# Patient Record
Sex: FEMALE | Race: BLACK OR AFRICAN AMERICAN | NOT HISPANIC OR LATINO | Employment: UNEMPLOYED | URBAN - METROPOLITAN AREA
[De-identification: names, ages, dates, MRNs, and addresses within clinical notes are randomized per-mention and may not be internally consistent; named-entity substitution may affect disease eponyms.]

---

## 2017-05-16 ENCOUNTER — HOSPITAL ENCOUNTER (EMERGENCY)
Facility: CLINIC | Age: 22
Discharge: HOME | End: 2017-05-16
Attending: EMERGENCY MEDICINE | Admitting: EMERGENCY MEDICINE
Payer: COMMERCIAL

## 2017-05-16 VITALS
HEART RATE: 86 BPM | OXYGEN SATURATION: 99 % | SYSTOLIC BLOOD PRESSURE: 145 MMHG | RESPIRATION RATE: 12 BRPM | DIASTOLIC BLOOD PRESSURE: 87 MMHG | TEMPERATURE: 99 F

## 2017-05-16 DIAGNOSIS — N92.6 MISSED PERIOD: ICD-10-CM

## 2017-05-16 LAB
ALBUMIN UR-MCNC: NEGATIVE MG/DL
APPEARANCE UR: ABNORMAL
BILIRUB UR QL STRIP: NEGATIVE
COLOR UR AUTO: YELLOW
GLUCOSE UR STRIP-MCNC: NEGATIVE MG/DL
HCG UR QL: NEGATIVE
HGB UR QL STRIP: NEGATIVE
HYALINE CASTS #/AREA URNS LPF: 1 /LPF (ref 0–2)
KETONES UR STRIP-MCNC: NEGATIVE MG/DL
LEUKOCYTE ESTERASE UR QL STRIP: ABNORMAL
MUCOUS THREADS #/AREA URNS LPF: PRESENT /LPF
NITRATE UR QL: NEGATIVE
PH UR STRIP: 6 PH (ref 5–7)
RBC #/AREA URNS AUTO: 2 /HPF (ref 0–2)
SP GR UR STRIP: 1.03 (ref 1–1.03)
SQUAMOUS #/AREA URNS AUTO: 20 /HPF (ref 0–1)
URN SPEC COLLECT METH UR: ABNORMAL
UROBILINOGEN UR STRIP-MCNC: 0 MG/DL (ref 0–2)
WBC #/AREA URNS AUTO: 8 /HPF (ref 0–2)

## 2017-05-16 PROCEDURE — 81001 URINALYSIS AUTO W/SCOPE: CPT | Performed by: EMERGENCY MEDICINE

## 2017-05-16 PROCEDURE — 99283 EMERGENCY DEPT VISIT LOW MDM: CPT

## 2017-05-16 PROCEDURE — 87086 URINE CULTURE/COLONY COUNT: CPT | Performed by: EMERGENCY MEDICINE

## 2017-05-16 PROCEDURE — 81025 URINE PREGNANCY TEST: CPT | Performed by: EMERGENCY MEDICINE

## 2017-05-16 ASSESSMENT — ENCOUNTER SYMPTOMS
FREQUENCY: 0
FEVER: 0
CHILLS: 0
DYSURIA: 0
ABDOMINAL PAIN: 0
FLANK PAIN: 0

## 2017-05-16 NOTE — DISCHARGE INSTRUCTIONS
Amenorrhea     Normally, the uterine lining builds up and is shed each month during a woman s period. With amenorrhea, this process does not happen.   Menstruation occurs when a woman sheds the lining of her uterus (womb). It is also called a  period.  For most women, this happens once a month. But some women may not get their periods. This is called amenorrhea.  Amenorrhea may be due to a number of causes. These include:    Pregnancy, breastfeeding, or menopause    Hormone problems    Emotional stress    Being at too low body weight    Exercising too much    Poor nutrition or eating disorders    Taking certain medicines    Having certain birth defects or genetic disorders  There are 2 types of amenorrhea:    Primary amenorrhea is when a girl has not had her first period by age 15.    Secondary amenorrhea is when:    A girl or woman who has been having normal periods stops getting them for 3 months in a row    A girl or woman who has been having irregular periods stops getting them for 6 months in a row  One or more tests can be done to find out why you re not having periods. These include blood tests and imaging tests. Once the cause is found, it can be treated. Treatments can range from lifestyle and diet changes to medicines, procedures, or surgery. Your healthcare provider will discuss options with you as needed.  Follow-up care  Follow up with your healthcare provider as advised. You'll be told the results of any tests as soon as they are ready.   Note: Know that you can still become pregnant even if you are not having regular periods. It is important to use some form of birth control if you are sexually active and do not wish to become pregnant.   When to seek medical advice  Call your healthcare provider right way if any of these occur:    Severe pain in the abdomen (belly)    Swelling of the belly    Sudden, severe vaginal bleeding    Feeling faint or passing out    0638-2830 [employer] [address]  [place] [address]. All rights reserved. This information is not intended as a substitute for professional medical care. Always follow your healthcare professional's instructions.

## 2017-05-16 NOTE — ED AVS SNAPSHOT
[facility] Emergency Department    [address]    [facility] [address]    Phone:  [phone]    Fax:  [phone]                                       Anna Qiu   MRN: [id number]    Department:  [facility] Emergency Department   Date of Visit:  5/16/2017           After Visit Summary Signature Page     I have received my discharge instructions, and my questions have been answered. I have discussed any challenges I see with this plan with the nurse or doctor.    ..........................................................................................................................................  Patient/Patient Representative Signature      ..........................................................................................................................................  Patient Representative Print Name and Relationship to Patient    ..................................................               ................................................  Date                                            Time    ..........................................................................................................................................  Reviewed by Signature/Title    ...................................................              ..............................................  Date                                                            Time

## 2017-05-16 NOTE — ED PROVIDER NOTES
History     Chief Complaint:  Pregnancy Test      The history is provided by the patient.      Anna Qiu is a 21 year old female who presents with concerns for pregnancy. Her last known period was 4/10 and she estimates she is five days late. She is sexually active and not taking birth control. She denies any issues with fever or urinary symptoms.    Allergies:  No Known Drug Allergies      Medications:    The patient is not currently taking any prescribed medications.     Past Medical History:    The patient denies any relevant past medical history.     Past Surgical History:    History reviewed. No pertinent past surgical history.     Family History:    The patient denies any relevant family medical history.     Social History:  The patient presented to the ED alone.   Smoking Status: Never smoker  Smokeless Tobacco: No  Alcohol Use: No   Marital Status:  Single [1]     Review of Systems   Constitutional: Negative for chills and fever.   Gastrointestinal: Negative for abdominal pain.   Genitourinary: Negative for dysuria, flank pain, frequency, vaginal bleeding and vaginal discharge.   All other systems reviewed and are negative.    Physical Exam   Vitals:  Patient Vitals for the past 24 hrs:   BP Temp Pulse Heart Rate Resp SpO2   05/16/17 1241 145/87 99  F (37.2  C) 86 86 12 99 %     Physical Exam  Constitutional: Patient appears well-developed and well-nourished. Patient is in no acute distress  HENT:    Head: No external signs of trauma noted.   Eyes: Conjunctivae are normal. Pupils are equal, round, and reactive to light.   Cardiovascular:    Normal rate, regular rhythm and normal heart sounds.     Exam reveals no friction rub.     No murmur heard.  Pulmonary/Chest:    Effort normal and breath sounds normal.    No respiratory distress.    There are no wheezes.    There are no rales.   Abdominal:    Soft.    Bowel sounds normal.    There is no distension.    There is no tenderness.    There is no  rebound or guarding.   Musculoskeletal:    Normal range of motion.    Normal Tone  Neurological: Patient is alert and oriented to person, place, and time.   Skin: Skin is warm and dry. Patient is not diaphoretic.    Emergency Department Course     Laboratory:  Laboratory findings were communicated with the patient who voiced understanding of the findings.  UA: Leukocyte Esterase: Trace (A), WBC/HPF: 8 (H), Squamous Epithelial: 20 (H), Mucous: Present (A) o/w WNL  HCG Qualitative: Negative    Emergency Department Course:  Nursing notes and vitals reviewed.  I performed an exam of the patient as documented above.   The patient provided a urine sample here in the emergency department. This was sent for laboratory testing, findings above.   The patient was updated on the results of their lab tests.      I discussed the treatment plan with the patient. They expressed understanding of this plan and consented to discharge. They will be discharged home with instructions for care and follow up. In addition, the patient will return to the emergency department if their symptoms persist, worsen, if new symptoms arise or if there is any concern.  All questions were answered.    I personally reviewed the laboratory results with the Patient and answered all related questions prior to discharge.    Impression & Plan      Medical Decision Making:  Anna Qiu is a 21 year old female who presents to the emergency department today for evaluation of pregnancy status. Patient states she has been sexually active and is about five days late in her period. The patient's UA is unremarkable and she is not pregnant. She has no other symptoms. At this time we will discharge her home and follow up with PCP. Anticipatory guidance given prior to discharge.     Diagnosis:    ICD-10-CM    1. Missed period N92.6       Disposition:   Discharge    Scribe Disclosure:  Luigi VAZQUEZ, am serving as a scribe at 1:20 PM on 5/16/2017 to document  services personally performed by Abdifatah Sawyer DO, based on my observations and the provider's statements to me.   5/16/2017   [facility] EMERGENCY DEPARTMENT       Abdifatah Sawyer DO  05/16/17 4969

## 2017-05-16 NOTE — ED AVS SNAPSHOT
[facility] Emergency Department    [address]    [facility] [address]    Phone:  [phone]    Fax:  [phone]                                       Anna Qiu   MRN: [id number]    Department:  [facility] Emergency Department   Date of Visit:  5/16/2017           Patient Information     Date Of Birth          1995        Your diagnoses for this visit were:     Missed period        You were seen by Abdifatah Sawyer DO.      Follow-up Information     Follow up with [facility]. Call in 2 days.    Why:  As needed    Contact information:    [address] [facility] [address]  [phone]          Follow up with [facility] Emergency Department.    Specialty:  EMERGENCY MEDICINE    Why:  If symptoms worsen    Contact information:    [address]  [facility] [address]  [phone]        Discharge Instructions         Amenorrhea     Normally, the uterine lining builds up and is shed each month during a woman s period. With amenorrhea, this process does not happen.   Menstruation occurs when a woman sheds the lining of her uterus (womb). It is also called a  period.  For most women, this happens once a month. But some women may not get their periods. This is called amenorrhea.  Amenorrhea may be due to a number of causes. These include:    Pregnancy, breastfeeding, or menopause    Hormone problems    Emotional stress    Being at too low body weight    Exercising too much    Poor nutrition or eating disorders    Taking certain medicines    Having certain birth defects or genetic disorders  There are 2 types of amenorrhea:    Primary amenorrhea is when a girl has not had her first period by age 15.    Secondary amenorrhea is when:    A girl or woman who has been having normal periods stops getting them for 3 months in a row    A girl or woman who has been having irregular periods stops getting them  for 6 months in a row  One or more tests can be done to find out why you re not having periods. These include blood tests and imaging tests. Once the cause is found, it can be treated. Treatments can range from lifestyle and diet changes to medicines, procedures, or surgery. Your healthcare provider will discuss options with you as needed.  Follow-up care  Follow up with your healthcare provider as advised. You'll be told the results of any tests as soon as they are ready.   Note: Know that you can still become pregnant even if you are not having regular periods. It is important to use some form of birth control if you are sexually active and do not wish to become pregnant.   When to seek medical advice  Call your healthcare provider right way if any of these occur:    Severe pain in the abdomen (belly)    Swelling of the belly    Sudden, severe vaginal bleeding    Feeling faint or passing out    7691-5666 [employer]. [address] [place] [address]. All rights reserved. This information is not intended as a substitute for professional medical care. Always follow your healthcare professional's instructions.          24 Hour Appointment Hotline       To make an appointment at any [facility], call [phone] ([phone]). If you don't have a family doctor or clinic, we will help you find one. [facility] are conveniently located to serve the needs of you and your family.             Review of your medicines      Our records show that you are taking the medicines listed below. If these are incorrect, please call your family doctor or clinic.        Dose / Directions Last dose taken    acetaminophen-codeine 300-30 MG per tablet   Commonly known as:  TYLENOL/codeine #3   Dose:  1-2 tablet   Quantity:  20 tablet        Take 1-2 tablets by mouth every 6 hours as needed for pain.   Refills:  0        ibuprofen 200 MG tablet   Commonly known as:  ADVIL/MOTRIN   Dose:  600 mg    Quantity:  1 tablet        Take 3 tablets by mouth every 8 hours as needed for pain.   Refills:  0                Procedures and tests performed during your visit     HCG qualitative urine    UA with Microscopic    Urine Culture      Orders Needing Specimen Collection     None      Pending Results     Date and Time Order Name Status Description    5/16/2017 1239 Urine Culture In process             Pending Culture Results     Date and Time Order Name Status Description    5/16/2017 1239 Urine Culture In process             Pending Results Instructions     If you had any lab results that were not finalized at the time of your Discharge, you can call the ED Lab Result RN at [phone]. You will be contacted by this team for any positive Lab results or changes in treatment. The nurses are available 7 days a week from 10A to 6:30P.  You can leave a message 24 hours per day and they will return your call.        Test Results From Your Hospital Stay        5/16/2017  1:25 PM      Component Results     Component Value Ref Range & Units Status    Color Urine Yellow  Final    Appearance Urine Slightly Cloudy  Final    Glucose Urine Negative NEG mg/dL Final    Bilirubin Urine Negative NEG Final    Ketones Urine Negative NEG mg/dL Final    Specific Gravity Urine 1.029 1.003 - 1.035 Final    Blood Urine Negative NEG Final    pH Urine 6.0 5.0 - 7.0 pH Final    Protein Albumin Urine Negative NEG mg/dL Final    Urobilinogen mg/dL 0.0 0.0 - 2.0 mg/dL Final    Nitrite Urine Negative NEG Final    Leukocyte Esterase Urine Trace (A) NEG Final    Source Midstream Urine  Final    WBC Urine 8 (H) 0 - 2 /HPF Final    RBC Urine 2 0 - 2 /HPF Final    Squamous Epithelial /HPF Urine 20 (H) 0 - 1 /HPF Final    Mucous Urine Present (A) NEG /LPF Final    Hyaline Casts 1 0 - 2 /LPF Final         5/16/2017  1:12 PM         5/16/2017  1:24 PM      Component Results     Component Value Ref Range & Units Status    HCG Qual Urine Negative NEG Final                 Clinical Quality Measure: Blood Pressure Screening     Your blood pressure was checked while you were in the emergency department today. The last reading we obtained was  BP: 145/87 . Please read the guidelines below about what these numbers mean and what you should do about them.  If your systolic blood pressure (the top number) is less than 120 and your diastolic blood pressure (the bottom number) is less than 80, then your blood pressure is normal. There is nothing more that you need to do about it.  If your systolic blood pressure (the top number) is 120-139 or your diastolic blood pressure (the bottom number) is 80-89, your blood pressure may be higher than it should be. You should have your blood pressure rechecked within a year by a primary care provider.  If your systolic blood pressure (the top number) is 140 or greater or your diastolic blood pressure (the bottom number) is 90 or greater, you may have high blood pressure. High blood pressure is treatable, but if left untreated over time it can put you at risk for heart attack, stroke, or kidney failure. You should have your blood pressure rechecked by a primary care provider within the next 4 weeks.  If your provider in the emergency department today gave you specific instructions to follow-up with your doctor or provider even sooner than that, you should follow that instruction and not wait for up to 4 weeks for your follow-up visit.        Thank you for choosing [facility]       Thank you for choosing [facility] for your care. Our goal is always to provide you with excellent care. Hearing back from our patients is one way we can continue to improve our services. Please take a few minutes to complete the written survey that you may receive in the mail after you visit with us. Thank you!        [facility] Information     [facility] lets you send messages to your doctor, view your test results, renew your prescriptions, schedule appointments and more.  "To sign up, go to [email or web address] . Click on \"Log in\" on the left side of the screen, which will take you to the Welcome page. Then click on \"Sign up Now\" on the right side of the page.     You will be asked to enter the access code listed below, as well as some personal information. Please follow the directions to create your username and password.     Your access code is: [email or web address]  Expires: 2017  1:44 PM     Your access code will  in 90 days. If you need help or a new code, please call your [facility] clinic or [phone].        Care EveryWhere ID     This is your Care EveryWhere ID. This could be used by other organizations to access your [facility] medical records  [id number]        After Visit Summary       This is your record. Keep this with you and show to your community pharmacist(s) and doctor(s) at your next visit.                  "

## 2017-05-17 LAB
BACTERIA SPEC CULT: NORMAL
Lab: NORMAL
MICRO REPORT STATUS: NORMAL
SPECIMEN SOURCE: NORMAL

## 2017-10-19 ENCOUNTER — HOSPITAL ENCOUNTER (EMERGENCY)
Facility: CLINIC | Age: 22
Discharge: HOME | End: 2017-10-19
Attending: PHYSICIAN ASSISTANT | Admitting: PHYSICIAN ASSISTANT
Payer: COMMERCIAL

## 2017-10-19 VITALS
OXYGEN SATURATION: 100 % | DIASTOLIC BLOOD PRESSURE: 75 MMHG | HEART RATE: 82 BPM | RESPIRATION RATE: 16 BRPM | SYSTOLIC BLOOD PRESSURE: 132 MMHG | TEMPERATURE: 98.8 F

## 2017-10-19 DIAGNOSIS — R07.89 ATYPICAL CHEST PAIN: ICD-10-CM

## 2017-10-19 DIAGNOSIS — R07.0 THROAT PAIN: ICD-10-CM

## 2017-10-19 LAB
DEPRECATED S PYO AG THROAT QL EIA: NORMAL
SPECIMEN SOURCE: NORMAL

## 2017-10-19 PROCEDURE — 93005 ELECTROCARDIOGRAM TRACING: CPT

## 2017-10-19 PROCEDURE — 99284 EMERGENCY DEPT VISIT MOD MDM: CPT

## 2017-10-19 PROCEDURE — 87880 STREP A ASSAY W/OPTIC: CPT | Performed by: PHYSICIAN ASSISTANT

## 2017-10-19 PROCEDURE — 87081 CULTURE SCREEN ONLY: CPT | Performed by: PHYSICIAN ASSISTANT

## 2017-10-19 PROCEDURE — 25000132 ZZH RX MED GY IP 250 OP 250 PS 637: Performed by: PHYSICIAN ASSISTANT

## 2017-10-19 RX ORDER — IBUPROFEN 600 MG/1
600 TABLET, FILM COATED ORAL ONCE
Status: COMPLETED | OUTPATIENT
Start: 2017-10-19 | End: 2017-10-19

## 2017-10-19 RX ADMIN — IBUPROFEN 600 MG: 600 TABLET ORAL at 11:08

## 2017-10-19 RX ADMIN — Medication 10 MG: at 11:08

## 2017-10-19 ASSESSMENT — ENCOUNTER SYMPTOMS
SORE THROAT: 1
HEADACHES: 1
VOMITING: 0
APPETITE CHANGE: 1
NAUSEA: 1
FEVER: 0

## 2017-10-19 NOTE — ED NOTES
Swabbing patients throat when she began to complain of chest heaviness.  Patients states she has had heart problems 6-7 months ago but did not follow up. Respiratory rate is 16, effort non-labored. Informed PA of patient complaints.

## 2017-10-19 NOTE — ED AVS SNAPSHOT
[facility] Emergency Department    [address]    [facility] [address]    Phone:  [phone]    Fax:  [phone]                                       Anna Qiu   MRN: [id number]    Department:  [facility] Emergency Department   Date of Visit:  10/19/2017           Patient Information     Date Of Birth          1995        Your diagnoses for this visit were:     Throat pain     Atypical chest pain        You were seen by Trish Pillai PA-C.      Follow-up Information     Follow up with Jerrica Craig NP In 3 days.    Specialty:  Nurse Practitioner    Why:  As needed    Contact information:    [facility]  [address] [facility] [address] [phone]          Follow up with [facility] Emergency Department.    Specialty:  EMERGENCY MEDICINE    Why:  If symptoms worsen    Contact information:    [address]  [facility] [id number] [phone]        Discharge Instructions         Chest Wall Pain: Costochondritis    The chest pain that you have had today is caused by costochondritis. This condition is caused by an inflammation of the cartilage joining your ribs to your breastbone. It is not caused by heart or lung problems. Your healthcare team has made sure that the chest pain you feel is not from a life threatening cause of chest pain such as heart attack, collapsed lung, blood clot in the lung, tear in the aorta, or esophageal rupture. The inflammation may have been brought on by a blow to the chest, lifting heavy objects, intense exercise, or an illness that made you cough and sneeze a lot. It often occurs during times of emotional stress. It can be painful, but it is not dangerous. It usually goes away in 1 to 2 weeks. But it may happen again. Rarely, a more serious condition may cause symptoms similar to costochondritis. That s why it s important to watch for the warning  signs listed below.  Home care  Follow these guidelines when caring for yourself at home:    If you feel that emotional stress is a cause of your condition, try to figure out the sources of that stress. It may not be obvious. Learn ways to deal with the stress in your life. This can include regular exercise, muscle relaxation, meditation, or simply taking time out for yourself.    You may use acetaminophen, ibuprofen, or naproxen to control pain, unless another pain medicine was prescribed. If you have liver or kidney disease or ever had a stomach ulcer, talk with your healthcare provider before using these medicines.    You can also help ease pain by using a hot, wet compress or heating pad. Use this with or without a medicated skin cream that helps relieves pain.    Do stretching exercise as advised by your provider.    Take any prescribed medicines as directed.  Follow-up care  Follow up with your healthcare provider, or as advised, if you do not start to get better in the next 2 days.  When to seek medical advice  Call your healthcare provider right away if any of these occur:    A change in the type of pain. Call if it feels different, becomes more serious, lasts longer, or spreads into your shoulder, arm, neck, jaw, or back.    Shortness of breath or pain gets worse when you breathe    Weakness, dizziness, or fainting    Cough with dark-colored sputum (phlegm) or blood    Abdominal pain    Dark red or black stools    Fever of 100.4 F (38 C) or higher, or as directed by your healthcare provider  Date Last Reviewed: 12/1/2016 2000-2017 [employer]. [address] [place] [address], [place] [address]. All rights reserved. This information is not intended as a substitute for professional medical care. Always follow your healthcare professional's instructions.          Discharge References/Attachments     SORE THROAT, WHEN YOU HAVE A (ENGLISH)      24 Hour Appointment Hotline       To make an appointment at  any [facility] clinic, call [phone] ([phone]). If you don't have a family doctor or clinic, we will help you find one. [facility] are conveniently located to serve the needs of you and your family.             Review of your medicines      Our records show that you are taking the medicines listed below. If these are incorrect, please call your family doctor or clinic.        Dose / Directions Last dose taken    acetaminophen-codeine 300-30 MG per tablet   Commonly known as:  TYLENOL WITH CODEINE #3   Dose:  1-2 tablet   Quantity:  20 tablet        Take 1-2 tablets by mouth every 6 hours as needed for pain.   Refills:  0        ibuprofen 200 MG tablet   Commonly known as:  ADVIL/MOTRIN   Dose:  600 mg   Quantity:  1 tablet        Take 3 tablets by mouth every 8 hours as needed for pain.   Refills:  0                Procedures and tests performed during your visit     Beta strep group A culture    EKG 12 lead    Rapid strep screen      Orders Needing Specimen Collection     None      Pending Results     Date and Time Order Name Status Description    10/19/2017 1118 Beta strep group A culture In process             Pending Culture Results     Date and Time Order Name Status Description    10/19/2017 1118 Beta strep group A culture In process             Pending Results Instructions     If you had any lab results that were not finalized at the time of your Discharge, you can call the ED Lab Result RN at [phone]. You will be contacted by this team for any positive Lab results or changes in treatment. The nurses are available 7 days a week from 10A to 6:30P.  You can leave a message 24 hours per day and they will return your call.        Test Results From Your Hospital Stay        10/19/2017 11:48 AM      Component Results     Component    Specimen Description    Throat    Rapid Strep A Screen    NEGATIVE: No Group A streptococcal antigen detected by immunoassay, await culture report.          10/19/2017 11:48 AM                Clinical Quality Measure: Blood Pressure Screening     Your blood pressure was checked while you were in the emergency department today. The last reading we obtained was  BP: 132/75 . Please read the guidelines below about what these numbers mean and what you should do about them.  If your systolic blood pressure (the top number) is less than 120 and your diastolic blood pressure (the bottom number) is less than 80, then your blood pressure is normal. There is nothing more that you need to do about it.  If your systolic blood pressure (the top number) is 120-139 or your diastolic blood pressure (the bottom number) is 80-89, your blood pressure may be higher than it should be. You should have your blood pressure rechecked within a year by a primary care provider.  If your systolic blood pressure (the top number) is 140 or greater or your diastolic blood pressure (the bottom number) is 90 or greater, you may have high blood pressure. High blood pressure is treatable, but if left untreated over time it can put you at risk for heart attack, stroke, or kidney failure. You should have your blood pressure rechecked by a primary care provider within the next 4 weeks.  If your provider in the emergency department today gave you specific instructions to follow-up with your doctor or provider even sooner than that, you should follow that instruction and not wait for up to 4 weeks for your follow-up visit.        Thank you for choosing [facility]       Thank you for choosing [facility] for your care. Our goal is always to provide you with excellent care. Hearing back from our patients is one way we can continue to improve our services. Please take a few minutes to complete the written survey that you may receive in the mail after you visit with us. Thank you!        [employer] [employer] Information     [employer] lets you send messages to your doctor, view your test results, renew your prescriptions, schedule  "appointments and more. To sign up, go to [email or web address] . Click on \"Log in\" on the left side of the screen, which will take you to the Welcome page. Then click on \"Sign up Now\" on the right side of the page.     You will be asked to enter the access code listed below, as well as some personal information. Please follow the directions to create your username and password.     Your access code is: [email or web address]  Expires: 2018 12:24 PM     Your access code will  in 90 days. If you need help or a new code, please call your [facility] clinic or [phone].        Care EveryWhere ID     This is your Care EveryWhere ID. This could be used by other organizations to access your [facility] medical records  [id number]        Equal Access to Services     PARKER ROCHA : Hadsuhas Morse, benji lopez, jaclyn garcia, rodolfo browne . So [facility] [phone].    ATENCIÓN: Si habla español, tiene a perez disposición servicios gratuitos de asistencia lingüística. Llame al [phone].    We comply with applicable federal civil rights laws and [place] laws. We do not discriminate on the basis of race, color, national origin, age, disability, sex, sexual orientation, or gender identity.            After Visit Summary       This is your record. Keep this with you and show to your community pharmacist(s) and doctor(s) at your next visit.                  "

## 2017-10-19 NOTE — ED NOTES
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 Patient states that she has had a sore throat with a headache for 2 days. Denies shortness of breath.

## 2017-10-19 NOTE — LETTER
To Whom it may concern:      Anna Qiu was seen in our Emergency Department today, 10/19/17. Please excuse her from work today.     Sincerely,          Brook Pillai PA-C

## 2017-10-19 NOTE — DISCHARGE INSTRUCTIONS
Chest Wall Pain: Costochondritis    The chest pain that you have had today is caused by costochondritis. This condition is caused by an inflammation of the cartilage joining your ribs to your breastbone. It is not caused by heart or lung problems. Your healthcare team has made sure that the chest pain you feel is not from a life threatening cause of chest pain such as heart attack, collapsed lung, blood clot in the lung, tear in the aorta, or esophageal rupture. The inflammation may have been brought on by a blow to the chest, lifting heavy objects, intense exercise, or an illness that made you cough and sneeze a lot. It often occurs during times of emotional stress. It can be painful, but it is not dangerous. It usually goes away in 1 to 2 weeks. But it may happen again. Rarely, a more serious condition may cause symptoms similar to costochondritis. That s why it s important to watch for the warning signs listed below.  Home care  Follow these guidelines when caring for yourself at home:    If you feel that emotional stress is a cause of your condition, try to figure out the sources of that stress. It may not be obvious. Learn ways to deal with the stress in your life. This can include regular exercise, muscle relaxation, meditation, or simply taking time out for yourself.    You may use acetaminophen, ibuprofen, or naproxen to control pain, unless another pain medicine was prescribed. If you have liver or kidney disease or ever had a stomach ulcer, talk with your healthcare provider before using these medicines.    You can also help ease pain by using a hot, wet compress or heating pad. Use this with or without a medicated skin cream that helps relieves pain.    Do stretching exercise as advised by your provider.    Take any prescribed medicines as directed.  Follow-up care  Follow up with your healthcare provider, or as advised, if you do not start to get better in the next 2 days.  When to seek medical  advice  Call your healthcare provider right away if any of these occur:    A change in the type of pain. Call if it feels different, becomes more serious, lasts longer, or spreads into your shoulder, arm, neck, jaw, or back.    Shortness of breath or pain gets worse when you breathe    Weakness, dizziness, or fainting    Cough with dark-colored sputum (phlegm) or blood    Abdominal pain    Dark red or black stools    Fever of 100.4 F (38 C) or higher, or as directed by your healthcare provider  Date Last Reviewed: 12/1/2016 2000-2017 [employer]. [address] [place] [address]. All rights reserved. This information is not intended as a substitute for professional medical care. Always follow your healthcare professional's instructions.

## 2017-10-19 NOTE — ED AVS SNAPSHOT
[facility] Emergency Department    [address]    [facility] [address]    Phone:  [phone]    Fax:  [phone]                                       Anna Qiu   MRN: [id number]    Department:  [facility] Emergency Department   Date of Visit:  10/19/2017           After Visit Summary Signature Page     I have received my discharge instructions, and my questions have been answered. I have discussed any challenges I see with this plan with the nurse or doctor.    ..........................................................................................................................................  Patient/Patient Representative Signature      ..........................................................................................................................................  Patient Representative Print Name and Relationship to Patient    ..................................................               ................................................  Date                                            Time    ..........................................................................................................................................  Reviewed by Signature/Title    ...................................................              ..............................................  Date                                                            Time

## 2017-10-19 NOTE — ED PROVIDER NOTES
"  History     Chief Complaint:  Throat Pain    HPI   Anna Qiu is a 21 year old female who presents to the emergency department today for evaluation of throat pain. The patient reports two days ago she developed a sore throat which she describes as dry and swollen. She also notes associated mild cough and migraine since the onset as well as a decrease in appetite but trying to drink adequate amounts of water. She went to work this morning with persistent symptoms and felt nauseous. 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 She denies fevers, ear pain, and vomiting.     Additionally, the patient repots some chest heaviness. Per care everywhere, the patient was seen and evaluated at an  Urgent Care in [place] 10 days ago for evaluation of chest pain that has been ongoing for six months. At this visit she had a normal chest x-ray and ultimately discharged to home.  She did not follow up since this visit. However, the patient was evaluated by cardiology and had a holter monitor and an echocardiogram in March of this year which was essentially normal except for some trace valvular regurgitation of the mitral and pulmonic and tricuspid valves. Ejection fraction 65 percent. Normal chamber sizes. Here the patient states she has chest heaviness daily for the past several years for 15 minutes that \"paralyzes\" her and goes away on its own.     Allergies:  No Known Drug Allergies     Medications:    The patient is currently on no regular medications.     Past Medical History:    History reviewed. No pertinent past medical history.    Past Surgical History:    History reviewed. No pertinent past surgical history.    Family History:    History reviewed. No pertinent family history.     Social History:  The patient was alone.  Smoking Status: Never  Alcohol Use: No  Marital Status:  Single      Review of Systems   Constitutional: Positive for appetite change. Negative " for fever.   HENT: Positive for sore throat. Negative for ear pain.    Cardiovascular: Positive for chest pain.   Gastrointestinal: Positive for nausea. Negative for vomiting.   Neurological: Positive for headaches.   All other systems reviewed and are negative.    Physical Exam   First Vitals:  BP: (!) 156/91  Pulse: 92  Temp: 98.8  F (37.1  C)  Resp: 18  SpO2: 100 %    Physical Exam  Constitutional: Alert, attentive  HENT:    Nose: Nose normal.    Mouth/Throat: Oropharynx is clear, mucous membranes are moist. Posterior oropharynx is erythematous  Eyes: EOM are normal. Pupils are equal, round, and reactive to light.   CV: regular rate and rhythm  Chest: Effort normal and breath sounds normal.   GI:  There is no tenderness. No distension. Normal bowel sounds  MSK: Normal range of motion.   Neurological: Alert, attentive  Skin: Skin is warm and dry.     Emergency Department Course     ECG:  Indication: Chest heaviness/pain  Completed at 1132.  Read at 1143.   Normal sinus rhythm  Normal ECG   Rate 74 bpm. MT interval 172. QRS duration 86. QT/QTc 382/424. P-R-T axes 62 64 62.    Laboratory:  Laboratory findings were communicated with the patient who voiced understanding of the findings.  Rapid strep screen: Negative     Beta Strep Group A Culture: Pending     Interventions:  1108 Ibuprofen 600mg PO  1108 Decadron 10mg PO     Emergency Department Course:  Nursing notes and vitals reviewed.  The patient's throat was swabbed and this sample was sent for rapid strep screen, findings above.   1052: I performed an exam of the patient as documented above.   1120: Patient complaining of chest heaviness. EKG ordered.   1210: Patient rechecked and updated.   Findings and plan explained to the Patient. Patient discharged home with instructions regarding supportive care, medications, and reasons to return. The importance of close follow-up was reviewed.   I personally reviewed the laboratory results with the Patient and answered  all related questions prior to discharge.    Impression & Plan      Medical Decision Making:  Anna Qiu is a 21 year old female who presents for evaluation of a sore throat and clinical evidence of pharyngitis.  The rapid strep test is negative, and formal culture has been set up in the lab. There is no clinical evidence of peritonsillar abscess, retropharyngeal abscess, Lemierre's Syndrome, epiglottis, or Jaquan's angina. The etiology is most likely viral.   I have recommended treatment with analgesics, and we will await formal culture results.  If the culture is positive, an ED physician will call the patient to initiate anti-microbial therapy. Return if increasing pain, change in voice, neck pain, vomiting, fever, or shortness of breath. Follow-up with primary physician if not improving in 3-5 days. Given well appearance, I would not test further for other etiologies of serious bacterial infections.     The patient also reports having chest pain.  The EKG is negative.  The differential diagnosis of chest pain is broad and includes life threatening etiologies such as Acute coronary syndrome, Myocardial infarction, Pulmonary Embolism, and Acute Aortic Dissection.  Other causes may include pneumonia, pneumothorax, pericarditis, pleurisy, and esophageal spasm.  No serious etiology for the chest pain was detected today during this visit.  The patient had a normal holter monitor and echo seven months ago and I believe no further work up is indicated at this time. She is young and healthy without parents or siblings having history of cardiac etiology and no personal cardiac history. I feel normal EKG is sufficient to rule out ACS in this patient. She is on estrogen birth control but denies shortness of breath and I have very low suspicion for PE given symptoms daily for several years. Close follow up with primary care is indicated should the pain continue, as further work up may be performed; this was made clear  to Anna, who understands.    Diagnosis:    ICD-10-CM    1. Throat pain R07.0    2. Atypical chest pain R07.89      Disposition:  Discharged to home    Scribe Disclosure:  I, Barbara Cosby, am serving as a scribe at 10:49 AM on 10/19/2017 to document services personally performed by Trish Pillai PA-C based on my observations and the provider's statements to me.     10/19/2017   [facility] EMERGENCY DEPARTMENT       Trish Pillai PA-C  10/19/17 1254

## 2017-10-20 LAB — INTERPRETATION ECG - MUSE: NORMAL

## 2017-10-21 ENCOUNTER — HEALTH MAINTENANCE LETTER (OUTPATIENT)
Age: 22
End: 2017-10-21

## 2017-10-21 LAB
BACTERIA SPEC CULT: NORMAL
SPECIMEN SOURCE: NORMAL

## 2018-01-07 ENCOUNTER — HOSPITAL ENCOUNTER (EMERGENCY)
Facility: CLINIC | Age: 23
Discharge: HOME | End: 2018-01-07
Attending: EMERGENCY MEDICINE | Admitting: EMERGENCY MEDICINE

## 2018-01-07 VITALS
SYSTOLIC BLOOD PRESSURE: 137 MMHG | HEART RATE: 84 BPM | WEIGHT: 220 LBS | DIASTOLIC BLOOD PRESSURE: 69 MMHG | RESPIRATION RATE: 16 BRPM | BODY MASS INDEX: 33.34 KG/M2 | OXYGEN SATURATION: 99 % | TEMPERATURE: 98.8 F | HEIGHT: 68 IN

## 2018-01-07 DIAGNOSIS — J11.1 INFLUENZA-LIKE ILLNESS: ICD-10-CM

## 2018-01-07 PROCEDURE — 99282 EMERGENCY DEPT VISIT SF MDM: CPT

## 2018-01-07 ASSESSMENT — ENCOUNTER SYMPTOMS
HEADACHES: 1
SHORTNESS OF BREATH: 1
VOICE CHANGE: 1
SORE THROAT: 1
COUGH: 1

## 2018-01-07 NOTE — ED AVS SNAPSHOT
[facility] Emergency Department    [address]    [facility] [address]    Phone:  [phone]    Fax:  [phone]                                       Anna Qiu   MRN: [id number]    Department:  [facility] Emergency Department   Date of Visit:  1/7/2018           Patient Information     Date Of Birth          1995        Your diagnoses for this visit were:     Influenza-like illness        You were seen by Julio Torre MD.      Follow-up Information     Follow up with Jerrica Craig NP.    Specialty:  Nurse Practitioner    Why:  As needed, for re-evaluation of your symptoms if not resolving by next week    Contact information:    [facility]  [address] [address] [address]  [phone]          Discharge Instructions       Discharge Instructions  Influenza    You were diagnosed today with influenza or influenza like illness.  Influenza is a respiratory (breathing) illness caused by influenza A or B viruses.  Influenza causes five primary symptoms: fever, headache, muscle aches/fatigue/malaise, sore throat and cough.  These symptoms start one to four days after you have been around a person with this illness. Influenza is spread through sneezing and coughing and can live on surfaces for several days.  It is usually contagious for 5 days but in some cases up to 10 days and often affects several family members. If you have a family member who is less than 2 years old, older than 65 years old, pregnant or has a serious medical condition, they should be seen right away by a provider to decide if they should take preventative medications. Although influenza will make you feel very ill, most patients don t require any specific treatment. An antiviral medication might be prescribed for certain groups of patients (older patients, younger patients, and those with certain chronic medical problems).    Generally, every  Emergency Department visit should have a follow-up clinic visit with either a primary or a specialty clinic/provider. Please follow-up as instructed by your emergency provider today.    Return to the Emergency Department if:    You have trouble breathing.    You develop pain in your chest.    You have signs of being dehydrated, such as dizziness or unable to urinate (pee) at least three times daily.    You are confused or severely weak.    You cannot stop vomiting (throwing up) or you cannot drink enough fluids.    In children, you should seek help if the child has any of the above or if child:    Has blue or purplish skin color.    Is so irritable that he or she does not want to be held.    Does not have tears when crying (in infants) or does not urinate at least three times daily.    Does not wake up easily.    What can I do to help myself?    Rest.    Fluids -- Drink hydrating solutions such as Gatorade  or Pedialyte  as often as you can. If you are drinking enough, you should pass urine at least every eight hours.    Tylenol  (acetaminophen) and Advil  (ibuprofen) can relieve fever, headache, and muscle aches. Do not give aspirin to children under 18 years old.     Antiviral treatment -- Antiviral medicines do not make the flu symptoms go away immediately.  They have only been shown to make the symptoms go away 12 to 24 hours sooner than they would without treatment.       Antibiotics -- Antibiotics are NOT useful for treating viral illnesses such as influenza. Antibiotics should only be used if there is a bacterial complication of the flu such as bacterial pneumonia, ear infection, or sinusitis.    Because you were diagnosed with a flu like illness you are very contagious.  This means you cannot work, attend school or  for at least 24 hours or until you no longer have a fever.  If you were given a prescription for medicine here today, be sure to read all of the information (including the package insert)  that comes with your prescription.  This will include important information about the medicine, its side effects, and any warnings that you need to know about.  The pharmacist who fills the prescription can provide more information and answer questions you may have about the medicine.  If you have questions or concerns that the pharmacist cannot address, please call or return to the Emergency Department.   Remember that you can always come back to the Emergency Department if you are not able to see your regular provider in the amount of time listed above, if you get any new symptoms, or if there is anything that worries you.      24 Hour Appointment Hotline       To make an appointment at any [facility] (formerly [facility]), call [phone] ([phone]). If you don't have a family doctor or clinic, we will help you find one. [facility] clinics are conveniently located to serve the needs of you and your family.             Review of your medicines      Our records show that you are taking the medicines listed below. If these are incorrect, please call your family doctor or clinic.        Dose / Directions Last dose taken    acetaminophen-codeine 300-30 MG per tablet   Commonly known as:  TYLENOL WITH CODEINE #3   Dose:  1-2 tablet   Quantity:  20 tablet        Take 1-2 tablets by mouth every 6 hours as needed for pain.   Refills:  0        ibuprofen 200 MG tablet   Commonly known as:  ADVIL/MOTRIN   Dose:  600 mg   Quantity:  1 tablet        Take 3 tablets by mouth every 8 hours as needed for pain.   Refills:  0                Orders Needing Specimen Collection     None      Pending Results     No orders found from 1/5/2018 to 1/8/2018.            Pending Culture Results     No orders found from 1/5/2018 to 1/8/2018.            Pending Results Instructions     If you had any lab results that were not finalized at the time of your Discharge, you can call the ED Lab Result RN at [phone]. You will be contacted by this team  for any positive Lab results or changes in treatment. The nurses are available 7 days a week from 10A to 6:30P.  You can leave a message 24 hours per day and they will return your call.        Test Results From Your Hospital Stay               Clinical Quality Measure: Blood Pressure Screening     Your blood pressure was checked while you were in the emergency department today. The last reading we obtained was  BP: 137/69 . Please read the guidelines below about what these numbers mean and what you should do about them.  If your systolic blood pressure (the top number) is less than 120 and your diastolic blood pressure (the bottom number) is less than 80, then your blood pressure is normal. There is nothing more that you need to do about it.  If your systolic blood pressure (the top number) is 120-139 or your diastolic blood pressure (the bottom number) is 80-89, your blood pressure may be higher than it should be. You should have your blood pressure rechecked within a year by a primary care provider.  If your systolic blood pressure (the top number) is 140 or greater or your diastolic blood pressure (the bottom number) is 90 or greater, you may have high blood pressure. High blood pressure is treatable, but if left untreated over time it can put you at risk for heart attack, stroke, or kidney failure. You should have your blood pressure rechecked by a primary care provider within the next 4 weeks.  If your provider in the emergency department today gave you specific instructions to follow-up with your doctor or provider even sooner than that, you should follow that instruction and not wait for up to 4 weeks for your follow-up visit.        Thank you for choosing [facility]       Thank you for choosing [facility] for your care. Our goal is always to provide you with excellent care. Hearing back from our patients is one way we can continue to improve our services. Please take a few minutes to complete the written survey  "that you may receive in the mail after you visit with us. Thank you!        [employer] Information     cookdinner lets you send messages to your doctor, view your test results, renew your prescriptions, schedule appointments and more. To sign up, go to [email or web address] . Click on \"Log in\" on the left side of the screen, which will take you to the Welcome page. Then click on \"Sign up Now\" on the right side of the page.     You will be asked to enter the access code listed below, as well as some personal information. Please follow the directions to create your username and password.     Your access code is: [email or web address]  Expires: 2018 11:24 AM     Your access code will  in 90 days. If you need help or a new code, please call your [place] clinic or [phone].        Care EveryWhere ID     This is your Care EveryWhere ID. This could be used by other organizations to access your [place] medical records  [id number]        Equal Access to Services     PARKER ROCHA : Hadsuhas galvino Soneymar, waaxda luqadaha, qaybta kaalmada jose, rodolfo allen. So [facility] [phone].    ATENCIÓN: Si habla español, tiene a perez disposición servicios gratuitos de asistencia lingüística. Llame al [phone].    We comply with applicable federal civil rights laws and [place] laws. We do not discriminate on the basis of race, color, national origin, age, disability, sex, sexual orientation, or gender identity.            After Visit Summary       This is your record. Keep this with you and show to your community pharmacist(s) and doctor(s) at your next visit.                  "

## 2018-01-07 NOTE — ED NOTES
Headache, cough and congestion for the past 3 days.  Patient alert and oriented x3.  Airway, breathing and circulation intact.

## 2018-01-07 NOTE — ED PROVIDER NOTES
"  History     Chief Complaint:  Cough    HPI   Anna Qiu is a 22 year old female who presents to the ED for evaluation of cough. The patient reports her symptoms began 3 days ago; she developed headache, nasal congestion, sore throat, and cough. The patient notes her symptoms progressively worsened including raspy voice, mild shortness of breath, and chest pressure. The patient denies any other symptoms.       Allergies:  No known drug allergies    Medications:    The patient is not currently taking any prescribed medications.    Past Medical History:    The patient does not have any past pertinent medical history.    Past Surgical History:    History reviewed. No pertinent surgical history.    Family History:    History reviewed. No pertinent family history.     Social History:  Smoking status: Never smoker  Alcohol use: No  Presents to ED alone   Marital Status:  Single [1]     Review of Systems   HENT: Positive for congestion, sore throat and voice change.    Respiratory: Positive for cough and shortness of breath.    Neurological: Positive for headaches.   All other systems reviewed and are negative.    Physical Exam     Patient Vitals for the past 24 hrs:   BP Temp Temp src Pulse Resp SpO2 Height Weight   01/07/18 1243 137/69 98.8  F (37.1  C) Oral 84 16 99 % 1.727 m (5' 8\") 99.8 kg (220 lb)     Physical Exam  General: Patient is alert and cooperative.  HENT:  Normal nose, oropharynx. Moist oral mucosa.  Eyes: EOMI. Normal conjunctiva.  Neck:  Normal range of motion and appearance.   Cardiovascular:  Normal rate, regular rhythm and normal heart sounds.   Pulmonary/Chest:  Effort normal. No wheezing or crackles.  Abdominal: Soft. No distension or tenderness.     Musculoskeletal: Normal range of motion. No edema or tenderness.   Neurological: oriented, normal strength, sensation, and coordination.   Skin: Warm and dry. No rash or bruising.   Psychiatric: Normal mood and affect. Normal behavior and " judgement.      Emergency Department Course     Emergency Department Course:  Past medical records, nursing notes, and vitals reviewed.  1300: I performed an exam of the patient and obtained history, as documented above.    Findings and plan explained to the Patient. Patient discharged home with instructions regarding supportive care, medications, and reasons to return. The importance of close follow-up was reviewed.     Impression & Plan      Medical Decision Making:  Anna Qiu is a 22 year old female who presents for evaluation of cough, fever and myalgias.  This is consistent with an influenza like illness.  The patient is out of treatment window for influenza.  Close followup of primary care physician is indicated and return to the ED for high fevers > 103 for more than 48 hours more, increasing productive cough, shortness of breath, or confusion.  There is no signs of serious bacterial infection such as bacteremia, meningitis, UTI/pyelonephritis, strep pharyngitis, etc.      Diagnosis:    ICD-10-CM   1. Influenza-like illness R69     Disposition: Patient discharged to home     Romelia Mejia  1/7/2018   [facility] EMERGENCY DEPARTMENT    I, Romelia Mejia, am serving as a scribe at 1:00 PM on 1/7/2018 to document services personally performed by Julio Torre MD based on my observations and the provider's statements to me.        Julio Torre MD  01/07/18 4331

## 2018-01-07 NOTE — ED AVS SNAPSHOT
[facility] Emergency Department    [address]    [facility] [address]    Phone:  [phone]    Fax:  [phone]                                       Anna Qiu   MRN: [id number]    Department:  [facility] Emergency Department   Date of Visit:  1/7/2018           After Visit Summary Signature Page     I have received my discharge instructions, and my questions have been answered. I have discussed any challenges I see with this plan with the nurse or doctor.    ..........................................................................................................................................  Patient/Patient Representative Signature      ..........................................................................................................................................  Patient Representative Print Name and Relationship to Patient    ..................................................               ................................................  Date                                            Time    ..........................................................................................................................................  Reviewed by Signature/Title    ...................................................              ..............................................  Date                                                            Time

## 2018-01-07 NOTE — LETTER
January 7, 2018      To Whom It May Concern:      Anna Qiu was seen in our Emergency Department today, 01/07/18.  I expect her condition to improve over the next several days.  She may return to work/school when improved.    Sincerely,        Julio Torre MD

## 2018-10-03 ENCOUNTER — NURSE TRIAGE (OUTPATIENT)
Dept: NURSING | Facility: CLINIC | Age: 23
End: 2018-10-03

## 2018-10-04 NOTE — TELEPHONE ENCOUNTER
"Patient is calling reporting that she has been feeling chest tightening that is painful for 20 minutes. She states that she has history of heart valve problems. She states she is sweaty and feels hot and states, \" its hard to talk sometimes.\"  She states that her mom is on her way.     Disposition: Call EMS. Caller verbalized understanding and has no other questions.     Reason for Disposition    [1] Chest pain lasts > 5 minutes AND [2] history of heart disease  (i.e., heart attack, bypass surgery, angina, angioplasty, CHF; not just a heart murmur)    Additional Information    Negative: Severe difficulty breathing (e.g., struggling for each breath, speaks in single words)    Negative: Difficult to awaken or acting confused (e.g., disoriented, slurred speech)    Negative: Shock suspected (e.g., cold/pale/clammy skin, too weak to stand, low BP, rapid pulse)    Protocols used: CHEST PAIN-ADULT-AH      "

## 2019-01-30 ENCOUNTER — HOSPITAL ENCOUNTER (EMERGENCY)
Facility: CLINIC | Age: 24
Discharge: HOME | End: 2019-01-30
Attending: EMERGENCY MEDICINE | Admitting: EMERGENCY MEDICINE
Payer: MEDICAID

## 2019-01-30 ENCOUNTER — APPOINTMENT (OUTPATIENT)
Dept: ULTRASOUND IMAGING | Facility: CLINIC | Age: 24
End: 2019-01-30
Payer: MEDICAID

## 2019-01-30 VITALS
HEART RATE: 75 BPM | TEMPERATURE: 97.5 F | OXYGEN SATURATION: 99 % | SYSTOLIC BLOOD PRESSURE: 127 MMHG | RESPIRATION RATE: 18 BRPM | DIASTOLIC BLOOD PRESSURE: 80 MMHG

## 2019-01-30 DIAGNOSIS — N72 CERVICITIS: ICD-10-CM

## 2019-01-30 LAB
ALBUMIN UR-MCNC: NEGATIVE MG/DL
ANION GAP SERPL CALCULATED.3IONS-SCNC: 8 MMOL/L (ref 3–14)
APPEARANCE UR: ABNORMAL
B-HCG FREE SERPL-ACNC: <5 IU/L
BACTERIA #/AREA URNS HPF: ABNORMAL /HPF
BILIRUB UR QL STRIP: NEGATIVE
BUN SERPL-MCNC: 7 MG/DL (ref 7–30)
CALCIUM SERPL-MCNC: 8.7 MG/DL (ref 8.5–10.1)
CHLORIDE SERPL-SCNC: 106 MMOL/L (ref 94–109)
CO2 SERPL-SCNC: 25 MMOL/L (ref 20–32)
COLOR UR AUTO: YELLOW
CREAT SERPL-MCNC: 0.85 MG/DL (ref 0.52–1.04)
ERYTHROCYTE [DISTWIDTH] IN BLOOD BY AUTOMATED COUNT: 13.1 % (ref 10–15)
GFR SERPL CREATININE-BSD FRML MDRD: >90 ML/MIN/{1.73_M2}
GLUCOSE SERPL-MCNC: 102 MG/DL (ref 70–99)
GLUCOSE UR STRIP-MCNC: NEGATIVE MG/DL
HCT VFR BLD AUTO: 38.2 % (ref 35–47)
HGB BLD-MCNC: 12.2 G/DL (ref 11.7–15.7)
HGB UR QL STRIP: NEGATIVE
KETONES UR STRIP-MCNC: NEGATIVE MG/DL
LEUKOCYTE ESTERASE UR QL STRIP: NEGATIVE
MCH RBC QN AUTO: 26.8 PG (ref 26.5–33)
MCHC RBC AUTO-ENTMCNC: 31.9 G/DL (ref 31.5–36.5)
MCV RBC AUTO: 84 FL (ref 78–100)
MUCOUS THREADS #/AREA URNS LPF: PRESENT /LPF
NITRATE UR QL: NEGATIVE
PH UR STRIP: 5 PH (ref 5–7)
PLATELET # BLD AUTO: 324 10E9/L (ref 150–450)
POTASSIUM SERPL-SCNC: 3.4 MMOL/L (ref 3.4–5.3)
RBC # BLD AUTO: 4.56 10E12/L (ref 3.8–5.2)
RBC #/AREA URNS AUTO: 1 /HPF (ref 0–2)
SODIUM SERPL-SCNC: 139 MMOL/L (ref 133–144)
SOURCE: ABNORMAL
SP GR UR STRIP: 1.03 (ref 1–1.03)
SPECIMEN SOURCE: NORMAL
SQUAMOUS #/AREA URNS AUTO: 6 /HPF (ref 0–1)
UROBILINOGEN UR STRIP-MCNC: 0 MG/DL (ref 0–2)
WBC # BLD AUTO: 6.2 10E9/L (ref 4–11)
WBC #/AREA URNS AUTO: 4 /HPF (ref 0–5)
WET PREP SPEC: NORMAL

## 2019-01-30 PROCEDURE — 93976 VASCULAR STUDY: CPT

## 2019-01-30 PROCEDURE — 99284 EMERGENCY DEPT VISIT MOD MDM: CPT | Mod: 25

## 2019-01-30 PROCEDURE — 87210 SMEAR WET MOUNT SALINE/INK: CPT | Performed by: EMERGENCY MEDICINE

## 2019-01-30 PROCEDURE — 86780 TREPONEMA PALLIDUM: CPT | Performed by: EMERGENCY MEDICINE

## 2019-01-30 PROCEDURE — 85027 COMPLETE CBC AUTOMATED: CPT | Performed by: EMERGENCY MEDICINE

## 2019-01-30 PROCEDURE — 25000125 ZZHC RX 250: Performed by: EMERGENCY MEDICINE

## 2019-01-30 PROCEDURE — 96374 THER/PROPH/DIAG INJ IV PUSH: CPT

## 2019-01-30 PROCEDURE — 96375 TX/PRO/DX INJ NEW DRUG ADDON: CPT

## 2019-01-30 PROCEDURE — 81001 URINALYSIS AUTO W/SCOPE: CPT | Performed by: EMERGENCY MEDICINE

## 2019-01-30 PROCEDURE — 25000128 H RX IP 250 OP 636: Performed by: EMERGENCY MEDICINE

## 2019-01-30 PROCEDURE — 87591 N.GONORRHOEAE DNA AMP PROB: CPT | Performed by: EMERGENCY MEDICINE

## 2019-01-30 PROCEDURE — 80048 BASIC METABOLIC PNL TOTAL CA: CPT | Performed by: EMERGENCY MEDICINE

## 2019-01-30 PROCEDURE — 87491 CHLMYD TRACH DNA AMP PROBE: CPT | Performed by: EMERGENCY MEDICINE

## 2019-01-30 PROCEDURE — 84702 CHORIONIC GONADOTROPIN TEST: CPT

## 2019-01-30 RX ORDER — HYDROCODONE BITARTRATE AND ACETAMINOPHEN 5; 325 MG/1; MG/1
1 TABLET ORAL EVERY 6 HOURS PRN
Qty: 8 TABLET | Refills: 0 | Status: SHIPPED | OUTPATIENT
Start: 2019-01-30 | End: 2019-03-06

## 2019-01-30 RX ORDER — DOXYCYCLINE 100 MG/1
100 CAPSULE ORAL 2 TIMES DAILY
Qty: 28 CAPSULE | Refills: 0 | Status: SHIPPED | OUTPATIENT
Start: 2019-01-30 | End: 2019-03-06

## 2019-01-30 RX ORDER — ONDANSETRON 2 MG/ML
4 INJECTION INTRAMUSCULAR; INTRAVENOUS ONCE
Status: COMPLETED | OUTPATIENT
Start: 2019-01-30 | End: 2019-01-30

## 2019-01-30 RX ORDER — KETOROLAC TROMETHAMINE 15 MG/ML
15 INJECTION, SOLUTION INTRAMUSCULAR; INTRAVENOUS ONCE
Status: COMPLETED | OUTPATIENT
Start: 2019-01-30 | End: 2019-01-30

## 2019-01-30 RX ADMIN — KETOROLAC TROMETHAMINE 15 MG: 15 INJECTION, SOLUTION INTRAMUSCULAR; INTRAVENOUS at 17:57

## 2019-01-30 RX ADMIN — ONDANSETRON 4 MG: 2 INJECTION INTRAMUSCULAR; INTRAVENOUS at 17:56

## 2019-01-30 RX ADMIN — LIDOCAINE HYDROCHLORIDE 250 MG: 10 INJECTION, SOLUTION EPIDURAL; INFILTRATION; INTRACAUDAL; PERINEURAL at 20:10

## 2019-01-30 ASSESSMENT — ENCOUNTER SYMPTOMS
VOMITING: 1
DYSURIA: 0
DIARRHEA: 1
CONSTIPATION: 1
NAUSEA: 1
ABDOMINAL PAIN: 1
FREQUENCY: 0
BACK PAIN: 1

## 2019-01-30 NOTE — ED TRIAGE NOTES
Pt presents with 2 days of abd pain. Low generalized abd pain, +N/V. Unsure if pregnant, last menstrual cycle 1/12. ABCs intact.

## 2019-01-30 NOTE — ED PROVIDER NOTES
History     Chief Complaint:  Abdominal Pain      HPI   Anna Qiu is a 23 year old female who presents for evaluation of lower abdominal pain for the past 2 days. The patient reports that her abdominal pain feels like intermittent cramping and pressure that lasts for over 30 minutes. The abdominal pain is more painful when it comes back and occurs approximately every 30 minutes. Her abdominal pain also shoots to her back. The pain increases with movement and she experiences some relief if she curls up and does not move. She mentions that she has been unable to eat or drink due to nausea. Patient states that she threw up once today. The patient also reports intermittent constipation and diarrhea which is not normal for her. No vaginal bleeding, vaginal discharge, dysuria, frequency, urgency. Her last period as January 12.     The patient also mentions that her partner has been experiencing abdominal pain for the past 3 weeks and might have contracted syphilis. She would like to be tested for syphilis today because she has had unprotected intercourse with him. No other sexual partners.      Allergies:  No known drug allergies    Medications:    acetaminophen-codeine (TYLENOL/CODEINE #3) 300-30 MG per tablet  ibuprofen (ADVIL,MOTRIN) 200 MG tablet    Past Medical History:    The patient does not have any past pertinent medical history.    Past Surgical History:    History reviewed. No pertinent surgical history.    Family History:    Hypertension   Diabetes    Social History:  Smoking status: Never smoker  Alcohol use: No  Marital Status:  Single [1]       Review of Systems   Gastrointestinal: Positive for abdominal pain, constipation, diarrhea, nausea and vomiting.   Genitourinary: Negative for dysuria, frequency, urgency, vaginal bleeding and vaginal discharge.   Musculoskeletal: Positive for back pain.   All other systems reviewed and are negative.      Physical Exam     Patient Vitals for the past 24  hrs:   BP Temp Temp src Pulse Heart Rate Resp SpO2   01/30/19 2016 127/80 -- -- 75 -- 18 99 %   01/30/19 2000 127/80 -- -- -- -- -- --   01/30/19 1945 -- -- -- -- -- -- 99 %   01/30/19 1915 119/74 -- -- -- -- -- 99 %   01/30/19 1709 151/88 97.5  F (36.4  C) Oral -- 88 18 100 %         Physical Exam    Constitutional:  Pleasant, age appropriate.       Resting comfortably in the bed.  HEENT:    Oropharynx is moist  Eyes:    Conjunctiva normal  Neck:    Supple, no meningismus.     CV:     Regular rate and rhythm.      No murmurs, rubs or gallops.     No lower extremity edema.  PULM:    Clear to auscultation bilateral.       No respiratory distress.      Good air exchange.  ABD:    Soft, non-distended.       Mild tenderness in the midline low abdomen.     Bowel sounds normal.     No rebound, guarding or rigidity.     No CVA tenderness.   :    Normal external genitalia.     No perineal lesions.     No blood in the in the vaginal vault.     Moderate amount of white-yellow vaginal discharge.     + cervical motion tenderness.     No adnexal tenderness.     No adnexal mass.  MSK:     No gross deformity to all four extremities.   LYMPH:   No cervical lymphadenopathy.  NEURO:   Alert.  Good muscular tone, no atrophy.   Skin:    Warm, dry and intact.    Psych:    Mood is good and affect is appropriate.        Emergency Department Course     Imaging:  Radiographic findings were communicated with the patient who voiced understanding of the findings.    US pelvic complete w transvaginal and abdomen/pelvis duplex limited  IMPRESSION:   1. Trace free fluid in the pelvis is nonspecific, and may be  physiologic.  2. Otherwise unremarkable pelvic ultrasound. No convincing sonographic  evidence for ovarian torsion.  As read by radiology     Laboratory:  Neisseria Gonorrheae PCR: In process  Chlamydia trachomatis PCR: In process  Treponema Abs w Reflex to RPR and Titer: In process  Wet prep: Negative     UA: Bacteria few, Squamous  Epithelial 6, mucous present, o/w negative     ISTAT HCG quantitative pregnancy POCT: <5.0  BMP: Glucose 102, WNL (Creatinine 0.85)  CBC: WNL (WBC 6.2, HGB 12.2, )    Interventions:  2010: Rocephin 250 g IV   1757: Toradol 15 mg IV  1757: Zofran 4 mg IV    Emergency Department Course:  Past medical records, nursing notes, and vitals reviewed.  1715: I performed an exam of the patient and obtained history, as documented above.     IV inserted and blood drawn.    The patient was sent for a US pelvic complete w transvaginal and abdomen/pelvis duplex limited while in the emergency department, findings above.    2019: I rechecked the patient. Findings and plan explained to the Patient. Patient discharged home with instructions regarding supportive care, medications, and reasons to return. The importance of close follow-up was reviewed.       Impression & Plan      Medical Decision Makin-year-old female presented to the ED with midline low abdominal pain for the last 2 days.  Urinalysis is unremarkable.  Patient is not pregnant.  Pelvic ultrasound is without signs of acute disease.  Pelvic examination does reveal signs concerning for cervicitis.  She has no adnexal tenderness but does have abdominal tenderness.  It is uncertain whether this represents purely cervicitis or early developing PID.  No complicating factors such as salpingitis or tubo-ovarian abscess.  Patient was given IM Rocephin and will be discharged home on doxycycline.  There was a report of syphilis exposure.  She does not have overt signs of syphilis but given the exposure, syphilis testing undertaken.  I will have the patient closely follow-up with her primary care physician for results of her syphilis test and treatment if necessary.  She was made aware that if gonorrhea, chlamydia or syphilis tests are positive, I would recommend HIV testing with primary care physician.    Diagnosis:    ICD-10-CM    1. Cervicitis N72         Disposition:  discharged to home    Discharge Medications:     Medication List      Started    doxycycline hyclate 100 MG capsule  Commonly known as:  VIBRAMYCIN  100 mg, Oral, 2 TIMES DAILY     HYDROcodone-acetaminophen 5-325 MG tablet  Commonly known as:  NORCO  1 tablet, Oral, EVERY 6 HOURS PRN              Luigi Birmingham  1/30/2019   [facility] EMERGENCY DEPARTMENT    Scribe Disclosure:  I, Luigi Birmingham, am serving as a scribe at 5:15 PM on 1/30/2019 to document services personally performed by Parvez Farmer MD based on my observations and the provider's statements to me.          Parvez Farmer MD  01/30/19 4009

## 2019-01-30 NOTE — ED AVS SNAPSHOT
[facility] Emergency Department  [address]  [facility] [address]  Phone:  [phone]  Fax:  [phone]                                    Anna Qiu   MRN: [id number]    Department:  [facility] Emergency Department   Date of Visit:  1/30/2019           After Visit Summary Signature Page    I have received my discharge instructions, and my questions have been answered. I have discussed any challenges I see with this plan with the nurse or doctor.    ..........................................................................................................................................  Patient/Patient Representative Signature      ..........................................................................................................................................  Patient Representative Print Name and Relationship to Patient    ..................................................               ................................................  Date                                   Time    ..........................................................................................................................................  Reviewed by Signature/Title    ...................................................              ..............................................  Date                                               Time          22EPIC Rev 08/18

## 2019-01-31 LAB
C TRACH DNA SPEC QL NAA+PROBE: NEGATIVE
N GONORRHOEA DNA SPEC QL NAA+PROBE: NEGATIVE
SPECIMEN SOURCE: NORMAL
SPECIMEN SOURCE: NORMAL
T PALLIDUM AB SER QL: NONREACTIVE

## 2019-01-31 NOTE — RESULT ENCOUNTER NOTE
Final result for both N. Gonorrhoeae PCR and Chlamydia Trachomatis PCR are NEGATIVE.  No treatment or change in treatment per [place] ED Lab Result protocol.

## 2019-01-31 NOTE — ED NOTES
Pt complains of intermittent cramping low abdominal pain. Pain has subsided at present. No nausea or diarrhea.

## 2019-01-31 NOTE — DISCHARGE INSTRUCTIONS
Please follow-up with your primary care doctor in the next 3-5 days for the syphilis test results.  If your tests return positive for gonorrhea, chlamydia or syphilis, I do recommend baseline HIV testing through your primary care physician.

## 2019-01-31 NOTE — RESULT ENCOUNTER NOTE
Final result testing for Syphilis (Treponema pallidum antibody, IgG Serum or Anti-Treponema) is NEGATIVE.    No treatment or change in treatment per [place] ED Lab Result protocol.

## 2019-03-06 ENCOUNTER — HOSPITAL ENCOUNTER (EMERGENCY)
Facility: CLINIC | Age: 24
Discharge: HOME | End: 2019-03-06
Attending: EMERGENCY MEDICINE | Admitting: EMERGENCY MEDICINE
Payer: MEDICAID

## 2019-03-06 ENCOUNTER — APPOINTMENT (OUTPATIENT)
Dept: CT IMAGING | Facility: CLINIC | Age: 24
End: 2019-03-06
Attending: EMERGENCY MEDICINE
Payer: MEDICAID

## 2019-03-06 ENCOUNTER — APPOINTMENT (OUTPATIENT)
Dept: ULTRASOUND IMAGING | Facility: CLINIC | Age: 24
End: 2019-03-06
Attending: EMERGENCY MEDICINE
Payer: MEDICAID

## 2019-03-06 VITALS
TEMPERATURE: 98.9 F | OXYGEN SATURATION: 100 % | DIASTOLIC BLOOD PRESSURE: 79 MMHG | RESPIRATION RATE: 20 BRPM | HEART RATE: 72 BPM | SYSTOLIC BLOOD PRESSURE: 133 MMHG

## 2019-03-06 DIAGNOSIS — R10.84 ABDOMINAL PAIN, GENERALIZED: ICD-10-CM

## 2019-03-06 LAB
ALBUMIN SERPL-MCNC: 3.5 G/DL (ref 3.4–5)
ALBUMIN UR-MCNC: NEGATIVE MG/DL
ALP SERPL-CCNC: 87 U/L (ref 40–150)
ALT SERPL W P-5'-P-CCNC: 20 U/L (ref 0–50)
ANION GAP SERPL CALCULATED.3IONS-SCNC: 6 MMOL/L (ref 3–14)
APPEARANCE UR: CLEAR
AST SERPL W P-5'-P-CCNC: 11 U/L (ref 0–45)
B-HCG FREE SERPL-ACNC: <5 IU/L
BASOPHILS # BLD AUTO: 0 10E9/L (ref 0–0.2)
BASOPHILS NFR BLD AUTO: 0.4 %
BILIRUB SERPL-MCNC: 0.3 MG/DL (ref 0.2–1.3)
BILIRUB UR QL STRIP: NEGATIVE
BUN SERPL-MCNC: 9 MG/DL (ref 7–30)
CALCIUM SERPL-MCNC: 8.6 MG/DL (ref 8.5–10.1)
CHLORIDE SERPL-SCNC: 109 MMOL/L (ref 94–109)
CO2 SERPL-SCNC: 25 MMOL/L (ref 20–32)
COLOR UR AUTO: YELLOW
CREAT SERPL-MCNC: 0.83 MG/DL (ref 0.52–1.04)
DIFFERENTIAL METHOD BLD: ABNORMAL
EOSINOPHIL # BLD AUTO: 0.1 10E9/L (ref 0–0.7)
EOSINOPHIL NFR BLD AUTO: 1.8 %
ERYTHROCYTE [DISTWIDTH] IN BLOOD BY AUTOMATED COUNT: 13.2 % (ref 10–15)
GFR SERPL CREATININE-BSD FRML MDRD: >90 ML/MIN/{1.73_M2}
GLUCOSE SERPL-MCNC: 118 MG/DL (ref 70–99)
GLUCOSE UR STRIP-MCNC: NEGATIVE MG/DL
HCT VFR BLD AUTO: 38.8 % (ref 35–47)
HGB BLD-MCNC: 12.2 G/DL (ref 11.7–15.7)
HGB UR QL STRIP: ABNORMAL
IMM GRANULOCYTES # BLD: 0 10E9/L (ref 0–0.4)
IMM GRANULOCYTES NFR BLD: 0.2 %
KETONES UR STRIP-MCNC: NEGATIVE MG/DL
LEUKOCYTE ESTERASE UR QL STRIP: NEGATIVE
LIPASE SERPL-CCNC: 74 U/L (ref 73–393)
LYMPHOCYTES # BLD AUTO: 1.7 10E9/L (ref 0.8–5.3)
LYMPHOCYTES NFR BLD AUTO: 30.6 %
MCH RBC QN AUTO: 26.4 PG (ref 26.5–33)
MCHC RBC AUTO-ENTMCNC: 31.4 G/DL (ref 31.5–36.5)
MCV RBC AUTO: 84 FL (ref 78–100)
MONOCYTES # BLD AUTO: 0.5 10E9/L (ref 0–1.3)
MONOCYTES NFR BLD AUTO: 9.4 %
MUCOUS THREADS #/AREA URNS LPF: PRESENT /LPF
NEUTROPHILS # BLD AUTO: 3.1 10E9/L (ref 1.6–8.3)
NEUTROPHILS NFR BLD AUTO: 57.6 %
NITRATE UR QL: NEGATIVE
NRBC # BLD AUTO: 0 10*3/UL
NRBC BLD AUTO-RTO: 0 /100
PH UR STRIP: 7 PH (ref 5–7)
PLATELET # BLD AUTO: 300 10E9/L (ref 150–450)
POTASSIUM SERPL-SCNC: 3.5 MMOL/L (ref 3.4–5.3)
PROT SERPL-MCNC: 8 G/DL (ref 6.8–8.8)
RBC # BLD AUTO: 4.62 10E12/L (ref 3.8–5.2)
RBC #/AREA URNS AUTO: 2 /HPF (ref 0–2)
SODIUM SERPL-SCNC: 140 MMOL/L (ref 133–144)
SOURCE: ABNORMAL
SP GR UR STRIP: 1 (ref 1–1.03)
SQUAMOUS #/AREA URNS AUTO: <1 /HPF (ref 0–1)
UROBILINOGEN UR STRIP-MCNC: NEGATIVE MG/DL (ref 0–2)
WBC # BLD AUTO: 5.5 10E9/L (ref 4–11)
WBC #/AREA URNS AUTO: 2 /HPF (ref 0–5)

## 2019-03-06 PROCEDURE — 99285 EMERGENCY DEPT VISIT HI MDM: CPT | Mod: 25

## 2019-03-06 PROCEDURE — 93976 VASCULAR STUDY: CPT | Mod: XS

## 2019-03-06 PROCEDURE — 84702 CHORIONIC GONADOTROPIN TEST: CPT

## 2019-03-06 PROCEDURE — 74177 CT ABD & PELVIS W/CONTRAST: CPT

## 2019-03-06 PROCEDURE — 96375 TX/PRO/DX INJ NEW DRUG ADDON: CPT

## 2019-03-06 PROCEDURE — 25000128 H RX IP 250 OP 636: Performed by: EMERGENCY MEDICINE

## 2019-03-06 PROCEDURE — 83690 ASSAY OF LIPASE: CPT | Performed by: EMERGENCY MEDICINE

## 2019-03-06 PROCEDURE — 96361 HYDRATE IV INFUSION ADD-ON: CPT

## 2019-03-06 PROCEDURE — 96374 THER/PROPH/DIAG INJ IV PUSH: CPT | Mod: 59

## 2019-03-06 PROCEDURE — 81001 URINALYSIS AUTO W/SCOPE: CPT | Performed by: EMERGENCY MEDICINE

## 2019-03-06 PROCEDURE — 80053 COMPREHEN METABOLIC PANEL: CPT | Performed by: EMERGENCY MEDICINE

## 2019-03-06 PROCEDURE — 85025 COMPLETE CBC W/AUTO DIFF WBC: CPT | Performed by: EMERGENCY MEDICINE

## 2019-03-06 RX ORDER — METOCLOPRAMIDE HYDROCHLORIDE 5 MG/ML
10 INJECTION INTRAMUSCULAR; INTRAVENOUS ONCE
Status: COMPLETED | OUTPATIENT
Start: 2019-03-06 | End: 2019-03-06

## 2019-03-06 RX ORDER — ONDANSETRON 4 MG/1
4 TABLET, ORALLY DISINTEGRATING ORAL EVERY 6 HOURS PRN
Qty: 10 TABLET | Refills: 0 | Status: SHIPPED | OUTPATIENT
Start: 2019-03-06 | End: 2019-03-09

## 2019-03-06 RX ORDER — HYDROCODONE BITARTRATE AND ACETAMINOPHEN 5; 325 MG/1; MG/1
1 TABLET ORAL EVERY 6 HOURS PRN
Qty: 18 TABLET | Refills: 0 | Status: SHIPPED | OUTPATIENT
Start: 2019-03-06 | End: 2019-03-09

## 2019-03-06 RX ORDER — IOPAMIDOL 755 MG/ML
500 INJECTION, SOLUTION INTRAVASCULAR ONCE
Status: COMPLETED | OUTPATIENT
Start: 2019-03-06 | End: 2019-03-06

## 2019-03-06 RX ORDER — SODIUM CHLORIDE 9 MG/ML
1000 INJECTION, SOLUTION INTRAVENOUS CONTINUOUS
Status: DISCONTINUED | OUTPATIENT
Start: 2019-03-06 | End: 2019-03-07 | Stop reason: HOSPADM

## 2019-03-06 RX ORDER — HYDROMORPHONE HYDROCHLORIDE 1 MG/ML
0.5 INJECTION, SOLUTION INTRAMUSCULAR; INTRAVENOUS; SUBCUTANEOUS
Status: DISCONTINUED | OUTPATIENT
Start: 2019-03-06 | End: 2019-03-07 | Stop reason: HOSPADM

## 2019-03-06 RX ORDER — DIPHENHYDRAMINE HYDROCHLORIDE 50 MG/ML
25 INJECTION INTRAMUSCULAR; INTRAVENOUS ONCE
Status: COMPLETED | OUTPATIENT
Start: 2019-03-06 | End: 2019-03-06

## 2019-03-06 RX ADMIN — SODIUM CHLORIDE 65 ML: 9 INJECTION, SOLUTION INTRAVENOUS at 20:11

## 2019-03-06 RX ADMIN — DIPHENHYDRAMINE HYDROCHLORIDE 25 MG: 50 INJECTION, SOLUTION INTRAMUSCULAR; INTRAVENOUS at 19:31

## 2019-03-06 RX ADMIN — SODIUM CHLORIDE 1000 ML: 9 INJECTION, SOLUTION INTRAVENOUS at 19:33

## 2019-03-06 RX ADMIN — Medication 0.5 MG: at 19:33

## 2019-03-06 RX ADMIN — IOPAMIDOL 100 ML: 755 INJECTION, SOLUTION INTRAVENOUS at 20:11

## 2019-03-06 RX ADMIN — METOCLOPRAMIDE 10 MG: 5 INJECTION, SOLUTION INTRAMUSCULAR; INTRAVENOUS at 19:33

## 2019-03-06 ASSESSMENT — ENCOUNTER SYMPTOMS
NAUSEA: 1
ABDOMINAL PAIN: 1
VOMITING: 1

## 2019-03-06 NOTE — ED AVS SNAPSHOT
[facility] Emergency Department  [address]  [facility] [address]  Phone:  [phone]  Fax:  [phone]                                    Anna Qiu   MRN: [id number]    Department:  [facility] Emergency Department   Date of Visit:  3/6/2019           After Visit Summary Signature Page    I have received my discharge instructions, and my questions have been answered. I have discussed any challenges I see with this plan with the nurse or doctor.    ..........................................................................................................................................  Patient/Patient Representative Signature      ..........................................................................................................................................  Patient Representative Print Name and Relationship to Patient    ..................................................               ................................................  Date                                   Time    ..........................................................................................................................................  Reviewed by Signature/Title    ...................................................              ..............................................  Date                                               Time          22EPIC Rev 08/18

## 2019-03-07 NOTE — ED TRIAGE NOTES
Patient presents via EMS, was seen here last week for abdominal pain and discharged home, pain had subsided and then came back about 3 days ago, to include nausea and vomiting, EMS gave 4mg zofran and 15mg Toradol via 18g IV in left AC with little to no relief of pain and nausea, VSS, A&OX4

## 2019-03-07 NOTE — ED PROVIDER NOTES
History     Chief Complaint:  Abdominal Pain    HPI   Anna Qiu is a 23 year old female who presents to the emergency department today with abdominal pain. The patient reports nausea and abdominal pain in her lower abdomen for the last 4.5 weeks that has been getting worse.  Today the pain became much worse at 1400 (5.5 hours ago). The pain comes in waves, at worst 10/10, and at it's best a 5/10. This does not seem to get better or worse with movement or position. She reports some yellow vaginal discharge. She was seen 4 weeks ago in the ED after possible exposure to syphilis and was treated with antibiotics for possible fallopian tube infection. She believes she took all 2 weeks of antibiotics. Patient also states her period started today. Patient took Ibuprofen at 1600 and this did not help.     Allergies:  No Known Drug Allergies     Medications:    Tylenol  Advil    Past Medical History:    Atypical chest pain  Atopic dermatitis     Past Surgical History:    History reviewed. No pertinent past surgical history.     Family History:    Hypertension  Diabetes  Heart attack    Social History:  The patient was alone.  Smoking Status: Never  Smokeless Tobacco: Never  Alcohol Use: No    Marital Status:  Single     Review of Systems   Gastrointestinal: Positive for abdominal pain, nausea and vomiting.   Genitourinary: Positive for vaginal discharge.   All other systems reviewed and are negative.      Physical Exam     Patient Vitals for the past 24 hrs:   BP Temp Temp src Pulse Heart Rate Resp SpO2   03/06/19 2232 -- -- -- -- -- -- 100 %   03/06/19 2230 135/82 -- -- 75 -- -- 100 %   03/06/19 2217 -- -- -- -- -- -- 100 %   03/06/19 2100 122/70 -- -- 66 -- -- 100 %   03/06/19 2045 121/78 -- -- 69 -- -- 100 %   03/06/19 2030 111/56 -- -- 65 -- -- 99 %   03/06/19 2025 120/62 -- -- -- -- -- --   03/06/19 2005 -- -- -- -- -- -- 100 %   03/06/19 1855 -- -- -- -- -- -- 97 %   03/06/19 1845 (!) 142/92 -- -- 83 -- -- 100  %   03/06/19 1838 147/71 -- -- -- -- -- --   03/06/19 1830 147/71 -- -- 70 -- -- 98 %   03/06/19 1829 -- 98.9  F (37.2  C) Oral -- 88 20 98 %      Physical Exam  Constitutional:  Appears well-developed and well-nourished. Alert.  Tearful and crying due to pain.  Having a hard time sitting still in her bed because her pelvis and right abdomen/right flank are very uncomfortable.   HENT:   Head: Atraumatic.   Nose: Nose normal.  Mouth/Throat: Oral mucosa is clear and moist. no trismus. Pharynx normal. Tonsils symmetric. No tonsillar enlargement, erythema, or exudate.  Eyes: Conjunctivae normal. EOM normal. Pupils equal, round, and reactive to light. No scleral icterus.   Neck: Normal range of motion. Neck supple. No tracheal deviation present.   Cardiovascular: Normal rate, regular rhythm. No gallop. No friction rub. No murmur heard. Symmetric radial artery pulses   Pulmonary/Chest: Effort normal. No stridor. No respiratory distress. No wheezes. No rales. No rhonchi . No tenderness.   Abdominal: Soft. Bowel sounds normal. No distension. No mass.  Bilateral lower quadrant tenderness-right> left.  Also mild right upper quadrant and right CVA tenderness. No rebound. No guarding.   Musculoskeletal:   RUE: Normal range of motion. No tenderness. No deformity  LUE: Normal range of motion. No tenderness. No deformity  RLE: Normal range of motion. No edema. No tenderness. No deformity  LLE: Normal range of motion. No edema. No tenderness. No deformity  Lymph: No cervical adenopathy.   Neurological: Alert and oriented to person, place, and time. Normal strength. CN II-VII intact. No sensory deficit. GCS eye subscore is 4. GCS verbal subscore is 5. GCS motor subscore is 6. Normal coordination   Skin: Skin is warm and dry. No rash noted. No pallor. Normal capillary refill.  Psychiatric:  Normal mood. Normal affect.     Emergency Department Course   Imaging:  Radiology findings were communicated with the patient who voiced  understanding of the findings.  US Pelvis Cmplt w Transvag & Doppler LmtPel Duplex Limited   Preliminary Result   IMPRESSION: No torsion demonstrated.      CT Abdomen Pelvis w Contrast   Preliminary Result   IMPRESSION: No acute process demonstrated within the abdomen and   pelvis.          Report per radiology      Laboratory:  Laboratory findings were communicated with the patient who voiced understanding of the findings.  HCG: <5.0   Lipase: 74   CBC: AWNL (WBC 5.5, HGB 12.2, )   CMP: 118 Glucose o/w WNL (Creatinine 0.83)     Interventions:  1931: Benadryl 25mg IV   1933: Reglan 10mg IV   1933: Dilaudid 0.5mg IV    1933: 0.9% NaCl 1L IV Bolus   2011: 0.9% NaCl 65mL IV Bolus     Emergency Department Course:  Nursing notes and vitals reviewed.  1920: I performed an exam of the patient as documented above.   IV was inserted and blood was drawn for laboratory testing, results above.   The patient was sent for a CT Abdomen Pelvis, US Pelvis while in the emergency department, results above.   2248: Patient rechecked and updated.    2246: Findings and plan explained to the Patient. Patient discharged home with instructions regarding supportive care, medications, and reasons to return. The importance of close follow-up was reviewed. The patient was prescribed Norco and Zofran.    I personally reviewed the laboratory and imaging results with the Patient and answered all related questions prior to discharge.      Impression & Plan    Medical Decision Making:  Anna Qiu is a 23 year old female presented to the Emergency Department with abdominal pain involving both sides of her pelvis, but predominantly the right lower quadrant and right side of her abdomen.  The clinical exam today is non-specific.  The laboratory testing does not reveal a cause for the patient's pain.  CT scan of her abdomen pelvis and pelvic ultrasound with Doppler are normal.  The exact etiology of the abdominal pain is not clear at this  time.  Patient has similar episode of pain a month ago and had previous evaluation here in the ER that was also negative.  At that time she had a negative pelvic exam.  She has no concern for STD and declined repeat pelvic exam here today.  The differential diagnosis of abdominal pain includes: Ovarian cyst, torsion, adnexal mass, PID, appendicitis, Bowel Obstruction, Ulcer, Ischemia, Cholecystitis, Diverticulitis, Pancreatitis, UTI, kidney stone, Enteritis/Colitis, amongst many other etiologies.  No life threatening cause or need for emergent surgery or hospital admission is detected today.  The patient was advised that if symptoms do not completely resolve within another 12-24 hours re-evaluation with primary care or return to the ED is indicated. The pateient also understands that if they worsen, they should return to the ER right away. I discussed the uncertainty about the diagnosis and answered the patient's questions.      Diagnosis:    ICD-10-CM    1. Abdominal pain, generalized R10.84        Disposition:  discharged to home    Discharge Medications:     Medication List      Started    HYDROcodone-acetaminophen 5-325 MG tablet  Commonly known as:  NORCO  1 tablet, Oral, EVERY 6 HOURS PRN     ondansetron 4 MG ODT tab  Commonly known as:  ZOFRAN ODT  4 mg, Oral, EVERY 6 HOURS PRN            Scribe Disclosure:  I, Salena Fan, am serving as a scribe at 7:06 PM on 3/6/2019 to document services personally performed by Chadd Gordon, based on my observations and the provider's statements to me.    3/6/2019   [facility] EMERGENCY DEPARTMENT       Chadd Gordon MD  03/09/19 9458

## 2019-03-09 ENCOUNTER — HEALTH MAINTENANCE LETTER (OUTPATIENT)
Age: 24
End: 2019-03-09

## 2019-05-21 ENCOUNTER — APPOINTMENT (OUTPATIENT)
Dept: GENERAL RADIOLOGY | Facility: CLINIC | Age: 24
End: 2019-05-21
Attending: PHYSICIAN ASSISTANT
Payer: COMMERCIAL

## 2019-05-21 ENCOUNTER — HOSPITAL ENCOUNTER (EMERGENCY)
Facility: CLINIC | Age: 24
Discharge: HOME | End: 2019-05-21
Attending: PHYSICIAN ASSISTANT | Admitting: PHYSICIAN ASSISTANT
Payer: COMMERCIAL

## 2019-05-21 VITALS
OXYGEN SATURATION: 100 % | HEIGHT: 68 IN | SYSTOLIC BLOOD PRESSURE: 133 MMHG | RESPIRATION RATE: 20 BRPM | TEMPERATURE: 98 F | DIASTOLIC BLOOD PRESSURE: 92 MMHG | BODY MASS INDEX: 33.31 KG/M2 | WEIGHT: 219.8 LBS

## 2019-05-21 DIAGNOSIS — M25.562 CHRONIC PAIN OF LEFT KNEE: ICD-10-CM

## 2019-05-21 DIAGNOSIS — G89.29 CHRONIC PAIN OF LEFT KNEE: ICD-10-CM

## 2019-05-21 PROCEDURE — 99283 EMERGENCY DEPT VISIT LOW MDM: CPT

## 2019-05-21 PROCEDURE — 73562 X-RAY EXAM OF KNEE 3: CPT | Mod: LT

## 2019-05-21 PROCEDURE — 25000132 ZZH RX MED GY IP 250 OP 250 PS 637: Performed by: PHYSICIAN ASSISTANT

## 2019-05-21 RX ORDER — IBUPROFEN 600 MG/1
600 TABLET, FILM COATED ORAL ONCE
Status: COMPLETED | OUTPATIENT
Start: 2019-05-21 | End: 2019-05-21

## 2019-05-21 RX ORDER — ACETAMINOPHEN 500 MG
1000 TABLET ORAL ONCE
Status: COMPLETED | OUTPATIENT
Start: 2019-05-21 | End: 2019-05-21

## 2019-05-21 RX ADMIN — IBUPROFEN 600 MG: 600 TABLET ORAL at 11:02

## 2019-05-21 RX ADMIN — ACETAMINOPHEN 1000 MG: 500 TABLET, FILM COATED ORAL at 11:01

## 2019-05-21 ASSESSMENT — ENCOUNTER SYMPTOMS
COLOR CHANGE: 0
NUMBNESS: 0
WEAKNESS: 0
NAUSEA: 0
JOINT SWELLING: 0
MYALGIAS: 1
BACK PAIN: 0
VOMITING: 0
FEVER: 0

## 2019-05-21 ASSESSMENT — MIFFLIN-ST. JEOR: SCORE: 1810.5

## 2019-05-21 NOTE — ED PROVIDER NOTES
History     Chief Complaint:  Knee pain    HPI   Anna Qiu is a 23 year old female who presents to the ED for evaluation of knee pain. The patient reports having left knee pain over the past two years but over the last couple of weeks, the pain has gotten progressively worse. Last night, she was unable to move the leg due to how severe the pain was. Patient works as a , which is a very physically demanding job, buty denies spending a prolonged period of time on her knees. She did go to work this morning but was unable to work due to the pain. Here, patient is unable to bend the knee without significant pain. The pain worsens when using the stairs, bending the knee, and when lifting the left leg up. She also notes that when she isn't wearing a shoe on the left foot, her pain worsens. Patient reports the pain is along the anterior knee and shoots down the leg. She denies fever. There is no redness around the knee, streaking down or up the leg, or swelling of the knee. Patient denies any nausea or vomiting. She does note that her knee will give out on her sometimes. There is no family history of RA or knee arthritis that she is aware of. She denesi additional arthralgias. Tylenol does aid in some relief and she typically takes 2 tablets at night before bed; she has not taken any Tylenol today. Her grandma gave her a knee band that wraps just below the patella which she states has aided in some relief. She denies any recent trauma or fall. When this pain initially started, she thought it was due to her weight as she was obese. However, she has since lost weight but is still experiencing pain. Patient is not sexually active.     Allergies:  No known drug allergies     Medications:    The patient is not currently taking any prescribed medications.    Past Medical History:    The patient does not have any past pertinent medical history.    Past Surgical History:    History reviewed. No pertinent  "surgical history.    Family History:    History reviewed. No pertinent family history.     Social History:  Smoking status: Never  Alcohol use: Yes  Marital Status: Single      Review of Systems   Constitutional: Negative for fever.   Gastrointestinal: Negative for nausea and vomiting.   Musculoskeletal: Positive for gait problem and myalgias (left knee). Negative for back pain and joint swelling.   Skin: Negative for color change.   Neurological: Negative for weakness and numbness.   All other systems reviewed and are negative.    Physical Exam     Patient Vitals for the past 24 hrs:   BP Temp Temp src Heart Rate Resp SpO2 Height Weight   05/21/19 1032 (!) 133/92 98  F (36.7  C) Temporal 77 20 100 % 1.727 m (5' 8\") 99.7 kg (219 lb 12.8 oz)     Physical Exam  General:          Resting comfortably.  Alert and oriented.   Head:              The scalp, face, and head appear normal   Eyes:               Conjunctivae and sclerae are normal               ENT:                The oropharynx is normal                          Uvula is in the midline                           Moist mucous membranes       CV:                  Regular rate and rhythm                           Normal S1/S2                          DP and PT pulses intact to bilateral feet.                           Capillary refill is brisk in all digits of the left foot.   Resp:              Lungs are clear to auscultation                          Non-labored                          No rales or wheezing   MS:                  Tenderness to the left anterior knee, just inferior to the patella. Mild tenderness with manipulation of the patella. No medial/lateral joint line tenderness. No popliteal tenderness. Ligaments are clinically stable. Patient can fully extend the knee. Extensor mechanism is intact. Patient can flexion the knee, but this is painful. Passive flexion and extension is less painful.   Skin:               No rash or acute skin lesions noted. No " erythema/signs of cellulitis. No lacerations or abrasions, No swelling.   Neuro: Sensation intact to left lower extremity.     Emergency Department Course     Imaging:  Radiographic findings were communicated with the patient who voiced understanding of the findings.    X-ray Left Knee, 3 views:  IMPRESSION: Negative.  Result per radiology.     Interventions:  1101: Tylenol 1,000 mg tablet PO  1102: Ibuprofen 600mg tablet PO    Emergency Department Course:  Past medical records, nursing notes, and vitals reviewed.  1042: I performed an exam of the patient and obtained history, as documented above. GCS 15.    The patient was sent for a xray while in the emergency department, findings above.    1146: I rechecked the patient. Findings and plan explained to the Patient. Patient discharged home with instructions regarding supportive care, medications, and reasons to return. The importance of close follow-up was reviewed.      Impression & Plan      Medical Decision Making:  Anna Qiu is a 23 year old female who presents to the ED for evaluation of knee pain.  Patient states that she has had left knee pain for several years, but this is worsened over the last 2 weeks, and especially today, prompting her evaluation.  She denies ever being evaluated for this in the past that she thought would go away.  She denies any trauma when this pain started or recent trauma.  She denies being sexually active.  On exam, patient can fully extend and flex the knee, although this is painful especially with flexion.  There is no swelling, erythema, or signs of septic joint/cellulitis.  Patient is well-appearing and nonseptic.  X-ray negative for fracture, or any other acute abnormalities.  She has no other complaints.  She is not sexually active, I do not believe this represents gonococcal/chlamydial arthralgias.  Furthermore, there is no asymmetry or signs of DVT, therefore I do not believe ultrasound needs to be obtained at this  time. I think this is likely patellar tendonitis/subclinical bursitis. I believe she safe to discharge home with orthopedic follow-up and 3 to 5 days.  She will take Tylenol/ibuprofen and ice the area.  ACe wrap was placed. She will return immediately for any fevers, redness, increased pain, or any other concerns.  All questions were answered prior to discharge.  The patient understands and agrees to this plan.    Diagnosis:    ICD-10-CM   1. Chronic pain of left knee M25.562    G89.29       Disposition:  discharged to home      Sybil Bourgeois  5/21/2019   [facility] EMERGENCY DEPARTMENT  I, Sybil Bourgeois, am serving as a scribe at 10:42 AM on 5/21/2019 to document services personally performed by Carrie Brantley PA based on my observations and the provider's statements to me.        Carrie Brantley PA-C  05/21/19 1202

## 2019-05-21 NOTE — ED TRIAGE NOTES
Pt has left knee pain for several years.  Pain got worse last night while working as .  No known injury.

## 2019-05-21 NOTE — ED AVS SNAPSHOT
[facility] Emergency Department  [address]  [facility] [address]  Phone:  [phone]  Fax:  [phone]                                    Anna Qiu   MRN: [id number]    Department:  [facility] Emergency Department   Date of Visit:  5/21/2019           After Visit Summary Signature Page    I have received my discharge instructions, and my questions have been answered. I have discussed any challenges I see with this plan with the nurse or doctor.    ..........................................................................................................................................  Patient/Patient Representative Signature      ..........................................................................................................................................  Patient Representative Print Name and Relationship to Patient    ..................................................               ................................................  Date                                   Time    ..........................................................................................................................................  Reviewed by Signature/Title    ...................................................              ..............................................  Date                                               Time          22EPIC Rev 08/18

## 2019-05-21 NOTE — LETTER
May 21, 2019      To Whom It May Concern:      Anna Qiu was seen in our Emergency Department today, 05/21/19.  I expect her condition to improve over the next 1-2 days.  She may return to work/school when improved.    Sincerely,      Adán Chou RN

## 2019-05-21 NOTE — DISCHARGE INSTRUCTIONS
Follow-up with [facility] orthopedics in 1 week for recheck,  for evaluation.  Continue using ibuprofen/Tylenol.  Also consider ice/heat packs to the area.  Return immediately for fevers, redness, or any other concerns.

## 2019-07-24 ENCOUNTER — HOSPITAL ENCOUNTER (EMERGENCY)
Facility: CLINIC | Age: 24
Discharge: HOME | End: 2019-07-24
Attending: EMERGENCY MEDICINE | Admitting: EMERGENCY MEDICINE
Payer: COMMERCIAL

## 2019-07-24 VITALS
OXYGEN SATURATION: 99 % | SYSTOLIC BLOOD PRESSURE: 138 MMHG | RESPIRATION RATE: 18 BRPM | TEMPERATURE: 97 F | WEIGHT: 213 LBS | DIASTOLIC BLOOD PRESSURE: 96 MMHG | BODY MASS INDEX: 32.39 KG/M2

## 2019-07-24 DIAGNOSIS — R10.2 VAGINAL PAIN: ICD-10-CM

## 2019-07-24 LAB
ALBUMIN UR-MCNC: 20 MG/DL
APPEARANCE UR: CLEAR
BACTERIA #/AREA URNS HPF: ABNORMAL /HPF
BILIRUB UR QL STRIP: NEGATIVE
COLOR UR AUTO: YELLOW
GLUCOSE UR STRIP-MCNC: NEGATIVE MG/DL
HCG UR QL: NEGATIVE
HGB UR QL STRIP: NEGATIVE
KETONES UR STRIP-MCNC: ABNORMAL MG/DL
LEUKOCYTE ESTERASE UR QL STRIP: NEGATIVE
MUCOUS THREADS #/AREA URNS LPF: PRESENT /LPF
NITRATE UR QL: NEGATIVE
PH UR STRIP: 6 PH (ref 5–7)
RBC #/AREA URNS AUTO: 3 /HPF (ref 0–2)
SOURCE: ABNORMAL
SP GR UR STRIP: 1.03 (ref 1–1.03)
SPECIMEN SOURCE: NORMAL
SQUAMOUS #/AREA URNS AUTO: 3 /HPF (ref 0–1)
UROBILINOGEN UR STRIP-MCNC: 2 MG/DL (ref 0–2)
WBC #/AREA URNS AUTO: 7 /HPF (ref 0–5)
WET PREP SPEC: NORMAL

## 2019-07-24 PROCEDURE — 81025 URINE PREGNANCY TEST: CPT | Performed by: EMERGENCY MEDICINE

## 2019-07-24 PROCEDURE — 99284 EMERGENCY DEPT VISIT MOD MDM: CPT

## 2019-07-24 PROCEDURE — 87591 N.GONORRHOEAE DNA AMP PROB: CPT | Performed by: EMERGENCY MEDICINE

## 2019-07-24 PROCEDURE — 87210 SMEAR WET MOUNT SALINE/INK: CPT | Performed by: EMERGENCY MEDICINE

## 2019-07-24 PROCEDURE — 81001 URINALYSIS AUTO W/SCOPE: CPT | Performed by: EMERGENCY MEDICINE

## 2019-07-24 PROCEDURE — 87491 CHLMYD TRACH DNA AMP PROBE: CPT | Performed by: EMERGENCY MEDICINE

## 2019-07-24 RX ORDER — LIDOCAINE HYDROCHLORIDE AND EPINEPHRINE 10; 10 MG/ML; UG/ML
INJECTION, SOLUTION INFILTRATION; PERINEURAL
Status: DISCONTINUED
Start: 2019-07-24 | End: 2019-07-24 | Stop reason: HOSPADM

## 2019-07-24 ASSESSMENT — ENCOUNTER SYMPTOMS: ABDOMINAL PAIN: 0

## 2019-07-24 NOTE — ED TRIAGE NOTES
Lower \abd pain which woke her from sleep tonight, also states has some swelling on labia since yesterday no nausea

## 2019-07-24 NOTE — ED AVS SNAPSHOT
[facility] Emergency Department  [address]  [facility] [address]  Phone:  [phone]  Fax:  [phone]                                    Anna Qiu   MRN: [id number]    Department:  [facility] Emergency Department   Date of Visit:  7/24/2019           After Visit Summary Signature Page    I have received my discharge instructions, and my questions have been answered. I have discussed any challenges I see with this plan with the nurse or doctor.    ..........................................................................................................................................  Patient/Patient Representative Signature      ..........................................................................................................................................  Patient Representative Print Name and Relationship to Patient    ..................................................               ................................................  Date                                   Time    ..........................................................................................................................................  Reviewed by Signature/Title    ...................................................              ..............................................  Date                                               Time          22EPIC Rev 08/18

## 2019-07-24 NOTE — ED PROVIDER NOTES
"  History     Chief Complaint:  Pelvic Pain    HPI   Anna Qiu is a 23 year old female who presents with pelvic pain. The patient says that she noticed some swelling in her vaginal area yesterday, she also notes some \"minimal\" vaginal discharge yesterday. She says that the pain did not start until today and that it is a \"burning dry sensation\". She endorses a possible change of pregnancy. She denies any abdominal pain, new sexual partners, or any history of STD's.     Allergies:  No Known Drug Allergies      Medications:    Medications reviewed. No pertinent medications.     Past Medical History:    Past medical history reviewed. No pertinent medical history.     Past Surgical History:    Surgical history reviewed. No pertinent surgical history.    Family History:    Hypertension  Diabetes     Social History:  Smoking Status: Current Smoker  Smokeless Tobacco: Never Used  Alcohol Use: Negative  Drug Use: Negative  [facility]   Marital Status:  Single     Review of Systems   Gastrointestinal: Negative for abdominal pain.   Genitourinary: Positive for pelvic pain and vaginal discharge.        Vaginal swelling   All other systems reviewed and are negative.      Physical Exam     Patient Vitals for the past 24 hrs:   BP Temp Temp src Heart Rate Resp SpO2 Weight   07/24/19 0430 (!) 138/96 97  F (36.1  C) Temporal 79 18 99 % 96.6 kg (213 lb)        Physical Exam  Nursing note and vitals reviewed.  Constitutional: Cooperative.   HENT:   Mouth/Throat: Mucous membranes are normal.    Cardiovascular: Normal rate, regular rhythm and normal heart sounds.  No murmur.  Pulmonary/Chest: Effort normal and breath sounds normal. No respiratory distress. No wheezes. No rales.   Abdominal: Soft. Normal appearance and bowel sounds are normal. No distension. There is no tenderness. There is no rigidity and no guarding.   Pelvic: normal labia and external anatomy, no abscess or bartholin gland cyst. " Limited by presence of mono-stat cream in the vagina. No other concerning findings   Neurological: Alert. Oriented x4  Skin: Skin is warm and dry.   Psychiatric: Normal mood and affect.     Emergency Department Course     Laboratory:  Laboratory findings were communicated with the patient who voiced understanding of the findings.    Wet prep: WNL    Chlamydia trachomatis: in process  Neisseria gonorrhea: in process    UA: ketone trace (A), protein albumin 20 (A), WBC 7 (H), RBC 3 (H), bacteria few (A), squamous epithelial 3 (H), mucous present (A) o/w WNL  HCG qual: negative    Emergency Department Course:    0501 Nursing notes and vitals reviewed.    0506 I performed an exam of the patient as documented above.     0516 The patient provided a urine sample here in the emergency department. This was sent for laboratory testing, findings above.     0542 A vaginal sample was obtained for laboratory testing as documented above.     0642 The patient is discharged to home.     Impression & Plan      Medical Decision Making:  Anna Qiu is a 23 year old female who presents to the emergency department today for evaluation of pain in vaginal region. She has no abnormal finding on exam. Is concerned for bartholin gland cyst or abscess but there is no asymmetry or swelling of note. Exam is overall limited with presence of mono-stat cream in the vaginal vault. That said she did not have any vaginal discharge or malodor. Bacterial vaginosis is unlikely. She does not have any risk factors for pelvic inflammatory disease. No evidence of cellulitis, herpes genitalia, or other inflammatory cutaneous condition. At this time I have recommend close follow up with a regular physician and watchful waiting. I have clinic suspicion for ovarian torsion, cyst or other pelvic or abdominal pathologies is low.      Diagnosis:    ICD-10-CM    1. Vaginal pain R10.2      Disposition:   Findings and plan explained to the Patient. Patient  discharged home with instructions regarding supportive care, medications, and reasons to return. The importance of close follow-up was reviewed.     Discharge Medications:  No discharge medications.      Scribe Disclosure:  I, Paul Ortega, am serving as a scribe at 5:07 AM on 7/24/2019 to document services personally performed by Celestino Strickland MD based on my observations and the provider's statements to me.      [facility] EMERGENCY DEPARTMENT       Celestino Strickland MD  07/24/19 0703

## 2019-07-25 LAB
C TRACH DNA SPEC QL NAA+PROBE: NEGATIVE
N GONORRHOEA DNA SPEC QL NAA+PROBE: NEGATIVE
SPECIMEN SOURCE: NORMAL
SPECIMEN SOURCE: NORMAL

## 2019-11-06 ENCOUNTER — HEALTH MAINTENANCE LETTER (OUTPATIENT)
Age: 24
End: 2019-11-06

## 2020-01-03 NOTE — LETTER
[facility] EMERGENCY DEPARTMENT  [address]  [facility] [address]  [phone]    May 16, 2017    Anna Qiu  [address]   [facility] [address]  [phone] (home) none (work)    : 1995      To Whom it may concern:    Anna Qiu was seen in our Emergency Department today, May 16, 2017. Please excuse her for the time she missed from work. She may return without restriction.      Sincerely,            Abdifatah Sawyer D.O.    
Alert-The patient is alert, awake and responds to voice. The patient is oriented to time, place, and person. The triage nurse is able to obtain subjective information.

## 2020-05-28 ENCOUNTER — APPOINTMENT (OUTPATIENT)
Dept: GENERAL RADIOLOGY | Facility: CLINIC | Age: 25
End: 2020-05-28
Attending: EMERGENCY MEDICINE
Payer: COMMERCIAL

## 2020-05-28 ENCOUNTER — HOSPITAL ENCOUNTER (EMERGENCY)
Facility: CLINIC | Age: 25
Discharge: HOME | End: 2020-05-28
Attending: EMERGENCY MEDICINE | Admitting: EMERGENCY MEDICINE
Payer: COMMERCIAL

## 2020-05-28 VITALS
DIASTOLIC BLOOD PRESSURE: 86 MMHG | TEMPERATURE: 98.3 F | RESPIRATION RATE: 16 BRPM | SYSTOLIC BLOOD PRESSURE: 124 MMHG | OXYGEN SATURATION: 100 %

## 2020-05-28 DIAGNOSIS — S91.115A LACERATION OF LESSER TOE OF LEFT FOOT WITHOUT FOREIGN BODY PRESENT OR DAMAGE TO NAIL, INITIAL ENCOUNTER: ICD-10-CM

## 2020-05-28 PROCEDURE — 25000128 H RX IP 250 OP 636: Performed by: EMERGENCY MEDICINE

## 2020-05-28 PROCEDURE — 12001 RPR S/N/AX/GEN/TRNK 2.5CM/<: CPT

## 2020-05-28 PROCEDURE — 73660 X-RAY EXAM OF TOE(S): CPT | Mod: LT

## 2020-05-28 PROCEDURE — 25000132 ZZH RX MED GY IP 250 OP 250 PS 637: Performed by: EMERGENCY MEDICINE

## 2020-05-28 PROCEDURE — 90471 IMMUNIZATION ADMIN: CPT

## 2020-05-28 PROCEDURE — 90715 TDAP VACCINE 7 YRS/> IM: CPT | Performed by: EMERGENCY MEDICINE

## 2020-05-28 PROCEDURE — 99283 EMERGENCY DEPT VISIT LOW MDM: CPT | Mod: 25

## 2020-05-28 RX ORDER — ACETAMINOPHEN 325 MG/1
650 TABLET ORAL ONCE
Status: COMPLETED | OUTPATIENT
Start: 2020-05-28 | End: 2020-05-28

## 2020-05-28 RX ORDER — IBUPROFEN 600 MG/1
600 TABLET, FILM COATED ORAL ONCE
Status: COMPLETED | OUTPATIENT
Start: 2020-05-28 | End: 2020-05-28

## 2020-05-28 RX ORDER — LIDOCAINE HYDROCHLORIDE 10 MG/ML
5 INJECTION, SOLUTION EPIDURAL; INFILTRATION; INTRACAUDAL; PERINEURAL ONCE
Status: DISCONTINUED | OUTPATIENT
Start: 2020-05-28 | End: 2020-05-29 | Stop reason: HOSPADM

## 2020-05-28 RX ADMIN — IBUPROFEN 600 MG: 600 TABLET ORAL at 21:46

## 2020-05-28 RX ADMIN — ACETAMINOPHEN 650 MG: 325 TABLET, FILM COATED ORAL at 21:46

## 2020-05-28 RX ADMIN — CLOSTRIDIUM TETANI TOXOID ANTIGEN (FORMALDEHYDE INACTIVATED), CORYNEBACTERIUM DIPHTHERIAE TOXOID ANTIGEN (FORMALDEHYDE INACTIVATED), BORDETELLA PERTUSSIS TOXOID ANTIGEN (GLUTARALDEHYDE INACTIVATED), BORDETELLA PERTUSSIS FILAMENTOUS HEMAGGLUTININ ANTIGEN (FORMALDEHYDE INACTIVATED), BORDETELLA PERTUSSIS PERTACTIN ANTIGEN, AND BORDETELLA PERTUSSIS FIMBRIAE 2/3 ANTIGEN 0.5 ML: 5; 2; 2.5; 5; 3; 5 INJECTION, SUSPENSION INTRAMUSCULAR at 23:51

## 2020-05-28 ASSESSMENT — ENCOUNTER SYMPTOMS: WOUND: 1

## 2020-05-28 NOTE — ED AVS SNAPSHOT
[facility] Emergency Department  [address]  [facility] [address]  Phone:  [phone]  Fax:  [phone]                                    Anna Qiu   MRN: [id number]    Department:  [facility] Emergency Department   Date of Visit:  5/28/2020           After Visit Summary Signature Page    I have received my discharge instructions, and my questions have been answered. I have discussed any challenges I see with this plan with the nurse or doctor.    ..........................................................................................................................................  Patient/Patient Representative Signature      ..........................................................................................................................................  Patient Representative Print Name and Relationship to Patient    ..................................................               ................................................  Date                                   Time    ..........................................................................................................................................  Reviewed by Signature/Title    ...................................................              ..............................................  Date                                               Time          22EPIC Rev 08/18
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
## 2020-05-29 NOTE — DISCHARGE INSTRUCTIONS
Tylenol or ibuprofen as needed for pain.  May remove dressing in 24 hours and shower.  Then pat dry and redress.  Keep wound clean and dry otherwise. Do not keep submerged.  Follow up with primary care with sutures/staples to be removed in 7-10 days.  Return with signs of infection such as redness, discharge, or fever >101F, or if you have any other new or concerning symptoms.
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
## 2020-06-01 ASSESSMENT — ENCOUNTER SYMPTOMS
NUMBNESS: 0
NERVOUS/ANXIOUS: 1

## 2020-07-21 ENCOUNTER — OFFICE VISIT (OUTPATIENT)
Dept: URGENT CARE | Facility: URGENT CARE | Age: 25
End: 2020-07-21
Payer: COMMERCIAL

## 2020-07-21 VITALS
HEART RATE: 70 BPM | TEMPERATURE: 99.1 F | OXYGEN SATURATION: 100 % | DIASTOLIC BLOOD PRESSURE: 80 MMHG | RESPIRATION RATE: 16 BRPM | SYSTOLIC BLOOD PRESSURE: 122 MMHG

## 2020-07-21 DIAGNOSIS — R30.0 DYSURIA: ICD-10-CM

## 2020-07-21 DIAGNOSIS — R82.90 NONSPECIFIC FINDING ON EXAMINATION OF URINE: ICD-10-CM

## 2020-07-21 DIAGNOSIS — N92.6 MISSED PERIOD: Primary | ICD-10-CM

## 2020-07-21 DIAGNOSIS — N39.0 URINARY TRACT INFECTION WITHOUT HEMATURIA, SITE UNSPECIFIED: ICD-10-CM

## 2020-07-21 LAB
ALBUMIN UR-MCNC: NEGATIVE MG/DL
APPEARANCE UR: ABNORMAL
BACTERIA #/AREA URNS HPF: ABNORMAL /HPF
BILIRUB UR QL STRIP: NEGATIVE
COLOR UR AUTO: YELLOW
GLUCOSE UR STRIP-MCNC: NEGATIVE MG/DL
HCG UR QL: NEGATIVE
HGB UR QL STRIP: ABNORMAL
KETONES UR STRIP-MCNC: NEGATIVE MG/DL
LEUKOCYTE ESTERASE UR QL STRIP: ABNORMAL
NITRATE UR QL: NEGATIVE
NON-SQ EPI CELLS #/AREA URNS LPF: ABNORMAL /LPF
PH UR STRIP: 6.5 PH (ref 5–7)
RBC #/AREA URNS AUTO: ABNORMAL /HPF
SOURCE: ABNORMAL
SP GR UR STRIP: 1.02 (ref 1–1.03)
UROBILINOGEN UR STRIP-ACNC: 0.2 EU/DL (ref 0.2–1)
WBC #/AREA URNS AUTO: ABNORMAL /HPF

## 2020-07-21 PROCEDURE — 81001 URINALYSIS AUTO W/SCOPE: CPT | Performed by: PHYSICIAN ASSISTANT

## 2020-07-21 PROCEDURE — 99203 OFFICE O/P NEW LOW 30 MIN: CPT | Performed by: FAMILY MEDICINE

## 2020-07-21 PROCEDURE — 81025 URINE PREGNANCY TEST: CPT | Performed by: PHYSICIAN ASSISTANT

## 2020-07-21 PROCEDURE — 87086 URINE CULTURE/COLONY COUNT: CPT | Performed by: FAMILY MEDICINE

## 2020-07-21 RX ORDER — NITROFURANTOIN 25; 75 MG/1; MG/1
100 CAPSULE ORAL 2 TIMES DAILY
Qty: 14 CAPSULE | Refills: 0 | Status: SHIPPED | OUTPATIENT
Start: 2020-07-21 | End: 2020-07-28

## 2020-07-21 NOTE — PATIENT INSTRUCTIONS
"  Patient Education     Bladder Infection, Female (Adult)    Urine is normally doesn't have any bacteria in it. But bacteria can get into the urinary tract from the skin around the rectum. Or they can travel in the blood from elsewhere in the body. Once they are in your urinary tract, they can cause infection in the urethra (urethritis), the bladder (cystitis), or the kidneys (pyelonephritis).  The most common place for an infection is in the bladder. This is called a bladder infection. This is one of the most common infections in women. Most bladder infections are easily treated. They are not serious unless the infection spreads to the kidney.  The phrases \"bladder infection,\" \"UTI,\" and \"cystitis\" are often used to describe the same thing. But they are not always the same. Cystitis is an inflammation of the bladder. The most common cause of cystitis is an infection.  Symptoms  The infection causes inflammation in the urethra and bladder. This causes many of the symptoms. The most common symptoms of a bladder infection are:    Pain or burning when urinating    Having to urinate more often than usual    Urgent need to urinate    Only a small amount of urine comes out    Blood in urine    Abdominal discomfort. This is usually in the lower abdomen above the pubic bone.    Cloudy urine    Strong- or bad-smelling urine    Unable to urinate (urinary retention)    Unable to hold urine in (urinary incontinence)    Fever    Loss of appetite    Confusion (in older adults)  Causes  Bladder infections are not contagious. You can't get one from someone else, from a toilet seat, or from sharing a bath.  The most common cause of bladder infections is bacteria from the bowels. The bacteria get onto the skin around the opening of the urethra. From there, they can get into the urine and travel up to the bladder, causing inflammation and infection. This usually happens because of:    Wiping improperly after urinating. Always wipe " from front to back.    Bowel incontinence    Pregnancy    Procedures such as having a catheter inserted    Older age    Not emptying your bladder. This can allow bacteria a chance to grow in your urine.    Dehydration    Constipation    Sex    Use of a diaphragm for birth control   Treatment  Bladder infections are diagnosed by a urine test. They are treated with antibiotics and usually clear up quickly without complications. Treatment helps prevent a more serious kidney infection.  Medicines  Medicines can help in the treatment of a bladder infection:    Take antibiotics until they are used up, even if you feel better. It is important to finish them to make sure the infection has cleared.    You can use acetaminophen or ibuprofen for pain, fever, or discomfort, unless another medicine was prescribed. If you have chronic liver or kidney disease, talk with your healthcare provider before using these medicines. Also talk with your provider if you've ever had a stomach ulcer or gastrointestinal bleeding, or are taking blood-thinner medicines.    If you are given phenazopydridine to reduce burning with urination, it will cause your urine to become a bright orange color. This can stain clothing.  Care and prevention  These self-care steps can help prevent future infections:    Drink plenty of fluids to prevent dehydration and flush out your bladder. Do this unless you must restrict fluids for other health reasons, or your doctor told you not to.    Proper cleaning after going to the bathroom is important. Wipe from front to back after using the toilet to prevent the spread of bacteria.    Urinate more often. Don't try to hold urine in for a long time.    Wear loose-fitting clothes and cotton underwear. Avoid tight-fitting pants.    Improve your diet and prevent constipation. Eat more fresh fruit and vegetables, and fiber, and less junk and fatty foods.    Avoid sex until your symptoms are gone.    Avoid caffeine,  alcohol, and spicy foods. These can irritate your bladder.    Urinate right after intercourse to flush out your bladder.    If you use birth control pills and have frequent bladder infections, discuss it with your doctor.  Follow-up care  Call your healthcare provider if all symptoms are not gone after 3 days of treatment. This is especially important if you have repeat infections.  If a culture was done, you will be told if your treatment needs to be changed. If directed, you can call to find out the results.  If X-rays were done, you will be told if the results will affect your treatment.  Call 911  Call 911 if any of the following occur:    Trouble breathing    Hard to wake up or confusion    Fainting or loss of consciousness    Rapid heart rate  When to seek medical advice  Call your healthcare provider right away if any of these occur:    Fever of 100.4 F (38.0 C) or higher, or as directed by your healthcare provider    Symptoms are not better by the third day of treatment    Back or belly (abdominal) pain that gets worse    Repeated vomiting, or unable to keep medicine down    Weakness or dizziness    Vaginal discharge    Pain, redness, or swelling in the outer vaginal area (labia)  Date Last Reviewed: 10/1/2016    8253-0262 [employer]. [address] [place] [address], [place] [address]. All rights reserved. This information is not intended as a substitute for professional medical care. Always follow your healthcare professional's instructions.

## 2020-07-22 LAB
BACTERIA SPEC CULT: NORMAL
SPECIMEN SOURCE: NORMAL

## 2020-07-22 NOTE — PROGRESS NOTES
SUBJECTIVE:  Anna Qiu, a 24 year old female scheduled an appointment to discuss the following issues:     Missed period  Dysuria  Nonspecific finding on examination of urine  Urinary tract infection without hematuria, site unspecified    Medical, social, surgical, and family histories reviewed.    Abdominal Pain (dysuria, abdominal pain X 1 week)  Pt c/o suprapubic pain with dysuria, some urinary frequency and urgency for 1 week.  She also has some nausea but no vomiting.  LMP 6/19, currently spotting.  No concerns re STD, no abnormal vaginal discharge.    ROS:  See HPI.  No fever/chills.  No chest pain/SOB.  No BM problems.  No dizziness or syncope.      OBJECTIVE:  /80   Pulse 70   Temp 99.1  F (37.3  C) (Tympanic)   Resp 16   LMP 06/19/2020   SpO2 100%   EXAM:  GENERAL APPEARANCE:  alert and mild distress; ?low grade temp; no cyanosis or accessory muscle use; moist mucus membrane  EYES: Eyes grossly normal to inspection, PERRL and conjunctivae and sclerae normal  HENT: ear canals and TM's normal and nose and mouth without ulcers or lesions  NECK: no adenopathy, no asymmetry, masses, or scars and neck normal to palpation  RESP: lungs clear to auscultation - no rales, rhonchi or wheezes  CV: regular rates and rhythm, normal S1 S2, no S3 or S4 and no murmur, click or rub  LYMPHATICS: no cervical adenopathy  ABDOMEN: soft, mild suprapubic tenderness, no rebound, without hepatosplenomegaly or masses and bowel sounds normal; no CVA tenderness  MS: extremities normal- no gross deformities noted  SKIN: no suspicious lesions or rashes  NEURO: Normal strength and tone, mentation intact and speech normal      ASSESSMENT/PLAN:  (N92.6) Missed period  (primary encounter diagnosis)  Comment: urine pregnancy negative  Plan: HCG Qual, Urine ([id number])        (R30.0) Dysuria  Comment: urinalysis showed significant leukocytosis and bacteruria  Plan: UA with Microscopic reflex to Culture, Urine         Culture  Aerobic Bacterial          (R82.90) Nonspecific finding on examination of urine  Plan: Urine Culture Aerobic Bacterial         (N39.0) Urinary tract infection without hematuria, site unspecified  Plan: nitroFURantoin macrocrystal-monohydrate         (MACROBID) 100 MG capsule        Care instructions given.  Pt to f/up PCP in 1 week if no improvement or new problems arise.  Warning signs and symptoms explained---be seen ASAP if worsening.

## 2020-11-29 ENCOUNTER — HEALTH MAINTENANCE LETTER (OUTPATIENT)
Age: 25
End: 2020-11-29

## 2021-02-27 ENCOUNTER — OFFICE VISIT (OUTPATIENT)
Dept: URGENT CARE | Facility: URGENT CARE | Age: 26
End: 2021-02-27
Payer: COMMERCIAL

## 2021-02-27 VITALS
DIASTOLIC BLOOD PRESSURE: 71 MMHG | OXYGEN SATURATION: 98 % | HEIGHT: 68 IN | SYSTOLIC BLOOD PRESSURE: 126 MMHG | WEIGHT: 225 LBS | HEART RATE: 69 BPM | RESPIRATION RATE: 16 BRPM | BODY MASS INDEX: 34.1 KG/M2 | TEMPERATURE: 98.3 F

## 2021-02-27 DIAGNOSIS — B96.89 BV (BACTERIAL VAGINOSIS): ICD-10-CM

## 2021-02-27 DIAGNOSIS — R82.90 NONSPECIFIC FINDING ON EXAMINATION OF URINE: Primary | ICD-10-CM

## 2021-02-27 DIAGNOSIS — R31.9 URINARY TRACT INFECTION WITH HEMATURIA, SITE UNSPECIFIED: ICD-10-CM

## 2021-02-27 DIAGNOSIS — N76.0 BV (BACTERIAL VAGINOSIS): ICD-10-CM

## 2021-02-27 DIAGNOSIS — N39.0 URINARY TRACT INFECTION WITH HEMATURIA, SITE UNSPECIFIED: ICD-10-CM

## 2021-02-27 DIAGNOSIS — R10.9 ABDOMINAL PAIN, UNSPECIFIED ABDOMINAL LOCATION: ICD-10-CM

## 2021-02-27 DIAGNOSIS — R10.32 LLQ ABDOMINAL PAIN: ICD-10-CM

## 2021-02-27 LAB
ALBUMIN UR-MCNC: NEGATIVE MG/DL
APPEARANCE UR: CLEAR
BASOPHILS # BLD AUTO: 0 10E9/L (ref 0–0.2)
BASOPHILS NFR BLD AUTO: 0.3 %
BILIRUB UR QL STRIP: NEGATIVE
COLOR UR AUTO: YELLOW
DIFFERENTIAL METHOD BLD: NORMAL
EOSINOPHIL # BLD AUTO: 0.1 10E9/L (ref 0–0.7)
EOSINOPHIL NFR BLD AUTO: 2.1 %
ERYTHROCYTE [DISTWIDTH] IN BLOOD BY AUTOMATED COUNT: 13.2 % (ref 10–15)
GLUCOSE UR STRIP-MCNC: NEGATIVE MG/DL
HCG UR QL: NEGATIVE
HCT VFR BLD AUTO: 37.7 % (ref 35–47)
HGB BLD-MCNC: 11.9 G/DL (ref 11.7–15.7)
HGB UR QL STRIP: ABNORMAL
KETONES UR STRIP-MCNC: NEGATIVE MG/DL
LEUKOCYTE ESTERASE UR QL STRIP: ABNORMAL
LYMPHOCYTES # BLD AUTO: 2.5 10E9/L (ref 0.8–5.3)
LYMPHOCYTES NFR BLD AUTO: 41.1 %
Lab: ABNORMAL
MCH RBC QN AUTO: 26.8 PG (ref 26.5–33)
MCHC RBC AUTO-ENTMCNC: 31.6 G/DL (ref 31.5–36.5)
MCV RBC AUTO: 85 FL (ref 78–100)
MONOCYTES # BLD AUTO: 0.5 10E9/L (ref 0–1.3)
MONOCYTES NFR BLD AUTO: 8 %
NEUTROPHILS # BLD AUTO: 3 10E9/L (ref 1.6–8.3)
NEUTROPHILS NFR BLD AUTO: 48.5 %
NITRATE UR QL: NEGATIVE
NON-SQ EPI CELLS #/AREA URNS LPF: ABNORMAL /LPF
PH UR STRIP: 6.5 PH (ref 5–7)
PLATELET # BLD AUTO: 285 10E9/L (ref 150–450)
RBC # BLD AUTO: 4.44 10E12/L (ref 3.8–5.2)
RBC #/AREA URNS AUTO: ABNORMAL /HPF
SOURCE: ABNORMAL
SP GR UR STRIP: 1.02 (ref 1–1.03)
SPECIMEN SOURCE: ABNORMAL
UROBILINOGEN UR STRIP-ACNC: 0.2 EU/DL (ref 0.2–1)
WBC # BLD AUTO: 6.2 10E9/L (ref 4–11)
WBC #/AREA URNS AUTO: ABNORMAL /HPF
WET PREP SPEC: ABNORMAL

## 2021-02-27 PROCEDURE — 87210 SMEAR WET MOUNT SALINE/INK: CPT | Performed by: PHYSICIAN ASSISTANT

## 2021-02-27 PROCEDURE — 85025 COMPLETE CBC W/AUTO DIFF WBC: CPT | Performed by: PHYSICIAN ASSISTANT

## 2021-02-27 PROCEDURE — 81001 URINALYSIS AUTO W/SCOPE: CPT | Performed by: PHYSICIAN ASSISTANT

## 2021-02-27 PROCEDURE — 81025 URINE PREGNANCY TEST: CPT | Performed by: PHYSICIAN ASSISTANT

## 2021-02-27 PROCEDURE — 87086 URINE CULTURE/COLONY COUNT: CPT | Performed by: PHYSICIAN ASSISTANT

## 2021-02-27 PROCEDURE — 36415 COLL VENOUS BLD VENIPUNCTURE: CPT | Performed by: PHYSICIAN ASSISTANT

## 2021-02-27 PROCEDURE — 99214 OFFICE O/P EST MOD 30 MIN: CPT | Performed by: PHYSICIAN ASSISTANT

## 2021-02-27 RX ORDER — METRONIDAZOLE 500 MG/1
500 TABLET ORAL 2 TIMES DAILY
Qty: 14 TABLET | Refills: 0 | Status: SHIPPED | OUTPATIENT
Start: 2021-02-27 | End: 2021-03-06

## 2021-02-27 RX ORDER — ACETAMINOPHEN 160 MG/5ML
499 LIQUID ORAL
COMMUNITY
Start: 2021-02-23 | End: 2024-04-10

## 2021-02-27 RX ORDER — IBUPROFEN 100 MG/5ML
400-600 SUSPENSION, ORAL (FINAL DOSE FORM) ORAL
COMMUNITY
Start: 2021-02-23 | End: 2024-04-10

## 2021-02-27 RX ORDER — AMOXICILLIN 250 MG/5ML
500 POWDER, FOR SUSPENSION ORAL
COMMUNITY
Start: 2021-02-23 | End: 2021-03-05

## 2021-02-27 RX ORDER — NORELGESTROMIN AND ETHINYL ESTRADIOL 35; 150 UG/MG; UG/MG
1 PATCH TRANSDERMAL
COMMUNITY
Start: 2020-10-14 | End: 2024-04-10

## 2021-02-27 RX ORDER — CIPROFLOXACIN 500 MG/1
500 TABLET, FILM COATED ORAL 2 TIMES DAILY
Qty: 14 TABLET | Refills: 0 | Status: SHIPPED | OUTPATIENT
Start: 2021-02-27 | End: 2021-03-06

## 2021-02-27 ASSESSMENT — MIFFLIN-ST. JEOR: SCORE: 1824.09

## 2021-02-27 NOTE — PROGRESS NOTES
"    Assessment & Plan     Abdominal pain, unspecified abdominal location  Urine HCG negative for ectopic pregnancy  Wet positive for BV  UA negative for infection  CBC negative for anemia, infection  - UA with Microscopic reflex to Culture  - HCG Qual, Urine ([id number])  - Wet prep  - CBC with platelets differential    LLQ abdominal pain  D/D of UTI, ovarian cyst, diverticulitis, BV, STD    BV (bacterial vaginosis)  Treated with flagyl, no alcohol on medicatoin  - metroNIDAZOLE (FLAGYL) 500 MG tablet; Take 1 tablet (500 mg) by mouth 2 times daily for 7 days    Nonspecific finding on examination of urine  UA positive for infection  Start on Cipro  - Urine Culture Aerobic Bacterial    Urinary tract infection with hematuria, site unspecified  Cipro for infection  - ciprofloxacin (CIPRO) 500 MG tablet; Take 1 tablet (500 mg) by mouth 2 times daily for 7 days    Review of external notes as documented elsewhere in note         Tobacco Cessation:   reports that she has been smoking. She has been smoking about 0.25 packs per day. She has never used smokeless tobacco.      BMI:   Estimated body mass index is 34.21 kg/m  as calculated from the following:    Height as of this encounter: 1.727 m (5' 8\").    Weight as of this encounter: 102.1 kg (225 lb).   Weight management plan: Discussed healthy diet and exercise guidelines    Follow up as needed    Andrae Baron PA-C  [facility] CenterJOSE Castillo is a 25 year old who presents for the following health issues  accompanied by her daughter:    HPI     Left lower abdominal pain  Seen in the ED and PN UC, negative work up  Ongoing left lower abdominal tenderness    Review of Systems   Constitutional, HEENT, cardiovascular, pulmonary, gi and gu systems are negative, except as otherwise noted.      Objective    /71   Pulse 69   Temp 98.3  F (36.8  C)   Resp 16   Ht 1.727 m (5' 8\")   Wt 102.1 kg (225 lb)   SpO2 98%   BMI 34.21 kg/m  "   Body mass index is 34.21 kg/m .  Physical Exam   GENERAL: healthy, alert and no distress  NECK: no adenopathy, no asymmetry, masses, or scars and thyroid normal to palpation  RESP: lungs clear to auscultation - no rales, rhonchi or wheezes  CV: regular rate and rhythm, normal S1 S2, no S3 or S4, no murmur, click or rub, no peripheral edema and peripheral pulses strong  ABDOMEN: tenderness LLQ  MS: no gross musculoskeletal defects noted, no edema  SKIN: no suspicious lesions or rashes  NEURO: Normal strength and tone, mentation intact and speech normal  PSYCH: mentation appears normal, affect normal/bright    Results for orders placed or performed in visit on 02/27/21   UA with Microscopic reflex to Culture     Status: Abnormal    Specimen: Midstream Urine   Result Value Ref Range    Color Urine Yellow     Appearance Urine Clear     Glucose Urine Negative NEG^Negative mg/dL    Bilirubin Urine Negative NEG^Negative    Ketones Urine Negative NEG^Negative mg/dL    Specific Gravity Urine 1.020 1.003 - 1.035    pH Urine 6.5 5.0 - 7.0 pH    Protein Albumin Urine Negative NEG^Negative mg/dL    Urobilinogen Urine 0.2 0.2 - 1.0 EU/dL    Nitrite Urine Negative NEG^Negative    Blood Urine Trace (A) NEG^Negative    Leukocyte Esterase Urine Moderate (A) NEG^Negative    Source Midstream Urine     WBC Urine 5-10 (A) OTO5^0 - 5 /HPF    RBC Urine O - 2 OTO2^O - 2 /HPF    Squamous Epithelial /LPF Urine Many (A) FEW^Few /LPF   HCG Qual, Urine ([id number])     Status: None   Result Value Ref Range    HCG Qual Urine Negative NEG^Negative   CBC with platelets differential     Status: None   Result Value Ref Range    WBC 6.2 4.0 - 11.0 10e9/L    RBC Count 4.44 3.8 - 5.2 10e12/L    Hemoglobin 11.9 11.7 - 15.7 g/dL    Hematocrit 37.7 35.0 - 47.0 %    MCV 85 78 - 100 fl    MCH 26.8 26.5 - 33.0 pg    MCHC 31.6 31.5 - 36.5 g/dL    RDW 13.2 10.0 - 15.0 %    Platelet Count 285 150 - 450 10e9/L    % Neutrophils 48.5 %    % Lymphocytes 41.1 %    %  Monocytes 8.0 %    % Eosinophils 2.1 %    % Basophils 0.3 %    Absolute Neutrophil 3.0 1.6 - 8.3 10e9/L    Absolute Lymphocytes 2.5 0.8 - 5.3 10e9/L    Absolute Monocytes 0.5 0.0 - 1.3 10e9/L    Absolute Eosinophils 0.1 0.0 - 0.7 10e9/L    Absolute Basophils 0.0 0.0 - 0.2 10e9/L    Diff Method Automated Method    Wet prep     Status: Abnormal    Specimen: Vagina   Result Value Ref Range    Specimen Description Vagina     Special Requests Vagina     Wet Prep No Trichomonas seen     Wet Prep Clue cells seen  Many   (A)     Wet Prep No yeast seen     Wet Prep WBC'S seen  Many

## 2021-02-27 NOTE — PATIENT INSTRUCTIONS
Patient Education     Bacterial Vaginosis    You have a vaginal infection called bacterial vaginosis (BV). Both good and bad bacteria are present in a healthy vagina. BV occurs when these bacteria get out of balance. The number of bad bacteria increase. And the number of good bacteria decrease. BV is linked with sexual activity, but it's not a sexually transmitted infection (STI).   BV may or may not cause symptoms. If symptoms do occur, they can include:     Thin, gray, milky-white, or sometimes green discharge    Unpleasant odor or  fishy  smell    Itching, burning, or pain in or around the vagina  It is not known what causes BV, but certain factors can make the problem more likely. These can include:     Douching    Spermicides    Use of antibiotics    Change in hormone levels with pregnancy, breastfeeding, or menopause    Having sex with a new partner    Having sex with more than one partner  BV will sometimes go away on its own. But treatment is often advised. This is because untreated BV can raise the risk of more serious health problems such as:     Pelvic inflammatory disease (PID)     delivery (giving birth to a baby early if you re pregnant)    HIV and some other sexually transmitted infections (STIs)    Infection after surgery on the reproductive organs  Home care  General care    BV is most often treated with medicines called antibiotics. These may be given as pills or as a vaginal cream. If antibiotics are prescribed, be sure to use them exactly as directed. And complete all of the medicine, even if your symptoms go away.    Don't douche or having sex during treatment.    If you have sex with a female partner, ask your healthcare provider if she should also be treated.  Prevention    Don't douche.    Don't have sex. If you do have sex, then take steps to lower your risk:  ? Use condoms when having sex.  ? Limit the number of sex partners you have.    Follow-up care  Follow up with your  healthcare provider, or as advised.   When to get medical advice  Call your healthcare provider right away if:     You have a fever of 100.4 F (38 C) or higher, or as directed by your provider.    Your symptoms get worse, or they don t go away within a few days of starting treatment.    You have new pain in the lower belly or pelvic region.    You have side effects that bother you or a reaction to the pills or cream you re prescribed.    You or any of your sex partners have new symptoms, such as a rash, joint pain, or sores.  [employer] last reviewed this educational content on 6/1/2020 2000-2020 [employer] [address] [place] [address], [place] [address]. All rights reserved. This information is not intended as a substitute for professional medical care. Always follow your healthcare professional's instructions.

## 2021-02-28 LAB
BACTERIA SPEC CULT: NORMAL
Lab: NORMAL
SPECIMEN SOURCE: NORMAL

## 2021-07-16 ENCOUNTER — E-VISIT (OUTPATIENT)
Dept: URGENT CARE | Facility: URGENT CARE | Age: 26
End: 2021-07-16
Payer: COMMERCIAL

## 2021-07-16 DIAGNOSIS — N76.0 ACUTE VAGINITIS: ICD-10-CM

## 2021-07-16 DIAGNOSIS — N89.8 VAGINAL DISCHARGE: ICD-10-CM

## 2021-07-16 DIAGNOSIS — R30.0 DYSURIA: Primary | ICD-10-CM

## 2021-07-16 PROCEDURE — 99421 OL DIG E/M SVC 5-10 MIN: CPT | Performed by: PREVENTIVE MEDICINE

## 2021-07-16 NOTE — PATIENT INSTRUCTIONS
Dear Anna Qiu,    We are sorry you are not feeling well. Based on the responses you provided, it is recommended that you be seen in-person in urgent care so we can better evaluate your symptoms. Please click here to find the nearest urgent care location to you.   You will not be charged for this Visit. Thank you for trusting us with your care.    Low Qiu MD, MD    Dear Anna Qiu,    We are sorry you are not feeling well. Based on the responses you provided, it is recommended that you be seen in-person in urgent care so we can better evaluate your symptoms. Please click here to find the nearest urgent care location to you.   You will not be charged for this Visit. Thank you for trusting us with your care.    Low Qiu MD, MD      Preventing Vaginitis     Use mild, unscented soap when you bathe or shower to avoid irritating your vagina.    Vaginitis is irritation or infection of the vagina or the outside opening of it (vulva). Vaginitis can be caused by bacteria, viruses, parasites, or yeast. Chemicals such as in perfumes or soaps or in spermicides can sometimes be a cause. Vaginitis can be caused by hormone changes in pregnancy or with menopause. You can help prevent vaginitis. Follow the tips below. And see your healthcare provider if you have any symptoms.   Hygiene    Stay away from chemicals. Don't use vaginal sprays. Don't use scented toilet paper or tampons that are scented. Sprays and scents have chemicals that can irritate your vagina.    Don't douche unless you are told to by your healthcare provider. Douching is rarely needed. And it upsets the normal balance in the vagina.    Wash yourself well. Wash the outer vaginal area (vulva) every day with mild, unscented soap. Keep it as dry as possible.    Wipe correctly. Make sure to wipe from front to back after a bowel movement. This helps keep from spreading bacteria from your anus to your  vagina.    Change your tampon often. During your period, make sure to change your tampon as often as directed on the package. This allows the normal flow of vaginal discharge and blood.    Lifestyle    Limit your number of sexual partners. The more partners you have, the greater your risk of infection. Using condoms helps reduce your risk.    Get enough sleep. Sleep helps keep your body s immune system healthy. This helps you fight infection.    Lose weight, if needed. Excess weight can reduce air circulation around your vagina. This can increase your risk of infection.    Exercise regularly. Regular activity helps keep your body healthy.    Take antibiotics only as directed. Antibiotics can change the normal chemical balance in the vagina.      Clothing    Don t sit in wet clothes. Yeast thrives when it s warm and damp.    Don t wear tight pants. And don t wear tights, leggings, or hose without a cotton crotch. These types of clothing trap warmth and moisture.    Wear cotton underwear. Cotton lets air circulate around the vagina.    Symptoms of vaginitis    Irritation, swelling, or itching of the genital area    Vaginal discharge    Bad vaginal odor    Pain or burning during urination    [employer] last reviewed this educational content on 11/1/2019 2000-2021 [employer] All rights reserved. This information is not intended as a substitute for professional medical care. Always follow your healthcare professional's instructions.        Dear Anna Qiu,    We are sorry you are not feeling well. Based on the responses you provided, it is recommended that you be seen in-person in urgent care so we can better evaluate your symptoms. Please click here to find the nearest urgent care location to you.   You will not be charged for this Visit. Thank you for trusting us with your care.    Low Qiu MD, MD    Please go the urgent care to get further evaluated.  Fabien Castillo  Lazarus,    We are sorry you are not feeling well. Based on the responses you provided, it is recommended that you be seen in-person in urgent care so we can better evaluate your symptoms. Please click here to find the nearest urgent care location to you.   You will not be charged for this Visit. Thank you for trusting us with your care.    Low Qiu MD, MD      Preventing Vaginitis     Use mild, unscented soap when you bathe or shower to avoid irritating your vagina.    Vaginitis is irritation or infection of the vagina or the outside opening of it (vulva). Vaginitis can be caused by bacteria, viruses, parasites, or yeast. Chemicals such as in perfumes or soaps or in spermicides can sometimes be a cause. Vaginitis can be caused by hormone changes in pregnancy or with menopause. You can help prevent vaginitis. Follow the tips below. And see your healthcare provider if you have any symptoms.   Hygiene    Stay away from chemicals. Don't use vaginal sprays. Don't use scented toilet paper or tampons that are scented. Sprays and scents have chemicals that can irritate your vagina.    Don't douche unless you are told to by your healthcare provider. Douching is rarely needed. And it upsets the normal balance in the vagina.    Wash yourself well. Wash the outer vaginal area (vulva) every day with mild, unscented soap. Keep it as dry as possible.    Wipe correctly. Make sure to wipe from front to back after a bowel movement. This helps keep from spreading bacteria from your anus to your vagina.    Change your tampon often. During your period, make sure to change your tampon as often as directed on the package. This allows the normal flow of vaginal discharge and blood.    Lifestyle    Limit your number of sexual partners. The more partners you have, the greater your risk of infection. Using condoms helps reduce your risk.    Get enough sleep. Sleep helps keep your body s immune system healthy. This  helps you fight infection.    Lose weight, if needed. Excess weight can reduce air circulation around your vagina. This can increase your risk of infection.    Exercise regularly. Regular activity helps keep your body healthy.    Take antibiotics only as directed. Antibiotics can change the normal chemical balance in the vagina.      Clothing    Don t sit in wet clothes. Yeast thrives when it s warm and damp.    Don t wear tight pants. And don t wear tights, leggings, or hose without a cotton crotch. These types of clothing trap warmth and moisture.    Wear cotton underwear. Cotton lets air circulate around the vagina.    Symptoms of vaginitis    Irritation, swelling, or itching of the genital area    Vaginal discharge    Bad vaginal odor    Pain or burning during urination    [employer] last reviewed this educational content on 11/1/2019 2000-2021 [employer]. All rights reserved. This information is not intended as a substitute for professional medical care. Always follow your healthcare professional's instructions.

## 2021-09-06 ENCOUNTER — NURSE TRIAGE (OUTPATIENT)
Dept: NURSING | Facility: CLINIC | Age: 26
End: 2021-09-06

## 2021-09-06 NOTE — TELEPHONE ENCOUNTER
Reason for Disposition    Minor injury or bruising from direct blow    Additional Information    Negative: Serious injury with multiple fractures (broken bones)    Negative: [1] Major bleeding (e.g., actively dripping or spurting) AND [2] can't be stopped    Negative: Amputation    Negative: Sounds like a life-threatening emergency to the triager    Negative: Finger injury is main concern    Negative: Caused by an animal bite    Negative: Caused by a human bite    Negative: Wound looks infected    Negative: Cast problems or questions    Negative: Bullet wound, stabbed by knife, or other serious penetrating wound    Negative: Looks like a broken bone (e.g., knuckle is gone or depressed)    Negative: Looks like a dislocated joint (e.g., crooked or deformed)    Negative: Cut over knuckle (MCP joint)    Negative: Skin is split open or gaping  (or length > 1/2 inch or 12 mm)    Negative: [1] Bleeding AND [2] won't stop after 10 minutes of direct pressure (using correct technique)    Negative: [1] Dirt in the wound AND [2] not removed with 15 minutes of scrubbing    Negative: [1] Numbness (loss of sensation) of finger(s) AND [2] present now    Negative: High pressure injection injury (e.g., from grease gun or paint gun, usually work-related)    Negative: Sounds like a serious injury to the triager    Negative: [1] SEVERE pain AND [2] not improved 2 hours after pain medicine/ice packs    Negative: [1] Large swelling or bruise (> 2 inches or 5 cm) AND [2] can't use injured hand normally (e.g., make a fist, open fully, hold a glass of water)    Negative: Suspicious history for the injury    Negative: Can't use injured hand normally (e.g., make a fist, open fully, hold a glass of water)    Negative: Large swelling or bruise (> 2 inches or 5 cm)    Negative: [1] High-risk adult (e.g., age > 60, osteoporosis, chronic steroid use) AND [2] still hurts    Negative: [1] No prior tetanus shots (or is not fully vaccinated) AND  "[2] any wound (e.g., cut, scrape)    Negative: [1] HIV positive or severe immunodeficiency (severely weak immune system) AND [2] DIRTY cut or scrape    Negative: [1] Last tetanus shot > 5 years ago AND [2] DIRTY cut or scrape    Negative: [1] Last tetanus shot > 10 years ago AND [2] CLEAN cut or scrape (e.g., object AND skin were clean)    Negative: [1] After 3 days AND [2] pain not improving    Negative: [1] After 2 weeks AND [2] still painful    Answer Assessment - Initial Assessment Questions  1. MECHANISM: \"How did the injury happen?\"      Banged hand on counter  2. ONSET: \"When did the injury happen?\" (Minutes or hours ago)       A week ago, then did it again a few days ago  3. APPEARANCE of INJURY: \"What does the injury look like?\"       Finger is bruised  4. SEVERITY: \"Can you use the hand normally?\" \"Can you bend your fingers into a ball and then fully open them?\"      yes  5. SIZE: For cuts, bruises, or swelling, ask: \"How large is it?\" (e.g., inches or centimeters;  entire hand or wrist)       No cuts  6. PAIN: \"Is there pain?\" If so, ask: \"How bad is the pain?\"  (Scale 1-10; or mild, moderate, severe)      mild  7. TETANUS: For any breaks in the skin, ask: \"When was the last tetanus booster?\"      n/a  8. OTHER SYMPTOMS: \"Do you have any other symptoms?\"       Was numb when it was more swollen  9. PREGNANCY: \"Is there any chance you are pregnant?\" \"When was your last menstrual period?\"      no    Protocols used: HAND AND WRIST INJURY-A-AH      "

## 2021-09-19 ENCOUNTER — HEALTH MAINTENANCE LETTER (OUTPATIENT)
Age: 26
End: 2021-09-19

## 2022-01-09 ENCOUNTER — HEALTH MAINTENANCE LETTER (OUTPATIENT)
Age: 27
End: 2022-01-09

## 2022-02-23 ENCOUNTER — LAB REQUISITION (OUTPATIENT)
Dept: LAB | Facility: CLINIC | Age: 27
End: 2022-02-23

## 2022-02-23 PROCEDURE — 86706 HEP B SURFACE ANTIBODY: CPT

## 2022-02-23 PROCEDURE — 86481 TB AG RESPONSE T-CELL SUSP: CPT

## 2022-02-24 LAB
GAMMA INTERFERON BACKGROUND BLD IA-ACNC: 0.12 IU/ML
HBV SURFACE AB SERPL IA-ACNC: 0 M[IU]/ML
M TB IFN-G BLD-IMP: NEGATIVE
M TB IFN-G CD4+ BCKGRND COR BLD-ACNC: 9.88 IU/ML
MITOGEN IGNF BCKGRD COR BLD-ACNC: 0 IU/ML
MITOGEN IGNF BCKGRD COR BLD-ACNC: 0.03 IU/ML
QUANTIFERON MITOGEN: 10 IU/ML
QUANTIFERON NIL TUBE: 0.12 IU/ML
QUANTIFERON TB1 TUBE: 0.12 IU/ML
QUANTIFERON TB2 TUBE: 0.15

## 2022-05-30 ENCOUNTER — APPOINTMENT (OUTPATIENT)
Dept: CT IMAGING | Facility: CLINIC | Age: 27
End: 2022-05-30
Attending: EMERGENCY MEDICINE
Payer: COMMERCIAL

## 2022-05-30 ENCOUNTER — HOSPITAL ENCOUNTER (EMERGENCY)
Facility: CLINIC | Age: 27
Discharge: HOME | End: 2022-05-30
Attending: EMERGENCY MEDICINE | Admitting: EMERGENCY MEDICINE
Payer: COMMERCIAL

## 2022-05-30 VITALS
SYSTOLIC BLOOD PRESSURE: 118 MMHG | WEIGHT: 215 LBS | HEART RATE: 97 BPM | HEIGHT: 68 IN | DIASTOLIC BLOOD PRESSURE: 72 MMHG | BODY MASS INDEX: 32.58 KG/M2 | TEMPERATURE: 100 F | RESPIRATION RATE: 24 BRPM | OXYGEN SATURATION: 91 %

## 2022-05-30 DIAGNOSIS — R10.12 LUQ ABDOMINAL PAIN: ICD-10-CM

## 2022-05-30 DIAGNOSIS — M54.50 ACUTE BILATERAL LOW BACK PAIN WITHOUT SCIATICA: ICD-10-CM

## 2022-05-30 LAB
ALBUMIN UR-MCNC: 20 MG/DL
ANION GAP SERPL CALCULATED.3IONS-SCNC: 8 MMOL/L (ref 3–14)
APPEARANCE UR: ABNORMAL
BACTERIA #/AREA URNS HPF: ABNORMAL /HPF
BASOPHILS # BLD AUTO: 0 10E3/UL (ref 0–0.2)
BASOPHILS NFR BLD AUTO: 0 %
BILIRUB UR QL STRIP: NEGATIVE
BUN SERPL-MCNC: 6 MG/DL (ref 7–30)
CALCIUM SERPL-MCNC: 8.7 MG/DL (ref 8.5–10.1)
CHLORIDE BLD-SCNC: 103 MMOL/L (ref 94–109)
CO2 SERPL-SCNC: 25 MMOL/L (ref 20–32)
COLOR UR AUTO: YELLOW
CREAT SERPL-MCNC: 0.86 MG/DL (ref 0.52–1.04)
EOSINOPHIL # BLD AUTO: 0 10E3/UL (ref 0–0.7)
EOSINOPHIL NFR BLD AUTO: 0 %
ERYTHROCYTE [DISTWIDTH] IN BLOOD BY AUTOMATED COUNT: 13.6 % (ref 10–15)
GFR SERPL CREATININE-BSD FRML MDRD: >90 ML/MIN/1.73M2
GLUCOSE BLD-MCNC: 98 MG/DL (ref 70–99)
GLUCOSE UR STRIP-MCNC: NEGATIVE MG/DL
HCG SER QL IA.RAPID: NEGATIVE
HCT VFR BLD AUTO: 36.8 % (ref 35–47)
HGB BLD-MCNC: 11.8 G/DL (ref 11.7–15.7)
HGB UR QL STRIP: NEGATIVE
HOLD SPECIMEN: NORMAL
HOLD SPECIMEN: NORMAL
HYALINE CASTS: 1 /LPF
IMM GRANULOCYTES # BLD: 0 10E3/UL
IMM GRANULOCYTES NFR BLD: 0 %
KETONES UR STRIP-MCNC: 40 MG/DL
LEUKOCYTE ESTERASE UR QL STRIP: NEGATIVE
LYMPHOCYTES # BLD AUTO: 0.3 10E3/UL (ref 0.8–5.3)
LYMPHOCYTES NFR BLD AUTO: 5 %
MCH RBC QN AUTO: 27 PG (ref 26.5–33)
MCHC RBC AUTO-ENTMCNC: 32.1 G/DL (ref 31.5–36.5)
MCV RBC AUTO: 84 FL (ref 78–100)
MONOCYTES # BLD AUTO: 0.7 10E3/UL (ref 0–1.3)
MONOCYTES NFR BLD AUTO: 10 %
MUCOUS THREADS #/AREA URNS LPF: PRESENT /LPF
NEUTROPHILS # BLD AUTO: 5.9 10E3/UL (ref 1.6–8.3)
NEUTROPHILS NFR BLD AUTO: 85 %
NITRATE UR QL: NEGATIVE
NRBC # BLD AUTO: 0 10E3/UL
NRBC BLD AUTO-RTO: 0 /100
PH UR STRIP: 8 [PH] (ref 5–7)
PLATELET # BLD AUTO: 242 10E3/UL (ref 150–450)
POTASSIUM BLD-SCNC: 3.4 MMOL/L (ref 3.4–5.3)
RBC # BLD AUTO: 4.37 10E6/UL (ref 3.8–5.2)
RBC URINE: 1 /HPF
SODIUM SERPL-SCNC: 136 MMOL/L (ref 133–144)
SP GR UR STRIP: 1.03 (ref 1–1.03)
SQUAMOUS EPITHELIAL: 10 /HPF
UROBILINOGEN UR STRIP-MCNC: NORMAL MG/DL
WBC # BLD AUTO: 7 10E3/UL (ref 4–11)
WBC URINE: 4 /HPF

## 2022-05-30 PROCEDURE — 74176 CT ABD & PELVIS W/O CONTRAST: CPT

## 2022-05-30 PROCEDURE — 82374 ASSAY BLOOD CARBON DIOXIDE: CPT | Performed by: EMERGENCY MEDICINE

## 2022-05-30 PROCEDURE — 81001 URINALYSIS AUTO W/SCOPE: CPT | Performed by: EMERGENCY MEDICINE

## 2022-05-30 PROCEDURE — 96374 THER/PROPH/DIAG INJ IV PUSH: CPT

## 2022-05-30 PROCEDURE — 99285 EMERGENCY DEPT VISIT HI MDM: CPT | Mod: 25

## 2022-05-30 PROCEDURE — 85025 COMPLETE CBC W/AUTO DIFF WBC: CPT | Performed by: EMERGENCY MEDICINE

## 2022-05-30 PROCEDURE — 82310 ASSAY OF CALCIUM: CPT | Performed by: EMERGENCY MEDICINE

## 2022-05-30 PROCEDURE — 96375 TX/PRO/DX INJ NEW DRUG ADDON: CPT

## 2022-05-30 PROCEDURE — 36415 COLL VENOUS BLD VENIPUNCTURE: CPT | Performed by: EMERGENCY MEDICINE

## 2022-05-30 PROCEDURE — 250N000011 HC RX IP 250 OP 636: Performed by: EMERGENCY MEDICINE

## 2022-05-30 PROCEDURE — 84702 CHORIONIC GONADOTROPIN TEST: CPT

## 2022-05-30 RX ORDER — MORPHINE SULFATE 4 MG/ML
4 INJECTION, SOLUTION INTRAMUSCULAR; INTRAVENOUS ONCE
Status: COMPLETED | OUTPATIENT
Start: 2022-05-30 | End: 2022-05-30

## 2022-05-30 RX ORDER — KETOROLAC TROMETHAMINE 15 MG/ML
15 INJECTION, SOLUTION INTRAMUSCULAR; INTRAVENOUS ONCE
Status: COMPLETED | OUTPATIENT
Start: 2022-05-30 | End: 2022-05-30

## 2022-05-30 RX ORDER — ONDANSETRON 2 MG/ML
4 INJECTION INTRAMUSCULAR; INTRAVENOUS
Status: COMPLETED | OUTPATIENT
Start: 2022-05-30 | End: 2022-05-30

## 2022-05-30 RX ADMIN — KETOROLAC TROMETHAMINE 15 MG: 15 INJECTION, SOLUTION INTRAMUSCULAR; INTRAVENOUS at 19:44

## 2022-05-30 RX ADMIN — MORPHINE SULFATE 4 MG: 4 INJECTION INTRAVENOUS at 19:43

## 2022-05-30 RX ADMIN — ONDANSETRON 4 MG: 2 INJECTION INTRAMUSCULAR; INTRAVENOUS at 19:44

## 2022-05-30 ASSESSMENT — ENCOUNTER SYMPTOMS
DYSURIA: 0
HEMATURIA: 0
FEVER: 1
NAUSEA: 1
BACK PAIN: 1
FLANK PAIN: 1
VOMITING: 1
ABDOMINAL PAIN: 1
FREQUENCY: 0

## 2022-05-30 NOTE — ED TRIAGE NOTES
Back pain for two weeks now. This am woke with the pain. Pain is also in belly and chest. Vomiting, diarrhea and fever that started today. Pt is hyperventilating. Mother has a history of kidney stones. Pt believes she may also have this issue.

## 2022-05-30 NOTE — LETTER
May 30, 2022      To Whom It May Concern:      Anna Qiu was seen in our Emergency Department today, 05/30/22.  I expect her condition to improve over the next 1-3 days.  She may return to work/school when improved.    Sincerely,  Adrien Hernandez RN

## 2022-05-31 NOTE — ED PROVIDER NOTES
"  History   Chief Complaint:  Abdominal Pain     HPI   Anna Qiu is a 26 year old female who presents for evaluation of abdominal pain. About two weeks ago the patient started to develop bilateral flank and low back pain. Today her pain worsened significantly and started radiating into her abdomen, and this morning she developed nausea, vomiting, and a fever. Due to this worsening pain she came into the ED for evaluation. Her pain is worse when laying on her back. She notes that her mother has a history of kidney stones but she denies any personal history of these. She notes that her last normal bowel movement was two days ago. She denies any recent dysuria, hematuria, urinary frequency, or vaginal bleeding. She has not had any recent falls or trauma to the area.     Review of Systems   Constitutional: Positive for fever.   Gastrointestinal: Positive for abdominal pain, nausea and vomiting.   Genitourinary: Positive for flank pain. Negative for dysuria, frequency, hematuria and vaginal bleeding.   Musculoskeletal: Positive for back pain.   All other systems reviewed and are negative.      Allergies:  Penicillins     Medications:  Tylenol  Ibuprofen   Ortho evra     Past Medical History:     The patient denies any past medical history.     Family History:    Kidney stones - Mother     Social History:  The patient presents to the ED accompanied by a family member.     Physical Exam     Patient Vitals for the past 24 hrs:   BP Temp Temp src Pulse Resp SpO2 Height Weight   05/30/22 2000 118/72 -- -- 97 -- 91 % -- --   05/30/22 1955 123/69 -- -- 93 24 95 % -- --   05/30/22 1834 127/72 100  F (37.8  C) Temporal 102 16 100 % 1.727 m (5' 8\") 97.5 kg (215 lb)       Physical Exam  General: The patient is alert, in no respiratory distress.    HENT: Mucous membranes moist.    Cardiovascular: Regular rate and rhythm. Good pulses in all four extremities. Normal capillary refill and skin turgor.     Respiratory: Lungs are " clear. No nasal flaring. No retractions. No wheezing, no crackles.    Gastrointestinal: Abdomen soft. No guarding, no rebound. No palpable hernias. Discomfort to palpation of the abdomen, left upper quadrant.     Musculoskeletal: No gross deformity. Lumbar tenderness.     Skin: No rashes or petechiae.     Neurologic: The patient is alert and oriented x3. GCS 15. No testable cranial nerve deficit. Follows commands with clear and appropriate speech. Gives appropriate answers. Good strength in all extremities. No gross neurologic deficit. Gross sensation intact. Pupils are round and reactive. No meningismus.     Lymphatic: No cervical adenopathy. No lower extremity swelling.    Psychiatric: Tearful. Agitated.     Emergency Department Course     Imaging:  CT Abdomen Pelvis w/o Contrast   Final Result   IMPRESSION:    1.  No urinary calculi or hydronephrosis.      2.  Normal appendix.      3.  Solid nodule dome of the liver shown previously to represent a benign cavernous hemangioma.         Report per radiology    Laboratory:  Labs Ordered and Resulted from Time of ED Arrival to Time of ED Departure   BASIC METABOLIC PANEL - Abnormal       Result Value    Sodium 136      Potassium 3.4      Chloride 103      Carbon Dioxide (CO2) 25      Anion Gap 8      Urea Nitrogen 6 (*)     Creatinine 0.86      Calcium 8.7      Glucose 98      GFR Estimate >90     CBC WITH PLATELETS AND DIFFERENTIAL - Abnormal    WBC Count 7.0      RBC Count 4.37      Hemoglobin 11.8      Hematocrit 36.8      MCV 84      MCH 27.0      MCHC 32.1      RDW 13.6      Platelet Count 242      % Neutrophils 85      % Lymphocytes 5      % Monocytes 10      % Eosinophils 0      % Basophils 0      % Immature Granulocytes 0      NRBCs per 100 WBC 0      Absolute Neutrophils 5.9      Absolute Lymphocytes 0.3 (*)     Absolute Monocytes 0.7      Absolute Eosinophils 0.0      Absolute Basophils 0.0      Absolute Immature Granulocytes 0.0      Absolute NRBCs 0.0      ROUTINE UA WITH MICROSCOPIC REFLEX TO CULTURE - Abnormal    Color Urine Yellow      Appearance Urine Slightly Cloudy (*)     Glucose Urine Negative      Bilirubin Urine Negative      Ketones Urine 40  (*)     Specific Gravity Urine 1.028      Blood Urine Negative      pH Urine 8.0 (*)     Protein Albumin Urine 20  (*)     Urobilinogen Urine Normal      Nitrite Urine Negative      Leukocyte Esterase Urine Negative      Bacteria Urine Few (*)     Mucus Urine Present (*)     RBC Urine 1      WBC Urine 4      Squamous Epithelials Urine 10 (*)     Hyaline Casts Urine 1     ISTAT HCG QUALITATIVE PREGNANCY POCT - Normal    HCG Qualitative POCT Negative         Emergency Department Course:     Reviewed:  I reviewed nursing notes, vitals and past medical history    Assessments:  1925:  I obtained history and examined the patient as noted above.     2055: On recheck the patient appeared more comfortable.     2135: I updated and reassessed the patient.     Interventions:  1943 Morphine 4 mg IV   1944 Zofran 4 mg IV   1944 Toradol 15 mg IV     Disposition:  The patient was discharged to home.     Impression & Plan         Medical Decision Making:  The patient presented quite uncomfortable tearful but writhing on the bed.  With her family history of kidney stones I did consider that as a potential cause.  The patient is not pregnant therefore this is not an ectopic pregnancy.  Consideration was given to ovarian torsion or mesenteric ischemia however her pain is quite high and her abdominal exam is quite benign.  I did treat with Toradol and a dose of morphine concerns could be a kidney stone her CT scan came back reassuring.  Her urine did not show signs of UTI or Hood.  The patient was comfortable with walking after that.  I discussed that the exact cause of her pain is unknown it could be something such as a passed kidney stone or other pelvic causes she denies symptoms of PID.  The patient will follow-up as an outpatient  she is quite comfortable and is aware that the exact cause of the pain is is not known.     Diagnosis:    ICD-10-CM    1. Acute bilateral low back pain without sciatica  M54.50    2. LUQ abdominal pain  R10.12        Discharge Medications:  New Prescriptions    No medications on file       Scribe Disclosure:  I, Joaquin Pierce, am serving as a scribe at 7:10 PM on 5/30/2022 to document services personally performed by Eh Silva MD based on my observations and the provider's statements to me.           Eh Silva MD  05/30/22 9036

## 2022-11-21 ENCOUNTER — HEALTH MAINTENANCE LETTER (OUTPATIENT)
Age: 27
End: 2022-11-21

## 2022-12-25 ENCOUNTER — HEALTH MAINTENANCE LETTER (OUTPATIENT)
Age: 27
End: 2022-12-25

## 2024-02-04 ENCOUNTER — HEALTH MAINTENANCE LETTER (OUTPATIENT)
Age: 29
End: 2024-02-04

## 2024-04-05 PROBLEM — O09.899 SUSCEPTIBLE TO VARICELLA (NON-IMMUNE), CURRENTLY PREGNANT: Status: ACTIVE | Noted: 2023-12-28

## 2024-04-05 PROBLEM — Z60.9 HIGH RISK SOCIAL SITUATION: Status: ACTIVE | Noted: 2024-01-02

## 2024-04-05 PROBLEM — Z86.19 HISTORY OF SYPHILIS: Status: ACTIVE | Noted: 2023-12-28

## 2024-04-05 PROBLEM — Z28.39 SUSCEPTIBLE TO VARICELLA (NON-IMMUNE), CURRENTLY PREGNANT: Status: ACTIVE | Noted: 2023-12-28

## 2024-04-05 PROBLEM — R07.89 CHEST PAIN, ATYPICAL: Status: ACTIVE | Noted: 2017-03-15

## 2024-04-05 PROBLEM — Z82.41 FAMILY HISTORY OF SUDDEN CARDIAC DEATH (SCD): Status: ACTIVE | Noted: 2017-03-15

## 2024-04-05 PROBLEM — F41.8 ANXIOUS DEPRESSION: Status: ACTIVE | Noted: 2021-09-15

## 2024-04-08 ENCOUNTER — TELEPHONE (OUTPATIENT)
Dept: OBGYN | Facility: CLINIC | Age: 29
End: 2024-04-08
Payer: MEDICAID

## 2024-04-08 DIAGNOSIS — O09.70 SUPERVISION OF HIGH RISK PREGNANCY DUE TO SOCIAL PROBLEMS: Primary | ICD-10-CM

## 2024-04-08 NOTE — TELEPHONE ENCOUNTER
Called patient for [facility] upcoming [facility] appointment. Called [phone] x2 at appointment start time (1:42) but unable to leave voicemail). Reviewed care everywhere and verified patient's phone number with [facility] demographics. A different phone number was listed in patient's [facility] chart, so attempted to call new phone number ([phone]) and left voice message with clinic's call back number. Updated patient's phone number to new phone number.     Patient returned phone call to clinic at 2:05 and was rescheduled to Wednesday.

## 2024-04-08 NOTE — TELEPHONE ENCOUNTER
[facility] Call Center    Phone Message    May a detailed message be left on voicemail: yes     Reason for Call: Other: This pt is scheduled for an US on Thursday, 4/11. Pt is requesting that it be a 3D ultrasound. Please call pt back to discuss.      Action Taken: Other: OBGYN    Travel Screening: Not Applicable

## 2024-04-08 NOTE — TELEPHONE ENCOUNTER
Left voicemail for patient.  US was incorrectly scheduled and not needed.      Referral placed for [facility] for comprehensive US.

## 2024-04-09 ENCOUNTER — PRE VISIT (OUTPATIENT)
Dept: MATERNAL FETAL MEDICINE | Facility: CLINIC | Age: 29
End: 2024-04-09
Payer: MEDICAID

## 2024-04-09 ENCOUNTER — TRANSCRIBE ORDERS (OUTPATIENT)
Dept: MATERNAL FETAL MEDICINE | Facility: CLINIC | Age: 29
End: 2024-04-09
Payer: MEDICAID

## 2024-04-09 DIAGNOSIS — O26.90 PREGNANCY RELATED CONDITION, ANTEPARTUM: Primary | ICD-10-CM

## 2024-04-10 ENCOUNTER — OFFICE VISIT (OUTPATIENT)
Dept: MATERNAL FETAL MEDICINE | Facility: CLINIC | Age: 29
End: 2024-04-10
Attending: OBSTETRICS & GYNECOLOGY

## 2024-04-10 ENCOUNTER — HOSPITAL ENCOUNTER (OUTPATIENT)
Dept: ULTRASOUND IMAGING | Facility: CLINIC | Age: 29
Discharge: HOME | End: 2024-04-10
Attending: OBSTETRICS & GYNECOLOGY

## 2024-04-10 ENCOUNTER — VIRTUAL VISIT (OUTPATIENT)
Dept: OBGYN | Facility: CLINIC | Age: 29
End: 2024-04-10
Attending: OBSTETRICS & GYNECOLOGY
Payer: MEDICAID

## 2024-04-10 DIAGNOSIS — O98.119 SYPHILIS AFFECTING PREGNANCY, ANTEPARTUM: Primary | ICD-10-CM

## 2024-04-10 DIAGNOSIS — O26.90 PREGNANCY RELATED CONDITION, ANTEPARTUM: ICD-10-CM

## 2024-04-10 DIAGNOSIS — Z32.01 PREGNANCY TEST POSITIVE: Primary | ICD-10-CM

## 2024-04-10 PROCEDURE — 76811 OB US DETAILED SNGL FETUS: CPT | Mod: 26 | Performed by: OBSTETRICS & GYNECOLOGY

## 2024-04-10 PROCEDURE — 76811 OB US DETAILED SNGL FETUS: CPT

## 2024-04-10 PROCEDURE — 99203 OFFICE O/P NEW LOW 30 MIN: CPT | Mod: 25 | Performed by: OBSTETRICS & GYNECOLOGY

## 2024-04-10 RX ORDER — PROCHLORPERAZINE 25 MG
25 SUPPOSITORY, RECTAL RECTAL EVERY 12 HOURS PRN
COMMUNITY
Start: 2023-12-26 | End: 2024-04-10

## 2024-04-10 RX ORDER — ONDANSETRON 4 MG/1
1 TABLET, ORALLY DISINTEGRATING ORAL EVERY 8 HOURS PRN
COMMUNITY
Start: 2024-02-29 | End: 2024-05-17

## 2024-04-10 RX ORDER — CALCIUM CARBONATE 300MG(750)
TABLET,CHEWABLE ORAL
COMMUNITY
End: 2024-04-10

## 2024-04-10 RX ORDER — BERBERINE CHLOR/SEAWEED/CHROM 500-250 MG
CAPSULE ORAL
COMMUNITY
End: 2024-05-17

## 2024-04-10 RX ORDER — PRENATAL VIT/IRON FUM/FOLIC AC 27MG-0.8MG
1 TABLET ORAL DAILY
Qty: 90 TABLET | Refills: 3 | Status: SHIPPED | OUTPATIENT
Start: 2024-04-10 | End: 2024-08-02

## 2024-04-10 NOTE — PROGRESS NOTES
SAMANTHA RN Transfer of Care Intake note    28 year old female 23w4d pregnant.  LMP: 10/28/23.    exact, regular cycles  Pregnancy is planned.    Patient is transferring care from: [facility] and has had 2         prenatal visits with this clinic. Gestational age at first OB visit with this pregnancy was 9w    The reason the patient is transferring care is: pt felt that communication with clinic was impaired.    Partner/support person name and relationship - whole family, did not give names.        Symptoms since LMP include nausea, vomiting, breast tenderness, and fatigue. Patient has tried these relief   measures: small frequent meals, increased rest, and increased fluids.      OB HISTORY  OB History    Para Term  AB Living   2 0 0 0 1 0   SAB IAB Ectopic Multiple Live Births   0 1 0 0 0      # Outcome Date GA Lbr Maikel/2nd Weight Sex Type Anes PTL Lv   2 Current            1 IAB 2019 5w0d             Birth Comments: Medical        OB COMPLICATIONS  NONE      PERSONAL/SOCIAL HISTORY  partnered  lives with their family - mom.  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  Job involves moderate activity .    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   Started on lexapro 10 mg daily on 2021.  Formatting of this note might be different from the original.   Formatting of this note might be different from the original.   Started on lexapro 10  "mg daily on 7/2/2021.      Atopic dermatitis 03/08/2016    Chest pain, atypical 03/15/2017    Family history of sudden cardiac death (SCD) 03/15/2017    High risk social situation 01/02/2024    Formatting of this note might be different from the original.   This patient is enrolled in the [facility] Program. The program provides patients with support, education, referrals and resources during their pregnancy.       Reason for enrollment: Resources for transportation, food, housing, and \"other\"      For more information, please contact Julio Donovan, [facility] Spec    History of syphilis 12/28/2023    Formatting of this note might be different from the original.   Diagnosed 12/07/2021 and treated.   12/28/2023 reactive positive RPR, confirmation results pending. Patient advised by letter.   Consult w/ID advised verification of treatment w/[place] [facility]. No call back.   Patient needs Consult w/ID and possible repeat of PCN treatment.         - Pre pregnancy weight 205lbs    - Pt  does not have a recent known exposure to Parvo or CMV so IgG/IgM testing WILL NOT be ordered.   - Labs already drawn this pregnancy include: see lab tab  - Ultrasound done at 8w2d weeks gestation on 12/25/23  Flu Vaccine.  Patient already received Flu Vaccine  COVID Vaccine: received initial vaccine series  RHogam: B+  Tdap: too early  - Discussed expectations for routine prenatal care and scheduling.  - Discussed highlights from The Expectant Family booklet on warning signs, safe pregnancy and prevention of infections diseases, nutrition and exercise.  - Patient was encouraged to start prenatal vitamins as tolerated, if experiencing nausea and vomiting then OK to switch to folic acid only at this time.    - Additional items: Has been given information regarding WIC  Was given the phone number for [facility]  - Reconciled and reviewed problem list  - Pt scheduled for NOB on 4/11    Gayle Loya RN         "

## 2024-04-10 NOTE — LETTER
4/10/2024       RE: Anna Qiu  [id number]  [place] [address]     Dear Colleague,    Thank you for referring your patient, Anna Qiu, to the [facility] [place] [facility]. Please see a copy of my visit note below.    SAMANTHA RN Transfer of Care Intake note    28 year old female 23w4d pregnant.  LMP: 10/28/23.    exact, regular cycles  Pregnancy is planned.    Patient is transferring care from: [facility] and has had 2         prenatal visits with this clinic. Gestational age at first OB visit with this pregnancy was 9w    The reason the patient is transferring care is: pt felt that communication with clinic was impaired.    Partner/support person name and relationship - whole family, did not give names.        Symptoms since LMP include nausea, vomiting, breast tenderness, and fatigue. Patient has tried these relief   measures: small frequent meals, increased rest, and increased fluids.      OB HISTORY  OB History    Para Term  AB Living   2 0 0 0 1 0   SAB IAB Ectopic Multiple Live Births   0 1 0 0 0      # Outcome Date GA Lbr Maikel/2nd Weight Sex Type Anes PTL Lv   2 Current            1 IAB 2019 5w0d             Birth Comments: Medical        OB COMPLICATIONS  NONE      PERSONAL/SOCIAL HISTORY  partnered  lives with their family - mom.  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   Started on lexapro 10 mg daily on 7/2/2021.  Formatting of this note might be different from the original.   Formatting of this note might be different from the original.   Started on lexapro 10 mg daily on 7/2/2021.      Atopic dermatitis 03/08/2016    Chest pain, atypical 03/15/2017    Family history of sudden cardiac death (SCD) 03/15/2017    High risk social situation 01/02/2024    Formatting of this note might be different from the original.   This patient is enrolled in the [employer] [facility] Program. The program provides patients with support, education, referrals and resources during their pregnancy.       Reason for enrollment: Resources for transportation, food, housing, and \"other\"      For more information, please contact Julio Donovan, [facility] Spec    History of syphilis 12/28/2023    Formatting of this note might be different from the original.   Diagnosed 12/07/2021 and treated.   12/28/2023 reactive positive RPR, confirmation results pending. Patient advised by letter.   Consult w/ID advised verification of treatment w/[place] [facility]. No call back.   Patient needs Consult w/ID and possible repeat of PCN treatment.         - Pre pregnancy weight 205lbs    - Pt  does not have a recent known exposure to Parvo or CMV so IgG/IgM testing WILL NOT be ordered.   - Labs already drawn this pregnancy include: see lab tab  - Ultrasound done at 8w2d weeks gestation on 12/25/23  Flu Vaccine.  Patient already received Flu Vaccine  COVID Vaccine: received initial vaccine series  RHogam: B+  Tdap: too early  - Discussed expectations for routine prenatal care and scheduling.  - Discussed highlights from The Expectant Family booklet on warning signs, safe pregnancy and prevention of infections diseases, nutrition and exercise.  - Patient was encouraged to start " prenatal vitamins as tolerated, if experiencing nausea and vomiting then OK to switch to folic acid only at this time.    - Additional items: Has been given information regarding WIC  Was given the phone number for [facility]  - Reconciled and reviewed problem list  - Pt scheduled for NOB on 4/11    Gayle Loya RN

## 2024-04-10 NOTE — PROGRESS NOTES
"Please see \"Imaging\" tab under \"Chart Review\" for details of today's visit.    Reinaldo Maldonado    " MONITOR: Patient has narrow angle(s) but these are not deemed to be closeable at this time. However, angle closure is not always predictable and the signs and symptoms of this were discussed. The patient was asked to follow up with repeat gonioscopy as scheduled.

## 2024-04-10 NOTE — PATIENT INSTRUCTIONS
WIC is a nutrition and breastfeeding program that helps young families eat well and be healthy by assisting with obtaining food and teaching about nutritious foods during/after pregnancy.  Here's where you can find more information about WIC and apply if interested: [email or web address].[place]/people/wic/ppthome.html    [employer] provides education during all three trimesters of pregnancy, reviewing the stages of prenatal development so parents know what to expect and feel empowered in their journey. They also continue support after birth, with [employer] modeling techniques for playing and learning. Here's the link: https://cVidya/enroll/     Thank you for trusting us with your care!     If you need to contact us for questions about:  Symptoms, Scheduling & Medical Questions; Non-urgent (2-3 day response) [employer] message, Urgent (needing response today) [phone] (if after 3:30pm next day response)   Prescriptions: Please call your Pharmacy   Billing: [facility] [phone] or [employer] [facility]:[phone]    Weeks 22 to 26 of Your Pregnancy: Care Instructions  Your baby's lungs are getting ready for breathing. Your baby may respond to your voice. Your baby likely turns less, and kicks or jerks more. Jerking may mean that your baby has hiccups.    Think about taking childbirth classes. And start to think about whether you want to have pain medicine during labor.    At your next doctor visit, you may be tested for anemia and for high blood sugar that first occurs during pregnancy (gestational diabetes). These conditions can cause problems for you and your baby.    To ease discomfort, such as back pain    Change your position often. Try not to sit or stand for too long.  Get some exercise. Things like walking or stretching may help.  Try using a heating pad or cold pack.    To ease or reduce swelling in your feet, ankles, hands, and fingers    Take off your rings.  Avoid high-sodium foods, such as  "potato chips.  Prop up your feet, and sleep with pillows under your feet.  Try to avoid standing for long periods of time.  Do not wear tight shoes.  Wear support stockings.  Kegel exercises to prevent urine from leaking    Squeeze your muscles as if you were trying not to pass gas. Your belly, legs, and buttocks shouldn't move. Hold the squeeze for 3 seconds, then relax for 5 to 10 seconds.    Add 1 second each week until you can squeeze for 10 seconds. Repeat the exercise 10 times a session. Do 3 to 8 sessions a day. If these exercises cause you pain, stop doing them and talk with your doctor.  Follow-up care is a key part of your treatment and safety. Be sure to make and go to all appointments, and call your doctor if you are having problems. It's also a good idea to know your test results and keep a list of the medicines you take.  Where can you learn more?  Go to https://www.[employer].net/patiented  Enter G264 in the search box to learn more about \"Weeks 22 to 26 of Your Pregnancy: Care Instructions.\"  Current as of: July 10, 2023               Content Version: 14.0    7122-6800 [employer].   Care instructions adapted under license by your healthcare professional. If you have questions about a medical condition or this instruction, always ask your healthcare professional. [employer] disclaims any warranty or liability for your use of this information.      Learning About Screening for Gestational Diabetes  What is gestational diabetes screening?     Screening for gestational diabetes is a way to look for high blood sugar during pregnancy. You drink some very sweet liquid. Then you have a blood test to see how your body uses sugar (glucose).  How is gestational diabetes screening done?  Screening for gestational diabetes may be done in a couple of ways.  Two-part screening.  Part one (glucose challenge test): A blood sample is taken after you drink a liquid that contains sugar " "(glucose). You don't need to stop eating or drinking before this test. If the test shows that you don't have a lot of sugar in your blood, you don't have gestational diabetes.  Part two (oral glucose tolerance test, or OGTT): If the first test shows a lot of sugar in your blood, then you may have an OGTT. You can't eat or drink for at least 8 hours before this test. A blood sample is taken, then you drink a sweet liquid. You have more blood tests after 1 to 3 hours. If the OGTT shows that you have a lot of sugar in your blood, you may have gestational diabetes.  One-part screening.  Sometimes doctors use the OGTT on its own. If the test shows that you don't have a lot of sugar in your blood, you don't have gestational diabetes. If you do have a lot of sugar in your blood, you may have the condition.  What are the risks of screening?  Your blood glucose level may drop very low toward the end of the test. If this happens, you may feel weak, hungry, and restless. Tell your doctor if you have these symptoms. The test usually will be stopped.  You may vomit after drinking the sweet liquid. If this happens, you may need to take the test at a later time.  Your doctor may do more glucose tests at other times during your pregnancy.  Follow-up care is a key part of your treatment and safety. Be sure to make and go to all appointments, and call your doctor if you are having problems. It's also a good idea to know your test results and keep a list of the medicines you take.  Where can you learn more?  Go to https://www.healthZollo.net/patiented  Enter A472 in the search box to learn more about \"Learning About Screening for Gestational Diabetes.\"  Current as of: October 2, 2023               Content Version: 14.0    3856-3153 [employer].   Care instructions adapted under license by your healthcare professional. If you have questions about a medical condition or this instruction, always ask your healthcare " professional. [employer] disclaims any warranty or liability for your use of this information.      You have been provided the My Labor and Birth Wishes document.  Please review at home and bring to your next prenatal visit. Bring this sheet to the hospital for your birth. Give copies to your care team members and support person.   Additional copies can be found here:  [email or web address].pdf  Weeks 26 to 30 of Your Pregnancy: Care Instructions  You're starting your last trimester. You'll probably feel your baby moving around more. Your back may ache as your body gets used to your baby's size and length. Take care of yourself, and pay attention to what your body needs.    Talk to your doctor about getting the Tdap shot. It will help protect your  against whooping cough (pertussis). Also ask your doctor about flu and COVID-19 shots if you haven't had them yet. If your blood type is Rh negative, you may be given a shot of Rh immune globulin (such as RhoGAM). It can help prevent problems for your baby.    You may have [place] contractions. They are single or several strong contractions without a pattern. These are practice contractions but not the start of labor.  Be kind to yourself.     Take breaks when you're tired.  Change positions often. Don't sit for too long or stand for too long.  At work, rest during breaks if you can. If you don't get breaks, talk to your doctor about writing a letter to your employer to request them.  Avoid fumes, chemicals, and tobacco smoke.  Be sexual if you want to.     You may be interested in sex, or you may not. Everyone is different.  Sex is okay unless your doctor tells you not to.  Your belly can make it hard to find good positions for sex. Speed and explore.  Watch for signs of  labor.    These signs include:   Menstrual-like cramps. Or you may have pain or pressure in your pelvis that happens in a pattern.  About 6 or more contractions  "in an hour (even after rest and a glass of water).  A low, dull backache that doesn't go away when you change positions.  An increase or change in vaginal discharge.  Light vaginal bleeding or spotting.  Your water breaking.  Know what to do if you think you are having contractions.     Drink 1 or 2 glasses of water.  Lie down on your left side for at least an hour.  While on your side, feel the top of your belly to see if it's tight.  Write down your contractions for an hour. Time how long it is from the start of one contraction to the start of the next.  Call your doctor if you have regular contractions.  Follow-up care is a key part of your treatment and safety. Be sure to make and go to all appointments, and call your doctor if you are having problems. It's also a good idea to know your test results and keep a list of the medicines you take.  Where can you learn more?  Go to https://www.[employer].net/patiented  Enter S999 in the search box to learn more about \"Weeks 26 to 30 of Your Pregnancy: Care Instructions.\"  Current as of: July 10, 2023               Content Version: 14.0    0485-1715 [employer].   Care instructions adapted under license by your healthcare professional. If you have questions about a medical condition or this instruction, always ask your healthcare professional. [employer] disclaims any warranty or liability for your use of this information.      Counting Your Baby's Kicks: Care Instructions  Overview     Counting your baby's kicks is one way your doctor can tell that your baby is healthy. You will probably feel your baby move for the first time between 16 and 22 weeks. The movement may feel like flutters rather than kicks. Your baby may move more at certain times of the day. When you are active, you may notice less kicking than when you are resting. At your prenatal visits, your doctor will ask whether the baby is active.  In your last trimester, your doctor " "may ask you to count the number of times you feel your baby move.  Follow-up care is a key part of your treatment and safety. Be sure to make and go to all appointments, and call your doctor if you are having problems. It's also a good idea to know your test results and keep a list of the medicines you take.  How do you count fetal kicks?  A common method of checking your baby's movement is to note the length of time it takes to count 10 movements (such as kicks, flutters, or rolls).  Pick your baby's most active time of day to count. This may be any time from morning to evening.  If you don't feel 10 movements in an hour, have something to eat or drink and count for another hour. If you don't feel at least 10 movements in the 2-hour period, call your doctor.  Do not use an at-home Doppler heart monitor in place of counting fetal movements.  When should you call for help?   Call your doctor now or seek immediate medical care if:    You feel fewer than 10 movements in a 2-hour period.     You noticed that your baby has stopped moving or is moving less than normal.   Watch closely for changes in your health, and be sure to contact your doctor if you have any problems.  Where can you learn more?  Go to https://www.[employer].net/patiented  Enter U048 in the search box to learn more about \"Counting Your Baby's Kicks: Care Instructions.\"  Current as of: July 10, 2023               Content Version: 14.0    0310-0318 [employer].   Care instructions adapted under license by your healthcare professional. If you have questions about a medical condition or this instruction, always ask your healthcare professional. [employer] disclaims any warranty or liability for your use of this information.        "

## 2024-04-10 NOTE — NURSING NOTE
Patient reports positive fetal movement, no pain, no contractions, leaking of fluid, or bleeding.    Patient denies headache, visual changes, nausea/vomiting, epigastric pain related to preeclampsia.  Education provided to patient on today's ultrasound.  SBAR given to GINGER MCKEON, see their note in Epic.

## 2024-04-12 ENCOUNTER — TELEPHONE (OUTPATIENT)
Dept: OBGYN | Facility: CLINIC | Age: 29
End: 2024-04-12
Payer: MEDICAID

## 2024-04-12 NOTE — TELEPHONE ENCOUNTER
----- Message from Cristal Dasilva RN sent at 4/12/2024  9:16 AM CDT -----  Regarding: FW: reschedule  Could you reach out to patient and work to reschedule first provider visit that was missed yesterday? She does not need an US prior to this appt.    Thank you!  ----- Message -----  From: Mary Chicas CNM  Sent: 4/11/2024   4:04 PM CDT  To: [facility]  Subject: reschedule                                       Can you see if this patient wants to reschedule? We also could get her scheduled with our infectious disease clinc. She needs to be treated for syphilis still and it doesn't look like she did that at [facility]. There was concern about an allergy to PCN, but it looks like she saw an allergist at [facility] and was able to do an amoxicillin trial.  Thanks,  Mary

## 2024-05-03 ENCOUNTER — TELEPHONE (OUTPATIENT)
Dept: OBGYN | Facility: CLINIC | Age: 29
End: 2024-05-03
Payer: MEDICAID

## 2024-05-03 NOTE — TELEPHONE ENCOUNTER
Called Anna to let her know she does not have to come to an ultrasound prior to her visit on Monday with Javier.  She had one at [facility] on 4/10 and has another scheduled at [facility] on 5/10.  Her appointment has been cancelled.  Also sent her a [employer] message.

## 2024-05-08 ENCOUNTER — HOSPITAL ENCOUNTER (OUTPATIENT)
Facility: CLINIC | Age: 29
Discharge: HOME | End: 2024-05-08
Attending: OBSTETRICS & GYNECOLOGY | Admitting: OBSTETRICS & GYNECOLOGY
Payer: MEDICAID

## 2024-05-08 ENCOUNTER — HOSPITAL ENCOUNTER (OUTPATIENT)
Facility: CLINIC | Age: 29
End: 2024-05-08
Admitting: OBSTETRICS & GYNECOLOGY
Payer: MEDICAID

## 2024-05-08 ENCOUNTER — HOSPITAL ENCOUNTER (EMERGENCY)
Facility: CLINIC | Age: 29
Discharge: HOME | End: 2024-05-08
Attending: EMERGENCY MEDICINE | Admitting: EMERGENCY MEDICINE
Payer: MEDICAID

## 2024-05-08 VITALS — SYSTOLIC BLOOD PRESSURE: 127 MMHG | DIASTOLIC BLOOD PRESSURE: 70 MMHG | RESPIRATION RATE: 18 BRPM | TEMPERATURE: 97.8 F

## 2024-05-08 VITALS
HEART RATE: 91 BPM | TEMPERATURE: 98.4 F | DIASTOLIC BLOOD PRESSURE: 73 MMHG | OXYGEN SATURATION: 100 % | SYSTOLIC BLOOD PRESSURE: 128 MMHG | RESPIRATION RATE: 12 BRPM

## 2024-05-08 DIAGNOSIS — Z3A.27 27 WEEKS GESTATION OF PREGNANCY: ICD-10-CM

## 2024-05-08 DIAGNOSIS — S39.012A STRAIN OF LUMBAR REGION, INITIAL ENCOUNTER: ICD-10-CM

## 2024-05-08 DIAGNOSIS — M25.551 RIGHT HIP PAIN: ICD-10-CM

## 2024-05-08 PROBLEM — Z36.89 ENCOUNTER FOR TRIAGE IN PREGNANT PATIENT: Status: ACTIVE | Noted: 2024-05-08

## 2024-05-08 LAB
ALBUMIN UR-MCNC: NEGATIVE MG/DL
APPEARANCE UR: CLEAR
BILIRUB UR QL STRIP: NEGATIVE
COLOR UR AUTO: ABNORMAL
GLUCOSE UR STRIP-MCNC: NEGATIVE MG/DL
HGB UR QL STRIP: NEGATIVE
KETONES UR STRIP-MCNC: NEGATIVE MG/DL
LEUKOCYTE ESTERASE UR QL STRIP: ABNORMAL
MUCOUS THREADS #/AREA URNS LPF: PRESENT /LPF
NITRATE UR QL: NEGATIVE
PH UR STRIP: 6 [PH] (ref 5–7)
RBC URINE: 1 /HPF
SP GR UR STRIP: 1.02 (ref 1–1.03)
SQUAMOUS EPITHELIAL: 3 /HPF
UROBILINOGEN UR STRIP-MCNC: NORMAL MG/DL
WBC URINE: 8 /HPF

## 2024-05-08 PROCEDURE — 99283 EMERGENCY DEPT VISIT LOW MDM: CPT

## 2024-05-08 PROCEDURE — 81001 URINALYSIS AUTO W/SCOPE: CPT | Performed by: OBSTETRICS & GYNECOLOGY

## 2024-05-08 PROCEDURE — 250N000009 HC RX 250: Performed by: EMERGENCY MEDICINE

## 2024-05-08 PROCEDURE — G0463 HOSPITAL OUTPT CLINIC VISIT: HCPCS

## 2024-05-08 RX ORDER — ONDANSETRON 4 MG/1
4 TABLET, ORALLY DISINTEGRATING ORAL EVERY 6 HOURS PRN
Status: DISCONTINUED | OUTPATIENT
Start: 2024-05-08 | End: 2024-05-08 | Stop reason: HOSPADM

## 2024-05-08 RX ORDER — PROCHLORPERAZINE 25 MG
25 SUPPOSITORY, RECTAL RECTAL EVERY 12 HOURS PRN
Status: DISCONTINUED | OUTPATIENT
Start: 2024-05-08 | End: 2024-05-08 | Stop reason: HOSPADM

## 2024-05-08 RX ORDER — METOCLOPRAMIDE 10 MG/1
10 TABLET ORAL EVERY 6 HOURS PRN
Status: DISCONTINUED | OUTPATIENT
Start: 2024-05-08 | End: 2024-05-08 | Stop reason: HOSPADM

## 2024-05-08 RX ORDER — ONDANSETRON 2 MG/ML
4 INJECTION INTRAMUSCULAR; INTRAVENOUS EVERY 6 HOURS PRN
Status: DISCONTINUED | OUTPATIENT
Start: 2024-05-08 | End: 2024-05-08 | Stop reason: HOSPADM

## 2024-05-08 RX ORDER — DEXAMETHASONE SODIUM PHOSPHATE 10 MG/ML
10 INJECTION, SOLUTION INTRAMUSCULAR; INTRAVENOUS ONCE
Status: COMPLETED | OUTPATIENT
Start: 2024-05-08 | End: 2024-05-08

## 2024-05-08 RX ORDER — PROCHLORPERAZINE MALEATE 5 MG
10 TABLET ORAL EVERY 6 HOURS PRN
Status: DISCONTINUED | OUTPATIENT
Start: 2024-05-08 | End: 2024-05-08 | Stop reason: HOSPADM

## 2024-05-08 RX ORDER — METOCLOPRAMIDE HYDROCHLORIDE 5 MG/ML
10 INJECTION INTRAMUSCULAR; INTRAVENOUS EVERY 6 HOURS PRN
Status: DISCONTINUED | OUTPATIENT
Start: 2024-05-08 | End: 2024-05-08 | Stop reason: HOSPADM

## 2024-05-08 RX ADMIN — DEXAMETHASONE SODIUM PHOSPHATE 10 MG: 10 INJECTION INTRAMUSCULAR; INTRAVENOUS at 02:53

## 2024-05-08 ASSESSMENT — ACTIVITIES OF DAILY LIVING (ADL)
ADLS_ACUITY_SCORE: 35

## 2024-05-08 ASSESSMENT — COLUMBIA-SUICIDE SEVERITY RATING SCALE - C-SSRS
1. IN THE PAST MONTH, HAVE YOU WISHED YOU WERE DEAD OR WISHED YOU COULD GO TO SLEEP AND NOT WAKE UP?: NO
6. HAVE YOU EVER DONE ANYTHING, STARTED TO DO ANYTHING, OR PREPARED TO DO ANYTHING TO END YOUR LIFE?: NO
2. HAVE YOU ACTUALLY HAD ANY THOUGHTS OF KILLING YOURSELF SINCE LAST CONTACT?: NO
2. HAVE YOU ACTUALLY HAD ANY THOUGHTS OF KILLING YOURSELF IN THE PAST MONTH?: NO

## 2024-05-08 NOTE — DISCHARGE INSTRUCTIONS
I recommend gentle massage, gentle stretching, 1000 mg of Tylenol every 6 hours for your pain.  You should be rechecked in clinic.  Should you develop worsening pain, weakness or numbness in your leg, or urinary incontinence you should return here to the emergency department.

## 2024-05-08 NOTE — LETTER
[facility] EMERGENCY DEPT  [address] [address]  [phone]      May 8, 2024    Anna Qiu  [address] [address] [address]  [phone] (home)     : 1995      To Whom it may concern:    Anna Qiu was seen in our Emergency Department today, May 8, 2024, for evaluation. Please excuse her from work through 5/10/24 to recover. Upon her return, she should not lift more than 10-15 pounds until after she delivers her baby.     Sincerely,    ________________  Celestino Brown MD

## 2024-05-08 NOTE — PROVIDER NOTIFICATION
05/08/24 0115   Provider Notification   Provider Name/Title Dr Mina   Method of Notification At Bedside   Request Evaluate in Person   Notification Reason Patient Arrived;Uterine Activity;Pain;Lab/Diagnostic Study     Patient came to [facility] for OB clearance. Patient complained of right hip and lower back pain. She stated she hurt her back while lifting boxes at work.   Provider at bedside to assess patient. FHT within normal limits, no contractions or abdominal pain noted. Denies contractions and contraction pain, feels baby's active movement. Obstetrically cleared by Dr. Mina. Will send patient back to the ER for further assessment.

## 2024-05-08 NOTE — ED PROVIDER NOTES
Emergency Department Note      History of Present Illness     Chief Complaint  Hip Pain and Back Pain (28th weeks pregnancy)    ZEUS Qiu is a  28 year old female who presents to the ED for evaluation of hip pain and back pain. Patient is currently 6 months pregnant. She reports having mild hip pain yesterday and took Tylenol. She wasn't able to sleep due to the hip pain but continued to go to work today. She went to  heavy boxes at work and heard a pop on her lower back when she turned. The pain radiated to her outside right hip when she sat down. No medical problems. No numbness or tingling when going to bathroom. Patient has a primary doctor appointment tomorrow.     Independent Historian  None    Review of External Notes  I reviewed the OBGYN note from earlier today ()  Past Medical History   Medical History and Problem List  Anxious depression  Atopic dermatitis  Syphilis    Medications  Zofran  Prenatal multivitamins     Surgical History   No past surgical history on file.  Physical Exam   Patient Vitals for the past 24 hrs:   BP Temp Temp src Pulse Resp SpO2   24 0201 132/66 98.4  F (36.9  C) Oral 85 12 100 %     Physical Exam  Constitutional: Alert, attentive, GCS 15   Eyes: EOM are normal, anicteric, conjugate gaze  CV: distal extremities warm, well perfused  Chest: Non-labored breathing on RA  GI: Gravid, nontender  MSK: Diffuse far lateral right upper leg tenderness, extending into right buttocks and right lumbar paraspinal muscular tenderness, no midline tenderness of the lumbar spine.  Neurological: Alert, attentive, moving all extremities equally.   Skin: Skin is warm and dry.      Diagnostics   Lab Results   None     Imaging  None     EKG   None     Independent Interpretation  None  ED Course    Medications Administered  Medications   dexAMETHasone (PF) (DECADRON) injectable solution used ORALLY 10 mg (10 mg Oral $Given 24 0324)     Procedures  Procedures      Discussion of Management  None    Social Determinants of Health adding to complexity of care  None    ED Course  ED Course as of 05/08/24 0301   Wed May 08, 2024   0220 I obtained history and examined the patient as noted above.    0233 I prepared the patient to be discharged home.      Medical Decision Making / Diagnosis     MDM  28-year-old without significant past medical history was 27 weeks pregnant sent down from L&D for evaluation of right-sided hip and back pain after reassuring baby monitoring.  She reports lifting heavy object at work yesterday causing pain into her low back and right hip to worsen.  Her pain is fully reproducible on exam, she has no red flag symptoms, I have lower suspicion for disc herniation, high suspicion for lumbar strain.  No indication for MRI at this time.  I recommended conservative management with Tylenol, single dose of Decadron, gentle stretching gentle massage and work note was given.  After returning from injury, recommend lifting more than 10 to 15 pounds.  She is due to follow-up with her primary care doctor tomorrow.  Return precautions reviewed.    Disposition  The patient was discharged.     ICD-10 Codes:    ICD-10-CM    1. Right hip pain  M25.551       2. Strain of lumbar region, initial encounter  S39.012A       3. 27 weeks gestation of pregnancy  Z3A.27          Celestino Brown MD  [employer]  3:02 AM 05/08/24       Scribe Disclosure:  I, Kimberly Harvey, am serving as a scribe at 2:24 AM on 5/8/2024 to document services personally performed by Celestino Brown MD based on my observations and the provider's statements to me.   [employer]      Celestino Brown MD  05/08/24 030

## 2024-05-08 NOTE — ED NOTES
Bed: ED12  Expected date:   Expected time:   Means of arrival:   Comments:  OB Patient w/back pain

## 2024-05-08 NOTE — PROGRESS NOTES
Pt sent from ER triage to L&D.  Had work place injury lifting boxes while stocking that caused back pain that makes walking difficult.  Notes some associated pelvic discomfort.  Good fetal movements.  Denies uterine contractions, vaginal bleeding, or leakage of fluid.  Did not strike her abdomen.  VSS.  Resting comfortably I'm bed.  Reassuring fetal tracing for 27w4d.  [place] quiet.  Will run UA to ensure no UTI.  Will send to ER for evaluation of her back injury at the work place as this is outside the scope of Obstetrics.

## 2024-05-08 NOTE — ED TRIAGE NOTES
Was seen from [facility] floor 28th weeks pregnancy  with complaint of right hip and lower back pain after lifting boxes at work.  Cleared from OBGY-active baby,good fetal hearttone,no vaginal bleeding,no contraction from the CTG as reported by the L&D nurse.     Triage Assessment (Adult)       Row Name 24 0158          Triage Assessment    Airway WDL WDL        Respiratory WDL    Respiratory WDL WDL        Skin Circulation/Temperature WDL    Skin Circulation/Temperature WDL WDL        Cardiac WDL    Cardiac WDL WDL        Peripheral/Neurovascular WDL    Peripheral Neurovascular WDL WDL        Cognitive/Neuro/Behavioral WDL    Cognitive/Neuro/Behavioral WDL WDL

## 2024-05-10 ENCOUNTER — HOSPITAL ENCOUNTER (OUTPATIENT)
Dept: ULTRASOUND IMAGING | Facility: CLINIC | Age: 29
Discharge: HOME | End: 2024-05-10
Attending: OBSTETRICS & GYNECOLOGY
Payer: MEDICAID

## 2024-05-10 ENCOUNTER — OFFICE VISIT (OUTPATIENT)
Dept: MATERNAL FETAL MEDICINE | Facility: CLINIC | Age: 29
End: 2024-05-10
Attending: OBSTETRICS & GYNECOLOGY
Payer: MEDICAID

## 2024-05-10 DIAGNOSIS — O98.119 SYPHILIS AFFECTING PREGNANCY, ANTEPARTUM: Primary | ICD-10-CM

## 2024-05-10 DIAGNOSIS — O98.119 SYPHILIS AFFECTING PREGNANCY, ANTEPARTUM: ICD-10-CM

## 2024-05-10 PROCEDURE — 76816 OB US FOLLOW-UP PER FETUS: CPT

## 2024-05-10 PROCEDURE — 99214 OFFICE O/P EST MOD 30 MIN: CPT | Mod: 25 | Performed by: OBSTETRICS & GYNECOLOGY

## 2024-05-10 PROCEDURE — 76816 OB US FOLLOW-UP PER FETUS: CPT | Mod: 26 | Performed by: OBSTETRICS & GYNECOLOGY

## 2024-05-10 NOTE — NURSING NOTE
Patient here for follow up ultrasound. Denies changes to health or medications since last ultrasound. States she has not received any of the bicillin injections yet. Patient reports positive fetal movement, denies contractions, leaking of fluid, or bleeding.  Education provided to patient on today's ultrasound.  SBAR given to GINGER MCKEON, see their note in Epic.

## 2024-05-10 NOTE — PROGRESS NOTES
Please see the imaging tab for details of the ultrasound performed today.    Ms. Qiu previously had a consultation with Dr. Moulton, Infectious Disease Specialist through [facility], on 3/5/2024. It was recommended that she receive Bicillin IM weekly for three weeks he could not rule out late latent syphilis. However, she has a reported allergy to IM penicillin and therefore referral to allergy clinic was made with plan to follow up back up with Dr. Moulton after the allergy visit. The patient was seen by Dr. Crouch  through the allergy clinic at [facility] on 3/20/24. She declined the penicillin skin testing, but did proceed with the amoxacillin oral challenge. She tolerated the initial dose and was monitored at the clinic. She was then given the remainder of the amoxacillin dose, but could not stay for observation per the protocol as she had to get to work. She notes that she did not have any adverse reaction to the amoxacillin after leaving the clinic. Ms. Qiu has not had any further follow up, and has not yet completed treatment for syphilis.     Recommend following up with Infectious Disease as was recommended at the time of their initial consultation. We discussed the importance of completing the evaluation and treatment for late latent syphilis as soon as possible as she is already approaching the third trimester. I asked Ms. Qiu to go to S clinic directly following today's ultrasound to scheduled an OB visit as soon as possible so they further coordination of care for treatment can be arranged. We also discussed routine prenatal care, including GDM screening at this time.    A repeat ultrasound has been scheduled here at 34 weeks to reevaluate fetal growth and anatomy due to history of possible incomplete treatment of syphilis as previously recommended.    Tonya Plunkett MD  Specialist in Maternal-Fetal Medicine

## 2024-05-17 ENCOUNTER — TELEPHONE (OUTPATIENT)
Dept: OBGYN | Facility: CLINIC | Age: 29
End: 2024-05-17
Payer: MEDICAID

## 2024-05-17 ENCOUNTER — PRENATAL OFFICE VISIT (OUTPATIENT)
Dept: OBGYN | Facility: CLINIC | Age: 29
End: 2024-05-17
Attending: ADVANCED PRACTICE MIDWIFE
Payer: MEDICAID

## 2024-05-17 ENCOUNTER — LAB (OUTPATIENT)
Dept: LAB | Facility: CLINIC | Age: 29
End: 2024-05-17
Attending: ADVANCED PRACTICE MIDWIFE
Payer: MEDICAID

## 2024-05-17 VITALS
DIASTOLIC BLOOD PRESSURE: 78 MMHG | BODY MASS INDEX: 36.64 KG/M2 | HEART RATE: 86 BPM | WEIGHT: 241 LBS | SYSTOLIC BLOOD PRESSURE: 119 MMHG

## 2024-05-17 DIAGNOSIS — O09.93 HRP (HIGH RISK PREGNANCY), THIRD TRIMESTER: Primary | ICD-10-CM

## 2024-05-17 DIAGNOSIS — O09.899 SUSCEPTIBLE TO VARICELLA (NON-IMMUNE), CURRENTLY PREGNANT: ICD-10-CM

## 2024-05-17 DIAGNOSIS — Z60.9 HIGH RISK SOCIAL SITUATION: ICD-10-CM

## 2024-05-17 DIAGNOSIS — O09.93 HRP (HIGH RISK PREGNANCY), THIRD TRIMESTER: ICD-10-CM

## 2024-05-17 DIAGNOSIS — Z86.19 HISTORY OF SYPHILIS: ICD-10-CM

## 2024-05-17 DIAGNOSIS — F41.8 ANXIOUS DEPRESSION: ICD-10-CM

## 2024-05-17 DIAGNOSIS — Z28.39 SUSCEPTIBLE TO VARICELLA (NON-IMMUNE), CURRENTLY PREGNANT: ICD-10-CM

## 2024-05-17 PROBLEM — Z36.89 ENCOUNTER FOR TRIAGE IN PREGNANT PATIENT: Status: RESOLVED | Noted: 2024-05-08 | Resolved: 2024-05-17

## 2024-05-17 LAB
ERYTHROCYTE [DISTWIDTH] IN BLOOD BY AUTOMATED COUNT: 12.6 % (ref 10–15)
GLUCOSE 1H P 50 G GLC PO SERPL-MCNC: 72 MG/DL (ref 70–129)
HCT VFR BLD AUTO: 33.9 % (ref 35–47)
HGB BLD-MCNC: 11 G/DL (ref 11.7–15.7)
MCH RBC QN AUTO: 28.3 PG (ref 26.5–33)
MCHC RBC AUTO-ENTMCNC: 32.4 G/DL (ref 31.5–36.5)
MCV RBC AUTO: 87 FL (ref 78–100)
PLATELET # BLD AUTO: 272 10E3/UL (ref 150–450)
RBC # BLD AUTO: 3.89 10E6/UL (ref 3.8–5.2)
WBC # BLD AUTO: 7.8 10E3/UL (ref 4–11)

## 2024-05-17 PROCEDURE — 82306 VITAMIN D 25 HYDROXY: CPT

## 2024-05-17 PROCEDURE — 36415 COLL VENOUS BLD VENIPUNCTURE: CPT

## 2024-05-17 PROCEDURE — 99207 PR PRENATAL VISIT: CPT | Performed by: ADVANCED PRACTICE MIDWIFE

## 2024-05-17 PROCEDURE — 85027 COMPLETE CBC AUTOMATED: CPT

## 2024-05-17 PROCEDURE — 96372 THER/PROPH/DIAG INJ SC/IM: CPT | Performed by: ADVANCED PRACTICE MIDWIFE

## 2024-05-17 PROCEDURE — 250N000011 HC RX IP 250 OP 636: Performed by: ADVANCED PRACTICE MIDWIFE

## 2024-05-17 PROCEDURE — 86706 HEP B SURFACE ANTIBODY: CPT

## 2024-05-17 PROCEDURE — G0463 HOSPITAL OUTPT CLINIC VISIT: HCPCS | Mod: 25 | Performed by: ADVANCED PRACTICE MIDWIFE

## 2024-05-17 PROCEDURE — 82950 GLUCOSE TEST: CPT

## 2024-05-17 RX ORDER — ONDANSETRON 4 MG/1
4 TABLET, ORALLY DISINTEGRATING ORAL EVERY 8 HOURS PRN
Qty: 60 TABLET | Refills: 1 | Status: ON HOLD | OUTPATIENT
Start: 2024-05-17 | End: 2024-06-28

## 2024-05-17 RX ORDER — ASPIRIN 81 MG/1
81 TABLET ORAL DAILY
Qty: 90 TABLET | Refills: 3 | Status: ON HOLD | OUTPATIENT
Start: 2024-05-17 | End: 2024-07-27

## 2024-05-17 RX ADMIN — PENICILLIN G BENZATHINE 2.4 MILLION UNITS: 2400000 INJECTION, SUSPENSION INTRAMUSCULAR at 11:05

## 2024-05-17 SDOH — SOCIAL STABILITY - SOCIAL INSECURITY: PROBLEM RELATED TO SOCIAL ENVIRONMENT, UNSPECIFIED: Z60.9

## 2024-05-17 ASSESSMENT — PAIN SCALES - GENERAL: PAINLEVEL: NO PAIN (0)

## 2024-05-17 NOTE — PROGRESS NOTES
Transfer of Care  SUBJECTIVE  28 year old woman presents to clinic for transfer of OB care appointment.    Patient's last menstrual period was 10/28/2023 (exact date).  at 28w6d by Estimated Date of Delivery: Aug 3, 2024 based on 8w ultrasound.     - Feels well overall.   - Pt was receiving prenatal care from [facility].  Initiated prenatal care at 8 weeks, has had 3 visit total.      - Reason for transfer to [facility] care would like to deliver at [facility].  Undecided about MD or Midwifery care.  Hoping to have a   - prenatal records available in Care Everywhere, reviewed   - After review of prenatal records, additional routine orders are recommended including   -Level II Ultrasound reviewed, normal results, follow up for history of syphilis.  - Pre pregnancy BMI 31.        OTHER CONCERNS: History of syphilis, allergy to PCN.  Patient recently completed allergy assessment and tolerated oral challenge.  Consents to IM PCN today.      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   Started on lexapro 10 mg daily on 2021.  Formatting of this note might be different from the original.   Formatting of this note might be different from the original.   Started on lexapro 10 mg daily on 2021.      Atopic dermatitis 2016    Chest pain, atypical 03/15/2017    Family history of sudden cardiac death (SCD) 03/15/2017    High risk social situation 2024    Formatting of this note might be different  "from the original.   This patient is enrolled in the [facility]. The program provides patients with support, education, referrals and resources during their pregnancy.       Reason for enrollment: Resources for transportation, food, housing, and \"other\"      For more information, please contact Julio Donovan, [facility] Spec    History of syphilis 12/28/2023    Formatting of this note might be different from the original.   Diagnosed 12/07/2021 and treated.   12/28/2023 reactive positive RPR, confirmation results pending. Patient advised by letter.   Consult w/ID advised verification of treatment w/[place] [facility] [facility]. No call back.   Patient needs Consult w/ID and possible repeat of PCN treatment.           - Moderate risk - has moderate risk factors for Pre E including Nulliparity , Pre Pregnancy body mass index >30 , and Sociodemographic characteristics (Racism, Less access given lower SES)   Since she meets two  of the moderate risk facrtors for Pre E -   so WILL consider starting low dose aspirin (81mg) starting between 12 and 28 weeks to prevent early onset preeclampsia.      PERSONAL/SOCIAL HISTORY  Partner is involved,  Blair,  single.  Employment: not currently working due to job injury.  History of anxiety or depression  yes- if Yes, therapy/medication/hospitalization not currently taking medication  Additional items: information given for [employer] resources      PSYCHIATRIC:  Denies mood changes.  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   Started on lexapro 10 mg daily on 7/2/2021.  Formatting of this note might be different from the original.   Formatting of this note might be different from the original.   " "Started on lexapro 10 mg daily on 7/2/2021.      Atopic dermatitis 03/08/2016    Chest pain, atypical 03/15/2017    Family history of sudden cardiac death (SCD) 03/15/2017    High risk social situation 01/02/2024    Formatting of this note might be different from the original.   This patient is enrolled in the [facility] Program. The program provides patients with support, education, referrals and resources during their pregnancy.       Reason for enrollment: Resources for transportation, food, housing, and \"other\"      For more information, please contact Julio Donovan, [facility] Spec    History of syphilis 12/28/2023    Formatting of this note might be different from the original.   Diagnosed 12/07/2021 and treated.   12/28/2023 reactive positive RPR, confirmation results pending. Patient advised by letter.   Consult w/ID advised verification of treatment [place] [facility] [facility]. No call back.   Patient needs Consult w/ID and possible repeat of PCN treatment.     .   This is her first pregnancy  Since her last LMP she denies use of alcohol, tobacco and street drugs.  HISTORY:  Family History   Problem Relation Age of Onset    No Known Problems Mother     No Known Problems Father     No Known Problems Sister     No Known Problems Sister     No Known Problems Sister     No Known Problems Sister     No Known Problems Sister     No Known Problems Sister     No Known Problems Sister     No Known Problems Sister     No Known Problems Sister     Hypertension Maternal Grandmother     Diabetes Maternal Grandfather     No Known Problems Paternal Grandmother      Social History     Socioeconomic History    Marital status: Single     Spouse name: None    Number of children: 0    Years of education: None    Highest education level: None   Occupational History    Occupation: Director of environmental services     Comment: [employer]   Tobacco Use    Smoking status: Former     Types: Cigarettes    Smokeless " tobacco: Never   Vaping Use    Vaping status: Never Used   Substance and Sexual Activity    Alcohol use: No     Comment: ocassionaly    Drug use: No    Sexual activity: Not Currently     Partners: Male     Birth control/protection: None     Social Determinants of Health      Received from [facility]    Financial Resource Strain    [facility]    Social Connections   Interpersonal Safety: Unknown (2/13/2024)    [facility]    Humiliation, Afraid, Rape, and Kick questionnaire     Physically Abused: No     Sexually Abused: No     Current Outpatient Medications   Medication Sig Dispense Refill    aspirin 81 MG EC tablet Take 1 tablet (81 mg) by mouth daily 90 tablet 3    ondansetron (ZOFRAN ODT) 4 MG ODT tab Take 1 tablet (4 mg) by mouth every 8 hours as needed for nausea 60 tablet 1    Prenatal Vit-Fe Fumarate-FA (PRENATAL MULTIVITAMIN W/IRON) 27-0.8 MG tablet Take 1 tablet by mouth daily 90 tablet 3     Current Facility-Administered Medications   Medication Dose Route Frequency Provider Last Rate Last Admin    Penicillin G Benzathine (BICILLIN L-A) injection 2.4 Million Units  2.4 Million Units Intramuscular Weekly          Allergies   Allergen Reactions    Penicillins Rash     Rash all over her body.     Rash all over her body.       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   Started on lexapro 10 mg daily on 7/2/2021.  Formatting of this note might be different from the original.   Formatting of this note might be different from the original.   Started on lexapro 10 mg daily on 7/2/2021.      Atopic dermatitis 03/08/2016    Chest pain, atypical 03/15/2017     "Family history of sudden cardiac death (SCD) 03/15/2017    High risk social situation 2024    Formatting of this note might be different from the original.   This patient is enrolled in the [facility] Program. The program provides patients with support, education, referrals and resources during their pregnancy.       Reason for enrollment: Resources for transportation, food, housing, and \"other\"      For more information, please contact Julio Donovan, [facility] Spec    History of syphilis 2023    Formatting of this note might be different from the original.   Diagnosed 2021 and treated.   2023 reactive positive RPR, confirmation results pending. Patient advised by letter.   Consult w/ID advised verification of treatment [place] [facility] [facility]. No call back.   Patient needs Consult w/ID and possible repeat of PCN treatment.       History reviewed. No pertinent surgical history.    OB History    Para Term  AB Living   2 0 0 0 1 0   SAB IAB Ectopic Multiple Live Births   0 1 0 0 0      # Outcome Date GA Lbr Maikel/2nd Weight Sex Type Anes PTL Lv   2 Current            1 IAB 2019 5w0d             Birth Comments: Medical            GYN History- Denies Abnormal Pap Smears                        Cervical procedures: no                        History of STI: no    I personally reviewed the past social/family/medical and surgical history on the date of service.       ROS: 10 point ROS neg other than the symptoms noted above in the HPI.    Objective  /78   Pulse 86   Wt 109.3 kg (241 lb)   LMP 10/28/2023 (Exact Date)   BMI 36.64 kg/m       See OB flow sheet    Assessment/Plan  28 year old , 28w6d weeks of pregnancy with MYRANDA of Aug 3, 2024 by US confirms    Patient Active Problem List    Diagnosis Date Noted    HRP (high risk pregnancy), third trimester 2024     Priority: Medium     [facility] Provider Group choice: " "unsure, needs more discussion  Partner's name: Blair  Employment: currently unemployed, was working at [employer].  [x]NOB folder  [x]Dating  [x]Fetal anatomy US ordered  [x] recommended LD ASA after 12 wks for PRE-E risk  [x]COVID vaccine completed  [x]Pap UTD    12-23wks________________________    [x]Rubella immune  []Hep B immune   [x]Varicella inon-mmune  [x]FLU shot did not receive      24-28wk_________________________  [x]EOB folder  [x]Labor plans: Planning epidural.  Wants to hire a , not planning family/FOB in room for birth.  []Prenatal Ed  [x]: Resources given 24  [x]Infant feeding plan Breast and bottle  [x][place] care provider choice [facility] pedis  [x]PP Contraception plan Planning tubal  [x]TDAP     29-35 wk________________________    []GCT nml results  []RSV    36-37 wks______________________   [] GBS   []OTC PP meds sent  []PP recovery plans/support:  []Planning CS-ERAS pkt    38-42 wks______________________  []IOL reason/plans  []Postdates BPP       Encounter for triage in pregnant patient 2024     Priority: Medium    Indication for care in labor and delivery, antepartum 2024     Priority: Medium    High risk social situation 2024     Priority: Medium      This patient is enrolled in the [facility]. The program provides patients with support, education, referrals and resources during their pregnancy.     Reason for enrollment: Resources for transportation, food, housing, and \"other\"    For more information, please contact Julio Donovan, [facility] Specialist, at [phone].      History of syphilis 2023     Priority: Medium      Diagnosed 2021 and treated.   2023 reactive positive RPR, confirmation results pending. Patient advised by letter.   Consult w/ID advised verification of treatment [place] [facility] [facility]. No call back.   Patient needs Consult w/ID and possible repeat of PCN treatment.      Susceptible to " varicella (non-immune), currently pregnant 2023     Priority: Medium      Patient will need varicella vaccine after delivery.      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        Family history of sudden cardiac death (SCD) 03/15/2017     Priority: Medium    Chest pain, atypical 03/15/2017     Priority: Medium    Atopic dermatitis 2016     Priority: Medium       Orders Placed This Encounter   Procedures    25- [place]-Vitamin D    Glucose 1 Hour    CBC with Platelets    Hepatitis B Surface Antibody         - Oriented to Practice, types of care, and how to reach a provider.  Pt prefers Undecided between CNM and MD, will continue to consider.     - Educational handout on the prevention of infections diseases during pregnancy provided.  - Reviewed healthy diet and foods to avoid, exercise and activity during pregnancy; avoiding exposure to toxoplasmosis; and maintenance of a generally healthy lifestyle.   - Discussed the harms, benefits, side effects and alternative therapies for current prescribed and OTC medications. Patient was encouraged to start prenatal vitamins as tolerated.    - Reviewed use of triage nurse line and contacting the on-call provider after hours for an urgent need such as fever, vagina bleeding, bladder or vaginal infection, rupture of membranes,  or term labor.      - Reviewed best evidence for: weight gain for her weight and height for pregnancy:  Based on pre-pregnancy BMI 31 RECOMMENDED WEIGHT GAIN: 15-25 lbs.    - Reviewed Moderate risk for Pre Eclampsia d/t   or meets two or more of the moderate risk factors including Nulliparity , Pre Pregnancy body mass index >30 , and Sociodemographic characteristics (Racism, Less access given lower SES) and ISagreeable to starting 81mg aspirin daily after 12 weeks .        - Pregnancy concerns to be addressed by provider at next OB visit include: latent syphilis, MFM surveillance  and PCN to  be started.    - All pt's questions discussed and answered.  Pt verbalized understanding of and agreement to plan of care.       - Continue scheduled prenatal care and prn if questions or concerns    LIBERTAD Montes CNM

## 2024-05-18 LAB
HBV SURFACE AB SERPL IA-ACNC: <3.5 M[IU]/ML
HBV SURFACE AB SERPL IA-ACNC: NONREACTIVE M[IU]/ML
VIT D+METAB SERPL-MCNC: 12 NG/ML (ref 20–50)

## 2024-05-20 PROBLEM — E55.9 VITAMIN D DEFICIENCY: Status: ACTIVE | Noted: 2024-05-20

## 2024-05-20 PROBLEM — Z78.9 NONIMMUNE TO HEPATITIS B VIRUS: Status: ACTIVE | Noted: 2024-05-20

## 2024-05-21 ENCOUNTER — TELEPHONE (OUTPATIENT)
Dept: OBGYN | Facility: CLINIC | Age: 29
End: 2024-05-21
Payer: MEDICAID

## 2024-05-21 NOTE — TELEPHONE ENCOUNTER
Patient was returning our call regarding her lab results.     I went over with her that her Vitamin D level is low and we recommend that she start taking Vitamin D3 5,000 international unit(s) daily. Her hemoglobin is still just a little low. Patient stated she has not been taking her prenatal vitamins. I let her know to start taking those again and it should help with her hemoglobin.     Also went over that she will need her have her Hepatitis B vaccine series started at her next visit due to she in non immune.     All questions answered.

## 2024-05-21 NOTE — TELEPHONE ENCOUNTER
Received page regarding pick vaginal discharge. Attempted to call back x3, no answer. Line busy signal.     LIBERTAD Perea CNM

## 2024-05-28 ENCOUNTER — TELEPHONE (OUTPATIENT)
Dept: OBGYN | Facility: CLINIC | Age: 29
End: 2024-05-28
Payer: MEDICAID

## 2024-06-13 ENCOUNTER — TELEPHONE (OUTPATIENT)
Dept: OBGYN | Facility: CLINIC | Age: 29
End: 2024-06-13
Payer: COMMERCIAL

## 2024-06-13 NOTE — TELEPHONE ENCOUNTER
[facility] Call Center    Phone Message    May a detailed message be left on voicemail: yes     Reason for Call: Form or Letter   Type or form/letter needing completion: letter for worker's comp case stating pt can't work due to back injury  Provider: Shelia Comer   Date form needed: ASAP  Once completed: Pt would like to speak to someone from her care team.     Action Taken: Other: ump whs    Travel Screening: Not Applicable     Date of Service:

## 2024-06-13 NOTE — TELEPHONE ENCOUNTER
Writer tried to reach the patient to discuss with her the message we received through the call center.    Patient was requesting that we provide a letter for her worker's comp case for a back injury. I received her VM and writer left a message letting her know that we have only been seeing you for your prenatal care and you need to contact the doctor that you have been seeing for your back injury. Call us with any further questions at [phone]

## 2024-06-21 ENCOUNTER — HOSPITAL ENCOUNTER (OUTPATIENT)
Dept: ULTRASOUND IMAGING | Facility: CLINIC | Age: 29
Discharge: HOME | End: 2024-06-21
Attending: OBSTETRICS & GYNECOLOGY
Payer: COMMERCIAL

## 2024-06-21 ENCOUNTER — OFFICE VISIT (OUTPATIENT)
Dept: MATERNAL FETAL MEDICINE | Facility: CLINIC | Age: 29
End: 2024-06-21
Attending: OBSTETRICS & GYNECOLOGY
Payer: COMMERCIAL

## 2024-06-21 ENCOUNTER — TELEPHONE (OUTPATIENT)
Dept: OBGYN | Facility: CLINIC | Age: 29
End: 2024-06-21

## 2024-06-21 DIAGNOSIS — O98.119 SYPHILIS AFFECTING PREGNANCY, ANTEPARTUM: ICD-10-CM

## 2024-06-21 DIAGNOSIS — O98.119 SYPHILIS IN PREGNANCY, ANTEPARTUM: Primary | ICD-10-CM

## 2024-06-21 DIAGNOSIS — O98.113 SYPHILIS AFFECTING PREGNANCY IN THIRD TRIMESTER: Primary | ICD-10-CM

## 2024-06-21 DIAGNOSIS — A52.8 LATE LATENT SYPHILIS: Primary | ICD-10-CM

## 2024-06-21 DIAGNOSIS — O09.93 HRP (HIGH RISK PREGNANCY), THIRD TRIMESTER: ICD-10-CM

## 2024-06-21 PROCEDURE — 76816 OB US FOLLOW-UP PER FETUS: CPT | Mod: 26 | Performed by: OBSTETRICS & GYNECOLOGY

## 2024-06-21 PROCEDURE — 76816 OB US FOLLOW-UP PER FETUS: CPT

## 2024-06-21 PROCEDURE — G0463 HOSPITAL OUTPT CLINIC VISIT: HCPCS | Performed by: OBSTETRICS & GYNECOLOGY

## 2024-06-21 PROCEDURE — 99214 OFFICE O/P EST MOD 30 MIN: CPT | Mod: 25 | Performed by: OBSTETRICS & GYNECOLOGY

## 2024-06-21 NOTE — TELEPHONE ENCOUNTER
Spoke with Kanika from [facility] about Anna's penicillin injections for syphilis. Pt has one shot on 5/17 and was recommended to get two more injections weekly. Pt did not return for these injections due to the pain from the IM injection. Spoke with Dr. Hampton in clinic, who placed new orders for injection and scheduled pt to come in after [facility] appt.    Received another call from Kanika, stating that Anna does not want to receive any more injections to treat her syphilis. Due to untreated syphilis, [facility] recommends weekly BPPs and ROBs until end of pregnancy. Pt will come down to the  after her appointment to schedule these, as we have had trouble reaching her over the phone and MyChart. BPP orders placed so  can schedule ROBs at the same time. They also recommend repeating the RPR titer next week.     Routing to M team as an FYI.

## 2024-06-21 NOTE — PROGRESS NOTES
Please see the imaging tab for details of the ultrasound performed today.    Ms. Qiu received one dose of penicillin for treatment of latent syphilis on 2024. She notes that the injection was very painful and therefore did not complete the three dose series. She shared that she does not believe that she has latent syphilis, as she was treated for this in  with doxycycline, although did not have the follow up RPR after treatment. She would like to have her RPR re-drawn at her next OB visit, and does not wish to proceed with the penicillin course at this time (would require all three doses given length of time since last dose). We discussed the risks of syphilis in pregnancy, including the increased risk for stillbirth and it can also impact the care for baby after delivery and that treatment is recommended. We discussed the option for an ID consultation at [facility] (previously saw Dr. Recinos through [facility]).      surveillance with weekly BPP is recommended to begin next week given the diagnosis of syphilis in pregnancy. We presume the  surveillance will be performed in your office in conjunction with her OB care.  Tonya Plunkett MD  Specialist in Maternal-Fetal Medicine

## 2024-06-21 NOTE — NURSING NOTE
Pt at [facility] for ultrrasound- see detailed report under imaging tab.  Pt reports positive fetal movement, occasional contractions.  Denies bleeding, loss of fluid and other concerns.  States she has only received one injection to treat syphilis infection- reports it was painful and she didn't want to go thru that again but then stated that she forgot and asked if she can get one today.  Writer assured pt this all with be reviewed with MD in clinic today and a plan will be made.  SBAR given to MD    Discussed with [facility] RN- pt to present to [facility] for PCN injection.    Addendum- after discussion with MD pt declines PCN injection.  MD recommendations discussed with [facility] RN and pt to present to [facility] to schedule weekly OB visits/ BPP to be done at [facility].

## 2024-06-25 NOTE — PROGRESS NOTES
"[facility]    CC: Follow-up prenatal care    Subjective:  Anna Qiu is feeling ill today.  Patient reports leakage of fluid for one month worsening the past few days. She describes the fluid as clear and watery. Denies itching or vaginal bleeding. She was instructed to present to Labor and Delivery yesterday afternoon but was unable to make it. She is experiencing nausea throughout her pregnancy. Here in the clinic she is very nauseous and requesting Zofran. She vomits daily and states it often persists throughout the day. She is having occasional [place] contractions. She has noticed a slight increase in shortness of breath and mild bilateral feet swelling. Reports good fetal movement. Feeling \"done\" with this pregnancy and wondering when she can deliver.     Questions and concerns:   - Vaginal discharge  - Nausea  - DNA paternity testing     Pregnancy notable for:  - scant prenatal care  - class 1 obesity   - history of syphillis  - varicella NI  - Hep B NI  - mild polyhydramnios  - transverse fetal presentation    Objective:  /79   Pulse 79   Ht 1.727 m (5' 8\")   Wt 112 kg (247 lb)   LMP 10/28/2023 (Exact Date)   BMI 37.56 kg/m    General: Alert, uncomfortable appearing holding emesis bag    Declines speculum exam.     See OB flowsheet    Assessment/plan:   28 year old  at 34w6d by LMP c/w 8w2d US presenting for follow-up prenatal care.     Rule out PPROM  - Leakage of fluid potentially due to rupture of membranes. Patient instructed to present to Labor and Delivery for further evaluation. Patient was taken by transport from clinic to L&D. Declined speculum exam in clinic.     Prenatal care:  -OB labs reviewed: B positive, rubella immune, HIV/hepatitis B/hepatitis C negative  -Genetics: not done  -Ultrasound: L2 US normal  -3rd tri labs: GCT 72, Hgb 11.0, trep neg   -Immunizations: S/p Tdap  -GBS at 36 weeks  -Feeding:breast and formula   -Contraception: " desires PPTL, FTP signed today 6/28.     Hx of syphilis with possible incomplete treatment (1 dose bicillin IM) but could not rule-out late latent syphillis, declines further treatment   - RPR titer 1:2 (12/28/23)>1:2 (1/26/24), needs repeat RPR   - growth ultrasound 6/21/24: EFW 2401g 57%, cephalic   - weekly BPP, last 6/27 BPP 8/8, MVP 8.6, transverse fetal presentation. Discussed presentation and rechecking at 36 weeks.    Anna declines further work-up or management in clinic today. Recommended presenting to L&D for r/o PPROM and management of severe nausea.     Eben Patterson, MS3    I was present with the medical student who participated in the service and in the documentation of the note. I have verified the history and personally performed the physical exam and medical decision making. I agree with the assessment and plan of care as documented in the note.    Demi Fuentes MD

## 2024-06-27 ENCOUNTER — TELEPHONE (OUTPATIENT)
Dept: OBGYN | Facility: CLINIC | Age: 29
End: 2024-06-27

## 2024-06-27 ENCOUNTER — NURSE TRIAGE (OUTPATIENT)
Dept: OBGYN | Facility: CLINIC | Age: 29
End: 2024-06-27
Payer: COMMERCIAL

## 2024-06-27 ENCOUNTER — ANCILLARY PROCEDURE (OUTPATIENT)
Dept: ULTRASOUND IMAGING | Facility: CLINIC | Age: 29
End: 2024-06-27
Attending: ADVANCED PRACTICE MIDWIFE
Payer: COMMERCIAL

## 2024-06-27 DIAGNOSIS — O98.119 SYPHILIS IN PREGNANCY, ANTEPARTUM: ICD-10-CM

## 2024-06-27 PROCEDURE — 76819 FETAL BIOPHYS PROFIL W/O NST: CPT | Mod: 26 | Performed by: OBSTETRICS & GYNECOLOGY

## 2024-06-27 PROCEDURE — 76819 FETAL BIOPHYS PROFIL W/O NST: CPT

## 2024-06-27 NOTE — TELEPHONE ENCOUNTER
"S-(situation): After Anna's BPP today, reported LOF from vagina. Writer went to assess in-clinic.    B-(background):  at 34w5d. Untreated syphilis (pt declined further PCN injections).     A-(assessment): See below for full assessment. Pt has noticed intermittent leaking of clear fluid from the vagina for around a month, but notes that it has significantly increased over the past couple of days. This fluid is thin, clear, without distinct odor. Also notes intermittent abdominal cramping with some vaginal/lower abdominal burning. Also notes vaginal pressure, occasional [place] contractions. No vaginal bleeding or decreased FM.    R-(recommendations): Pt unable to walk over to L&D with writer because she has to drop her mother's car off so she can go to work. Reiterated recommendation to present to L&D and pt stated that she will have her mother drop her off on her way to work, around 1:30 PM this afternoon. Paged CNM on call and notified L&D charge RN.    Answer Assessment - Initial Assessment Questions  1. ONSET: \"When did you notice the fluid coming out of your vagina?\"         On/off for about a month but noticed a signifcant increase a couple of days ago.    2. CONTRACTIONS: \"Are you having any contractions?\" If Yes, ask: \"Describe the contractions that you are having.\" (e.g., duration, frequency, regularity, severity)      Mild abdominal cramping with some vaginal burning. Vaginal pressure.    3. MYRANDA: \"What date are you expecting to deliver?\"      8/3/24    4. PARITY: \"Have you had a baby before?\" If Yes, ask: \"How long did the labor last?\"      First baby    5. FETAL MOVEMENT: \"Has the baby's movement decreased or changed significantly from normal?\"      No change, moving a lot    6. OTHER SYMPTOMS: \"Do you have any other symptoms?\" (e.g., abdomen pain, fever, hand or face swelling, vaginal bleeding)      No odd smell of discharge, vaginal bleeding,    Protocols used: Pregnancy - Rupture of " Membranes [id number]      Has to return moms car but will return around 1:30 for evaluation

## 2024-06-27 NOTE — PATIENT INSTRUCTIONS
Thank you for trusting us with your care!     If you need to contact us for questions about:  Symptoms, Scheduling & Medical Questions; Non-urgent (2-3 day response) Tonyedvin message, Urgent (needing response today) [phone] (if after 3:30pm next day response)   Prescriptions: Please call your Pharmacy   Billing: [facility] [phone] or [facility]:[phone]  Weeks 30 to 32 of Your Pregnancy: Care Instructions  Your baby is growing more every day. Its eyes can open and close, and it may have hair on its head. Your baby may sleep 20 to 45 minutes at a time and is more active at certain times.    You should feel your baby move several times every day. Your baby now turns less and kicks more.    This is a good time to tour your hospital or birthing center. You may also want to find childcare if needed.    To ease heartburn    Avoid foods that make your symptoms worse, such as chocolate, spicy foods, and caffeine.  Avoid bending over or lying down after meals.  Do not eat for 2 hours before bedtime.  Take antacids like Tums, but don't take ones that have sodium bicarbonate, magnesium trisilicate, or aspirin.    To care for large, swollen veins (varicose veins)    Try to avoid standing for long periods of time.  Sit with your feet propped up.  Wear support hose.  Get some exercise every day, like walking or swimming.  Counting your baby's kicks  Your doctor may ask you to count your baby's movements, such as kicks, flutters, or rolls.    Find a quiet place, and get comfortable. Write down your start time. Count your baby's movements (except hiccups). When your baby has moved 10 times, you can stop counting. Write down how many minutes it took.    If an hour goes by and you don't feel 10 movements, have something to eat or drink. Count for another hour. If you don't feel at least 10 movements in the 2-hour period, call your doctor.  Follow-up care is a key part of your treatment and safety. Be sure to make and go  "to all appointments, and call your doctor if you are having problems. It's also a good idea to know your test results and keep a list of the medicines you take.  Where can you learn more?  Go to https://www.kidthing.net/patiented  Enter X471 in the search box to learn more about \"Weeks 30 to 32 of Your Pregnancy: Care Instructions.\"  Current as of: July 10, 2023               Content Version: 14.0    7547-6402 [employer].   Care instructions adapted under license by your healthcare professional. If you have questions about a medical condition or this instruction, always ask your healthcare professional. [employer] disclaims any warranty or liability for your use of this information.      Learning About Birth Control After Childbirth  What is birth control?  Birth control is any method used to prevent pregnancy. If you have vaginal sex without birth control, you could get pregnant--even if you haven't started having periods again. You're less likely to get pregnant while breastfeeding, but it's still possible. Finding birth control that works for you can help avoid an unplanned pregnancy.  There are many kinds of birth control. Each has pros and cons. Find what works for you. Talk to your doctor if you've just given birth or are breastfeeding.    Long-acting reversible contraception (LARC). These are placed inside your body by a doctor. They can prevent pregnancy for years.  Examples include:  An implant (hormonal).  Copper intrauterine device (IUD).  Hormonal IUDs.    Short-acting hormonal methods. These release hormones. Examples include:  Combination birth control pills (\"the pill\").  Skin patches.  A vaginal ring.  A shot.  Mini-pills. Choose progestin-only options soon after giving birth.    Barrier methods. Use these every time you have vaginal sex.  Examples include:  External (male) condoms.  Internal (female) condoms.  Diaphragms.  Cervical caps.  Sponges.    Spermicides. These " "kill sperm or stop sperm from moving. They can be gels, creams, foams, films, or tablets. Use them before vaginal sex.  Examples include:  Nonoxynol-9.  pH regulator gel.    Permanent birth control (sterilization). This can be an option if you're sure that you don't want to get pregnant later.  Examples include:  Vasectomy.  Having tubes tied (tubal ligation).    Emergency contraception. This is a backup method. Use it if you didn't use birth control or your birth control method failed.  Examples include:  Copper and hormonal IUDs.  Emergency contraceptive pills.    Fertility awareness. You'll learn when you are most likely to become pregnant (are fertile). You can avoid vaginal sex at that time.  It's also called:  Natural family planning.  The rhythm method.    Breastfeeding. This is most effective when all of these are true:  Your baby is younger than 6 months old.  You're breastfeeding and not bottle-feeding at all.  You aren't having periods.  Follow-up care is a key part of your treatment and safety. Be sure to make and go to all appointments, and call your doctor if you are having problems. It's also a good idea to know your test results and keep a list of the medicines you take.  Where can you learn more?  Go to https://www.[employer].net/patiented  Enter X408 in the search box to learn more about \"Learning About Birth Control After Childbirth.\"  Current as of: July 10, 2023               Content Version: 14.0    8684-8913 [employer].   Care instructions adapted under license by your healthcare professional. If you have questions about a medical condition or this instruction, always ask your healthcare professional. [employer] disclaims any warranty or liability for your use of this information.      Weeks 32 to 34 of Your Pregnancy: Care Instructions    Decide whether you want to bank or donate your baby's umbilical cord blood. If you want to save this blood, you have to arrange " "for it ahead of time.    Decide about circumcision. Personal, Congregation, or cultural beliefs may play a role in your decision. You get to decide what you want for your baby.    Learn how to ease hemorrhoids.    Get more liquids, fruits, vegetables, and fiber in your diet.  Avoid sitting for too long.  Clean yourself with moist toilet paper. Or try witch hazel pads.  Try ice packs or warm sitz baths for discomfort.  Use hydrocortisone cream for pain or itching.  Ask your doctor about stool softeners.    Consider the benefits of breastfeeding.    It reduces your baby's risk of sudden infant death syndrome (SIDS).   babies are less likely to get certain infections. And they're less likely to be obese or get diabetes later in life.  It can lower your risk of breast and ovarian cancers and osteoporosis.  It saves you money.  Follow-up care is a key part of your treatment and safety. Be sure to make and go to all appointments, and call your doctor if you are having problems. It's also a good idea to know your test results and keep a list of the medicines you take.  Where can you learn more?  Go to https://www.[employer].net/patiented  Enter X711 in the search box to learn more about \"Weeks 32 to 34 of Your Pregnancy: Care Instructions.\"  Current as of: July 10, 2023               Content Version: 14.0    3989-8228 [employer].   Care instructions adapted under license by your healthcare professional. If you have questions about a medical condition or this instruction, always ask your healthcare professional. [employer] disclaims any warranty or liability for your use of this information.      You have been provided the Any Day Now: Early Labor at Home document.    Additional copies can be found here:  [email or web address]  You have been provided the What I'd Wish I'd Known About Giving Birth document.    Additional copies can be found here:  [email or web address]  Weeks 34 to " 36 of Your Pregnancy: Care Instructions  Your belly has grown quite large. It's almost time to give birth! Your baby's lungs are almost ready to breathe air. The skull bones are firm enough to protect your baby's head. But they're soft enough to move down through the birth canal.    You might be wondering what to expect during labor. Because each birth is different, there's no way to know exactly what childbirth will be like for you. Talk to your doctor or midwife about any concerns you have.    You'll probably have a test for group B streptococcus (GBS). GBS is bacteria that can live in the vagina and rectum. GBS can make your baby sick after birth. If you test positive, you'll get antibiotics during labor.    Choose what type of pain relief you would like during labor.  You can choose from a few types, including medicine and non-medicine options. You may want to use several types of pain relief.     Know how medicines can help with pain during labor.  Some medicines lower anxiety and help with some of the pain. Others make your lower body numb so that you will feel less pain.     Tell your doctor about your pain medicine choice.  Do this before you start labor or very early in your labor. You may be able to change your mind during labor.     Learn about the stages of labor.    The first stage includes the early (latent) and active phases of labor. Contractions start in early labor. During active labor, contractions get stronger, last longer, and happen more often. And the cervix opens more rapidly.  The second stage starts when you're ready to push. During this stage, your baby is born.  During the third stage, you'll usually have a few more contractions to push out the placenta.   Follow-up care is a key part of your treatment and safety. Be sure to make and go to all appointments, and call your doctor if you are having problems. It's also a good idea to know your test results and keep a list of the medicines you  "take.  Where can you learn more?  Go to https://www.[employer].net/patiented  Enter B912 in the search box to learn more about \"Weeks 34 to 36 of Your Pregnancy: Care Instructions.\"  Current as of: July 10, 2023               Content Version: 14.0    8769-3237 [facility].   Care instructions adapted under license by your healthcare professional. If you have questions about a medical condition or this instruction, always ask your healthcare professional. [facility] disclaims any warranty or liability for your use of this information.      Group B Strep During Pregnancy: Care Instructions  Overview     Group B strep infection is caused by a type of bacteria. It's a different kind of bacteria than the kind that causes strep throat.  You may have this kind of bacteria in your body. Sometimes it may cause an infection, but most of the time it doesn't make you sick or cause symptoms. But if you pass the bacteria to your baby during the birth, it can cause serious health problems for your baby.  If you have this bacteria in your body, you will get antibiotics when you are in labor. Antibiotics help prevent problems for a  baby.  After birth, doctors will watch and may test your baby. If your baby tests positive for Group B strep, your baby will get antibiotics.  If you plan to breastfeed your baby, don't worry. It will be safe to breastfeed.  Follow-up care is a key part of your treatment and safety. Be sure to make and go to all appointments, and call your doctor if you are having problems. It's also a good idea to know your test results and keep a list of the medicines you take.  How can you care for yourself at home?  If your doctor has prescribed antibiotics, take them as directed. Do not stop taking them just because you feel better. You need to take the full course of antibiotics.  Tell your doctor if you are allergic to any antibiotic.  If you go into labor, or your water breaks, " "go to the hospital. Your doctor will give you antibiotics to help protect your baby from infection.  Tell the doctors and nurses if you have an allergy to penicillin.  Tell the doctors and nurses at the hospital that you tested positive for group B strep.  When should you call for help?   Call your doctor now or seek immediate medical care if:    You have symptoms of a urinary tract infection. These may include:  Pain or burning when you urinate.  A frequent need to urinate without being able to pass much urine.  Pain in the flank, which is just below the rib cage and above the waist on either side of the back.  Blood in your urine.  A fever.     You think you are in labor or your water has broken.     You have pain in your belly or pelvis.   Watch closely for changes in your health, and be sure to contact your doctor if you have any problems.  Where can you learn more?  Go to https://www.[employer].net/patiented  Enter M001 in the search box to learn more about \"Group B Strep During Pregnancy: Care Instructions.\"  Current as of: June 12, 2023               Content Version: 14.0    1365-3798 [employer].   Care instructions adapted under license by your healthcare professional. If you have questions about a medical condition or this instruction, always ask your healthcare professional. [employer] disclaims any warranty or liability for your use of this information.      "

## 2024-06-27 NOTE — TELEPHONE ENCOUNTER
I was informed that Anna has not showed up to L&D yet. Attempted to call pt to remind her of the recommendation, no answer and unable to LVM (voicemail not set up).

## 2024-06-28 ENCOUNTER — HOSPITAL ENCOUNTER (OUTPATIENT)
Facility: CLINIC | Age: 29
End: 2024-06-28
Admitting: NURSE PRACTITIONER
Payer: COMMERCIAL

## 2024-06-28 ENCOUNTER — PRENATAL OFFICE VISIT (OUTPATIENT)
Dept: OBGYN | Facility: CLINIC | Age: 29
End: 2024-06-28
Attending: ADVANCED PRACTICE MIDWIFE
Payer: COMMERCIAL

## 2024-06-28 ENCOUNTER — HOSPITAL ENCOUNTER (OUTPATIENT)
Facility: CLINIC | Age: 29
Discharge: HOME | End: 2024-06-28
Attending: ADVANCED PRACTICE MIDWIFE | Admitting: NURSE PRACTITIONER
Payer: COMMERCIAL

## 2024-06-28 VITALS — SYSTOLIC BLOOD PRESSURE: 115 MMHG | TEMPERATURE: 98.8 F | RESPIRATION RATE: 20 BRPM | DIASTOLIC BLOOD PRESSURE: 70 MMHG

## 2024-06-28 VITALS
HEIGHT: 68 IN | BODY MASS INDEX: 37.44 KG/M2 | HEART RATE: 79 BPM | WEIGHT: 247 LBS | SYSTOLIC BLOOD PRESSURE: 119 MMHG | DIASTOLIC BLOOD PRESSURE: 79 MMHG

## 2024-06-28 DIAGNOSIS — O09.93 SUPERVISION OF HIGH RISK PREGNANCY IN THIRD TRIMESTER: Primary | ICD-10-CM

## 2024-06-28 DIAGNOSIS — O21.9 NAUSEA AND VOMITING DURING PREGNANCY: ICD-10-CM

## 2024-06-28 DIAGNOSIS — O21.9 NAUSEA AND VOMITING IN PREGNANCY: ICD-10-CM

## 2024-06-28 DIAGNOSIS — F41.8 ANXIOUS DEPRESSION: ICD-10-CM

## 2024-06-28 DIAGNOSIS — Z36.89 ENCOUNTER FOR TRIAGE IN PREGNANT PATIENT: Primary | ICD-10-CM

## 2024-06-28 DIAGNOSIS — Z86.19 HISTORY OF SYPHILIS: ICD-10-CM

## 2024-06-28 LAB
ALBUMIN UR-MCNC: 10 MG/DL
APPEARANCE UR: CLEAR
BILIRUB UR QL STRIP: NEGATIVE
CLUE CELLS: ABNORMAL
COLOR UR AUTO: ABNORMAL
CRYSTALS AMN MICRO: NORMAL
GLUCOSE UR STRIP-MCNC: NEGATIVE MG/DL
HGB UR QL STRIP: NEGATIVE
KETONES UR STRIP-MCNC: NEGATIVE MG/DL
LEUKOCYTE ESTERASE UR QL STRIP: ABNORMAL
MUCOUS THREADS #/AREA URNS LPF: PRESENT /LPF
NITRATE UR QL: NEGATIVE
PH UR STRIP: 8 [PH] (ref 5–7)
RBC URINE: 1 /HPF
RUPTURE OF FETAL MEMBRANES BY ROM PLUS: NEGATIVE
SP GR UR STRIP: 1.01 (ref 1–1.03)
SQUAMOUS EPITHELIAL: 13 /HPF
TRANSITIONAL EPI: <1 /HPF
TRICHOMONAS, WET PREP: ABNORMAL
UROBILINOGEN UR STRIP-MCNC: NORMAL MG/DL
WBC URINE: 5 /HPF
WBC'S/HIGH POWER FIELD, WET PREP: ABNORMAL
YEAST, WET PREP: ABNORMAL

## 2024-06-28 PROCEDURE — 36415 COLL VENOUS BLD VENIPUNCTURE: CPT | Performed by: NURSE PRACTITIONER

## 2024-06-28 PROCEDURE — 86780 TREPONEMA PALLIDUM: CPT | Performed by: NURSE PRACTITIONER

## 2024-06-28 PROCEDURE — 59025 FETAL NON-STRESS TEST: CPT | Mod: 26 | Performed by: NURSE PRACTITIONER

## 2024-06-28 PROCEDURE — 99207 PR PRENATAL VISIT: CPT | Performed by: STUDENT IN AN ORGANIZED HEALTH CARE EDUCATION/TRAINING PROGRAM

## 2024-06-28 PROCEDURE — 86593 SYPHILIS TEST NON-TREP QUANT: CPT | Performed by: NURSE PRACTITIONER

## 2024-06-28 PROCEDURE — 81001 URINALYSIS AUTO W/SCOPE: CPT | Performed by: NURSE PRACTITIONER

## 2024-06-28 PROCEDURE — 84112 EVAL AMNIOTIC FLUID PROTEIN: CPT | Performed by: NURSE PRACTITIONER

## 2024-06-28 PROCEDURE — G0463 HOSPITAL OUTPT CLINIC VISIT: HCPCS | Performed by: STUDENT IN AN ORGANIZED HEALTH CARE EDUCATION/TRAINING PROGRAM

## 2024-06-28 PROCEDURE — 87210 SMEAR WET MOUNT SALINE/INK: CPT | Performed by: NURSE PRACTITIONER

## 2024-06-28 PROCEDURE — G0463 HOSPITAL OUTPT CLINIC VISIT: HCPCS | Mod: 27

## 2024-06-28 PROCEDURE — 87653 STREP B DNA AMP PROBE: CPT | Performed by: NURSE PRACTITIONER

## 2024-06-28 PROCEDURE — 99214 OFFICE O/P EST MOD 30 MIN: CPT | Mod: 25 | Performed by: NURSE PRACTITIONER

## 2024-06-28 PROCEDURE — 86592 SYPHILIS TEST NON-TREP QUAL: CPT | Performed by: NURSE PRACTITIONER

## 2024-06-28 PROCEDURE — 250N000011 HC RX IP 250 OP 636: Performed by: NURSE PRACTITIONER

## 2024-06-28 RX ORDER — ONDANSETRON 4 MG/1
4-8 TABLET, FILM COATED ORAL EVERY 8 HOURS PRN
Qty: 60 TABLET | Refills: 3 | Status: ON HOLD | OUTPATIENT
Start: 2024-06-28 | End: 2024-06-28

## 2024-06-28 RX ORDER — ONDANSETRON 4 MG/1
4 TABLET, FILM COATED ORAL EVERY 8 HOURS PRN
Qty: 30 TABLET | Refills: 0 | Status: ON HOLD | OUTPATIENT
Start: 2024-06-28 | End: 2024-06-28

## 2024-06-28 RX ORDER — LIDOCAINE 40 MG/G
CREAM TOPICAL
Status: DISCONTINUED | OUTPATIENT
Start: 2024-06-28 | End: 2024-06-28 | Stop reason: HOSPADM

## 2024-06-28 RX ORDER — ONDANSETRON 4 MG/1
4 TABLET, ORALLY DISINTEGRATING ORAL EVERY 6 HOURS PRN
Qty: 60 TABLET | Refills: 1 | Status: ON HOLD | OUTPATIENT
Start: 2024-06-28 | End: 2024-07-29

## 2024-06-28 RX ORDER — ONDANSETRON 4 MG/1
4 TABLET, FILM COATED ORAL EVERY 6 HOURS PRN
Status: DISCONTINUED | OUTPATIENT
Start: 2024-06-28 | End: 2024-06-28 | Stop reason: HOSPADM

## 2024-06-28 RX ADMIN — ONDANSETRON HYDROCHLORIDE 4 MG: 4 TABLET, FILM COATED ORAL at 13:43

## 2024-06-28 ASSESSMENT — ACTIVITIES OF DAILY LIVING (ADL)
ADLS_ACUITY_SCORE: 35
ADLS_ACUITY_SCORE: 20

## 2024-06-28 ASSESSMENT — PAIN SCALES - GENERAL: PAINLEVEL: NO PAIN (0)

## 2024-06-28 NOTE — PLAN OF CARE
Goal Outcome Evaluation:    Patient is a  at 34.6 weeks here to R/O ROM. States she has been leaking a clear fluid for about a month. Was in the clinic earlier today for OB appointment. She also complains of lower abdominal pain and burning. Says it comes and goes. Patient requesting zofran. She feels nauseated and said she ran out of her home supply a few days ago. Occasionally coughing and gagging , but no emesis. Ice chips given. VSS. Denies bleeding, or burning with urination.  +FM. ROM plus and slides sent for ferning. [facility] notified of patient s admission at 1250.

## 2024-06-28 NOTE — PROGRESS NOTES
HOSPITAL TRIAGE NOTE  ===================    CHIEF COMPLAINT  ========================  Anna Qiu is a 28 year old patient presenting today at 34w6d for evaluation of vaginal discharge.    Patient's last menstrual period was 10/28/2023 (exact date).  Estimated Date of Delivery: Aug 3, 2024       HPI  ==================   Anna reports water and mucous-like discharge that has been intermittent over the last month. She does not need to wear a pad.   Prenatal record and labs reviewed from [facility], through [facility].    CONTRACTIONS: none  ABDOMINAL PAIN: none  FETAL MOVEMENT: active    VAGINAL BLEEDING: none  RUPTURE OF MEMBRANES: Neg, neg fern and rom plus.   PELVIC PAIN: none    PREGNANCY COMPLICATIONS: history of syphilis       # Pain Assessment:      2024     3:07 AM   Current Pain Score   Patient currently in pain? yes   Anna carrera pain level was assessed and she currently denies pain.        REVIEW OF SYSTEMS  =====================  C: NEGATIVE for fever, chills  I: NEGATIVE for worrisome rashes, moles or lesions  E: NEGATIVE for vision changes or irritation  R: NEGATIVE for significant cough or SOB  CV: NEGATIVE for chest pain, palpitations or varicosities  GI: NEGATIVE for nausea, abdominal pain, heartburn, or change in bowel habits  : NEGATIVE for frequency, dysuria, or hematuria  M: NEGATIVE for significant arthralgias or myalgia  N: NEGATIVE for headache, weakness, dizziness or paresthesias  P: NEGATIVE for changes in mood or affect    PROBLEM LIST  ===============  Patient Active Problem List    Diagnosis Date Noted    Encounter for triage in pregnant patient 2024     Priority: Medium    Nonimmune to hepatitis B virus 2024     Priority: Medium    Vitamin D deficiency 2024     Priority: Medium     12 - recommend supplement 5000IU daily.       HRP (high risk pregnancy), third trimester 2024     Priority: Medium     [facility]  "(S) Patient Provider Group choice: unsure, needs more discussion  Partner's name: Blair  Employment: currently unemployed, was working at [employer].  [x]NOB folder  [x]Dating  [x]Fetal anatomy US ordered  [x] recommended LD ASA after 12 wks for PRE-E risk  [x]COVID vaccine completed  [x]Pap UTD    12-23wks________________________    [x]Rubella immune  []Hep B immune   [x]Varicella inon-mmune  [x]FLU shot did not receive      24-28wk_________________________  [x]EOB folder  [x]Labor plans: Planning epidural.  Wants to hire a , not planning family/FOB in room for birth.  []Prenatal Ed  [x]: Resources given 24  [x]Infant feeding plan Breast and bottle  [x]Shock care provider choice [facility] pedis  [x]PP Contraception plan Planning tubal  [x]TDAP     29-35 wk________________________    []GCT nml results  []RSV    36-37 wks______________________   [] GBS   []OTC PP meds sent  []PP recovery plans/support:  []Planning CS-ERAS pkt    38-42 wks______________________  []IOL reason/plans  []Postdates BPP       High risk social situation 2024     Priority: Medium      This patient is enrolled in the [facility] Program. The program provides patients with support, education, referrals and resources during their pregnancy.     Reason for enrollment: Resources for transportation, food, housing, and \"other\"    For more information, please contact Julio Donovan, [facility] Specialist, at [phone].      History of syphilis 2023     Priority: Medium      Diagnosed 2021 and treated.   2023 reactive positive RPR, confirmation results pending. Patient advised by letter.   Consult w/ID advised verification of treatment w/[place] [facility] [facility]. No call back.   Patient needs Consult w/ID and possible repeat of PCN treatment.  24: PCN #1 given  ?24: Ms. Qiu received one dose of penicillin for treatment of latent syphilis on 2024. She notes that the " injection was very painful and therefore did not complete the three dose series. She shared that she does not believe that she has latent syphilis, as she was treated for this in  with doxycycline, although did not have the follow up RPR after treatment. She would like to have her RPR re-drawn at her next OB visit, and does not wish to proceed with the penicillin course at this time (would require all three doses given length of time since last dose). We discussed the risks of syphilis in pregnancy, including the increased risk for stillbirth and it can also impact the care for baby after delivery and that treatment is recommended. We discussed the option for an ID consultation at [facility] (previously saw Dr. Recinos through [facility]).    surveillance with weekly BPP is recommended to begin next week given the diagnosis of syphilis in pregnancy. We presume the  surveillance will be performed in your office in conjunction with her OB care.  Tonya Plunkett MD  Specialist in Maternal-Fetal Medicine      Susceptible to varicella (non-immune), currently pregnant 2023     Priority: Medium      Patient will need varicella vaccine after delivery.      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        Family history of sudden cardiac death (SCD) 03/15/2017     Priority: Medium    Chest pain, atypical 03/15/2017     Priority: Medium    Atopic dermatitis 2016     Priority: Medium       HISTORIES  ==============  ALLERGIES:      Allergies   Allergen Reactions    Penicillins Rash     Rash all over her body.     Rash all over her body.     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   Started on lexapro 10 mg daily on 2021.  Formatting of this note might be different from the original.   Formatting of this  "note might be different from the original.   Started on lexapro 10 mg daily on 7/2/2021.      Atopic dermatitis 03/08/2016    Chest pain, atypical 03/15/2017    Family history of sudden cardiac death (SCD) 03/15/2017    High risk social situation 01/02/2024    Formatting of this note might be different from the original.   This patient is enrolled in the [employer] Program. The program provides patients with support, education, referrals and resources during their pregnancy.       Reason for enrollment: Resources for transportation, food, housing, and \"other\"      For more information, please contact Julio Donovan, [facility] Spec    History of syphilis 12/28/2023    Formatting of this note might be different from the original.   Diagnosed 12/07/2021 and treated.   12/28/2023 reactive positive RPR, confirmation results pending. Patient advised by letter.   Consult w/ID advised verification of treatment w/[place] [facility]. No call back.   Patient needs Consult w/ID and possible repeat of PCN treatment.       SOCIAL HISTORY  Social History     Socioeconomic History    Marital status: Single     Spouse name: Not on file    Number of children: 0    Years of education: Not on file    Highest education level: Not on file   Occupational History    Occupation: Director of environmental services     Comment: [employer]   Tobacco Use    Smoking status: Former     Types: Cigarettes    Smokeless tobacco: Never   Vaping Use    Vaping status: Never Used   Substance and Sexual Activity    Alcohol use: No     Comment: ocassionaly    Drug use: No    Sexual activity: Not Currently     Partners: Male     Birth control/protection: None   Other Topics Concern    Not on file   Social History Narrative    Not on file     Social Determinants of Health     Financial Resource Strain: High Risk (1/1/2022)    [facility]    " Financial Resource Strain     Difficulty of Paying Living Expenses: Not on file     Difficulty of Paying Living Expenses: Not on file   Food Insecurity: Not on file   Transportation Needs: Not on file   Physical Activity: Not on file   Stress: Not on file   Social Connections: Unknown (2022)    [facility] from [facility], [facility]    Social Connections     Frequency of Communication with Friends and Family: Not on file   Interpersonal Safety: Unknown (2024)    [facility]    Humiliation, Afraid, Rape, and Kick questionnaire     Fear of Current or Ex-Partner: Not on file     Emotionally Abused: Not on file     Physically Abused: No     Sexually Abused: No   Housing Stability: Not on file       FAMILY HISTORY  Family History   Problem Relation Age of Onset    No Known Problems Mother     No Known Problems Father     No Known Problems Sister     No Known Problems Sister     No Known Problems Sister     No Known Problems Sister     No Known Problems Sister     No Known Problems Sister     No Known Problems Sister     No Known Problems Sister     No Known Problems Sister     Hypertension Maternal Grandmother     Diabetes Maternal Grandfather     No Known Problems Paternal Grandmother      OB HISTORY  OB History    Para Term  AB Living   2 0 0 0 1 0   SAB IAB Ectopic Multiple Live Births   0 1 0 0 0      # Outcome Date GA Lbr Maikel/2nd Weight Sex Type Anes PTL Lv   2 Current            1 IAB 2019 5w0d             Birth Comments: Medical      Prenatal Labs:   Lab Results   Component Value Date    HGB 11.0 (L) 2024       ULTRASOUND(s) reviewed: 24 BPP 8/8.     EXAM  ============  /70 (BP Location: Right arm, Patient Position: Semi-Adames's, Cuff Size: Adult Regular)   Temp 98.8  F (37.1  C) (Oral)   Resp 20   LMP 10/28/2023 (Exact Date)   GENERAL APPEARANCE: healthy, alert and  no distress  RESP: lungs clear to auscultation - no rales, rhonchi or wheezes  CV: regular rates and rhythm, normal S1 S2, no S3 or S4 and no murmur,and no varicosities  ABDOMEN:  soft, nontender, no epigastric pain  SKIN: no suspicious lesions or rashes  NEURO: Denies headache, blurred vision, other vision changes  PSYCH: mentation appears normal. and affect normal/bright  MS/ LEGS: Reflexes normal bilaterally    CONTRACTIONS: none   FETAL HEART TONES: continuous EFM- baseline 130 with moderate variability and positive accelerations. No decelerations.  NST: REACTIVE  EFW: 5#    PELVIC EXAM: not indicate  DISCHARGE: clear    ROM: no  ROMPLUS: negative    LABS: GC/ Chlamydia, trich, BV, yeast and RPR done.   Lab results reviewed- No vaginal infections present. RPR pending.     DIAGNOSIS  ============  34w6d seen on the Birthplace Triage for vaginal discharge.   Negative for ROM.   NST: REACTIVE  Fetal Heart rate tracing:category one    PLAN  ============  - Discussed syphilis course and treatment and evaluation. RPR drawn today and will be in contact with pt regarding results.     Call or return to the Birthplace with contractions, cramping, abdominal or pelvic pain, vaginal bleeding, leaking fluid or decreased fetal movement.  Follow up- as scheduled.     LIBERTAD ALDRICH CNM

## 2024-06-28 NOTE — PLAN OF CARE
Goal Outcome Evaluation:  Data: Patient assessed in the Birthplace for leaking vaginal fluid. Cervical exam deferred. Membranes intact. Contractions are not present with some irritability.  See flowsheets for fetal and uterine assessment documentation. Fern and ROM+ came back negative, wet prep, GBS, UA, and Treponema ABS with Reflex to RPR and Titer are collected and sent to the lab.     Action: Presumed adequate fetal oxygenation documented. Discharge instructions reviewed. Patient instructed to report change in fetal movement, vaginal leaking of fluid or bleeding, abdominal pain, or any concerns related to the pregnancy to provider/clinic.      Response: Orders to discharge home per MAHNAZ Lu. Patient verbalized understanding of education and agreement with plan. Discharged to home at 1452.

## 2024-06-28 NOTE — TELEPHONE ENCOUNTER
TC: Attempted to call 27 yo  at 34/5 weeks who called the S RN team reporting leakage of fluid.   She was advised to come to the Birthplace for evaluation but did not present    Attempted  to reach patient to check in, she did not answer her phone. Unable to leave a message      LIBERTAD Plasencia, JULIANM

## 2024-06-28 NOTE — DISCHARGE INSTRUCTIONS
Learning About When to Call Your Doctor During Pregnancy (After 20 Weeks)  Overview  It's common to have concerns about what might be a problem when you're pregnant. Most pregnancies don't have any serious problems. But it's still important to know when to call your doctor if you have certain symptoms or signs of labor.  These are general suggestions. Your doctor may give you some more information about when to call.  When to call your doctor (after 20 weeks)  Call 911  anytime you think you may need emergency care. For example, call if:  You have severe vaginal bleeding. This means you are soaking through a pad each hour for 2 or more hours.  You have sudden, severe pain in your belly.  You have chest pain, are short of breath, or cough up blood.  You passed out (lost consciousness).  You have a seizure.  You see or feel the umbilical cord.  You think you are about to deliver your baby and can't make it safely to the hospital or birthing center.  Call your doctor now or seek immediate medical care if:  You have vaginal bleeding.  You have belly pain.  You have a fever.  You are dizzy or lightheaded, or you feel like you may faint.  You have signs of a blood clot in your leg (called a deep vein thrombosis), such as:  Pain in the calf, back of the knee, thigh, or groin.  Swelling in your leg or groin.  A color change on the leg or groin. The skin may be reddish or purplish, depending on your usual skin color.  You have symptoms of preeclampsia, such as:  Sudden swelling of your face, hands, or feet.  New vision problems (such as dimness, blurring, or seeing spots).  A severe headache.  You have a sudden release of fluid from your vagina. (You think your water broke.)  You've been having regular contractions for an hour. This means that you've had at least 6 contractions within 1 hour, even after you change your position and drink fluids.  You notice that your baby has stopped moving or is moving less than  "normal.  You have signs of heart failure, such as:  New or increased shortness of breath.  New or worse swelling in your legs, ankles, or feet.  Sudden weight gain, such as more than 2 to 3 pounds in a day or 5 pounds in a week.  Feeling so tired or weak that you cannot do your usual activities.  You have symptoms of a urinary tract infection. These may include:  Pain or burning when you urinate.  A frequent need to urinate without being able to pass much urine.  Pain in the flank, which is just below the rib cage and above the waist on either side of the back.  Blood in your urine.  Watch closely for changes in your health, and be sure to contact your doctor if:  You have vaginal discharge that smells bad.  You feel sad, anxious, or hopeless for more than a few days.  You have skin changes, such as a rash, itching, or a yellow color to your skin.  You have other concerns about your pregnancy.  If you have labor signs at 37 weeks or more  If you have signs of labor at 37 weeks or more, your doctor may tell you to call when your labor becomes more active. Symptoms of active labor include:  Contractions that are regular.  Contractions that are less than 5 minutes apart.  Contractions that are hard to talk through.  Follow-up care is a key part of your treatment and safety. Be sure to make and go to all appointments, and call your doctor if you are having problems. It's also a good idea to know your test results and keep a list of the medicines you take.  Where can you learn more?  Go to https://www.[employer].net/patiented  Enter N531 in the search box to learn more about \"Learning About When to Call Your Doctor During Pregnancy (After 20 Weeks).\"  Current as of: July 10, 2023               Content Version: 14.0    2578-1024 [employer].   Care instructions adapted under license by your healthcare professional. If you have questions about a medical condition or this instruction, always ask your healthcare " professional. [employer] disclaims any warranty or liability for your use of this information.

## 2024-06-29 LAB — GP B STREP DNA SPEC QL NAA+PROBE: NEGATIVE

## 2024-06-30 PROBLEM — O32.2XX0 TRANSVERSE LIE OF FETUS: Status: ACTIVE | Noted: 2024-06-30

## 2024-06-30 PROBLEM — O40.3XX0 POLYHYDRAMNIOS IN THIRD TRIMESTER: Status: ACTIVE | Noted: 2024-06-30

## 2024-07-01 LAB
RPR SER QL: REACTIVE
RPR SER-TITR: NORMAL {TITER}
T PALLIDUM AB SER QL: REACTIVE

## 2024-07-03 PROBLEM — Z36.89 ENCOUNTER FOR TRIAGE IN PREGNANT PATIENT: Status: RESOLVED | Noted: 2024-06-28 | Resolved: 2024-07-03

## 2024-07-11 ENCOUNTER — ANCILLARY PROCEDURE (OUTPATIENT)
Dept: ULTRASOUND IMAGING | Facility: CLINIC | Age: 29
End: 2024-07-11
Attending: ADVANCED PRACTICE MIDWIFE
Payer: COMMERCIAL

## 2024-07-11 DIAGNOSIS — O98.119 SYPHILIS IN PREGNANCY, ANTEPARTUM: ICD-10-CM

## 2024-07-11 PROCEDURE — 76819 FETAL BIOPHYS PROFIL W/O NST: CPT

## 2024-07-11 PROCEDURE — 76819 FETAL BIOPHYS PROFIL W/O NST: CPT | Mod: 26 | Performed by: STUDENT IN AN ORGANIZED HEALTH CARE EDUCATION/TRAINING PROGRAM

## 2024-07-14 ENCOUNTER — TELEPHONE (OUTPATIENT)
Dept: OBGYN | Facility: CLINIC | Age: 29
End: 2024-07-14
Payer: COMMERCIAL

## 2024-07-14 NOTE — TELEPHONE ENCOUNTER
Returned call to patient with no response.  Left voicemail for patient return to call.      LIBERTAD Goel CNM

## 2024-07-16 DIAGNOSIS — O21.9 NAUSEA AND VOMITING DURING PREGNANCY: Primary | ICD-10-CM

## 2024-07-16 RX ORDER — ONDANSETRON 4 MG/1
4 TABLET, ORALLY DISINTEGRATING ORAL EVERY 8 HOURS PRN
Qty: 9 TABLET | Refills: 2 | Status: SHIPPED | OUTPATIENT
Start: 2024-07-16 | End: 2024-07-17

## 2024-07-16 NOTE — PATIENT INSTRUCTIONS
"Week 37 of Your Pregnancy: Care Instructions    Most babies are born between 37 and 40 weeks.    This is a good time to pack a bag to take with you to the birth. Then it will be ready to go when you are.    Learn about breastfeeding.  For example, find out about ways to hold your baby to make breastfeeding easier. And think about learning how to pump and store milk.     Know that crying is normal.  It's common for babies to cry 1 to 3 hours a day. Some cry more, and some cry less.     Learn why babies cry.  They may be hungry; have gas; need a diaper change; or feel cold, warm, tired, lonely, or tense. Sometimes they cry for unknown reasons.     Think about what will help you stay calm when your baby cries.  Taking slow, deep breaths can help. So can taking a break. It's okay to put your baby somewhere safe (like their crib) and walk away for a few minutes.     Learn about safe sleep for your baby.  Always put your baby to sleep on their back. Place them alone in a crib or bassinet with a firm, flat surface. Keep soft items like stuffed animals out of the crib.     Learn what to expect with  poop.  Your baby will have their own bowel patterns. Some babies have several bowel movements a day. Some have fewer.     Know that  babies will often have loose, yellow bowel movements.  Formula-fed babies have more formed stools. If your baby's poop looks like pellets, your baby is constipated.   Follow-up care is a key part of your treatment and safety. Be sure to make and go to all appointments, and call your doctor if you are having problems. It's also a good idea to know your test results and keep a list of the medicines you take.  Where can you learn more?  Go to https://www.[employer].net/patiented  Enter N257 in the search box to learn more about \"Week 37 of Your Pregnancy: Care Instructions.\"  Current as of: July 10, 2023               Content Version: 14.0    9841-4839 [employer].   Care " instructions adapted under license by your healthcare professional. If you have questions about a medical condition or this instruction, always ask your healthcare professional. [employer] disclaims any warranty or liability for your use of this information.

## 2024-07-17 ENCOUNTER — ANCILLARY PROCEDURE (OUTPATIENT)
Dept: ULTRASOUND IMAGING | Facility: CLINIC | Age: 29
End: 2024-07-17
Attending: ADVANCED PRACTICE MIDWIFE
Payer: COMMERCIAL

## 2024-07-17 ENCOUNTER — PRENATAL OFFICE VISIT (OUTPATIENT)
Dept: OBGYN | Facility: CLINIC | Age: 29
End: 2024-07-17
Attending: ADVANCED PRACTICE MIDWIFE
Payer: COMMERCIAL

## 2024-07-17 VITALS
HEART RATE: 94 BPM | SYSTOLIC BLOOD PRESSURE: 125 MMHG | BODY MASS INDEX: 38.19 KG/M2 | DIASTOLIC BLOOD PRESSURE: 77 MMHG | WEIGHT: 252 LBS | HEIGHT: 68 IN

## 2024-07-17 DIAGNOSIS — O98.119 SYPHILIS IN PREGNANCY, ANTEPARTUM: ICD-10-CM

## 2024-07-17 DIAGNOSIS — O40.3XX1 POLYHYDRAMNIOS IN THIRD TRIMESTER COMPLICATION, FETUS 1 OF MULTIPLE GESTATION: ICD-10-CM

## 2024-07-17 DIAGNOSIS — B96.89 BV (BACTERIAL VAGINOSIS): ICD-10-CM

## 2024-07-17 DIAGNOSIS — O09.93 HRP (HIGH RISK PREGNANCY), THIRD TRIMESTER: Primary | ICD-10-CM

## 2024-07-17 DIAGNOSIS — N76.0 BV (BACTERIAL VAGINOSIS): ICD-10-CM

## 2024-07-17 PROBLEM — O32.2XX0 TRANSVERSE LIE OF FETUS: Status: RESOLVED | Noted: 2024-06-30 | Resolved: 2024-07-17

## 2024-07-17 PROBLEM — O21.9 NAUSEA AND VOMITING IN PREGNANCY: Status: RESOLVED | Noted: 2024-06-28 | Resolved: 2024-07-17

## 2024-07-17 PROCEDURE — 76819 FETAL BIOPHYS PROFIL W/O NST: CPT

## 2024-07-17 PROCEDURE — 76819 FETAL BIOPHYS PROFIL W/O NST: CPT | Mod: 26 | Performed by: OBSTETRICS & GYNECOLOGY

## 2024-07-17 PROCEDURE — G0463 HOSPITAL OUTPT CLINIC VISIT: HCPCS | Mod: 25 | Performed by: ADVANCED PRACTICE MIDWIFE

## 2024-07-17 PROCEDURE — 99207 PR PRENATAL VISIT: CPT | Performed by: ADVANCED PRACTICE MIDWIFE

## 2024-07-17 RX ORDER — METRONIDAZOLE 500 MG/1
500 TABLET ORAL 2 TIMES DAILY
Qty: 14 TABLET | Refills: 0 | Status: SHIPPED | OUTPATIENT
Start: 2024-07-17 | End: 2024-07-24

## 2024-07-17 ASSESSMENT — PAIN SCALES - GENERAL: PAINLEVEL: MODERATE PAIN (5)

## 2024-07-17 NOTE — PROGRESS NOTES
"Subjective:     28 year old  at 37w4d presents for routine prenatal visit.   No vaginal bleeding or leakage of fluid. Denies contractions or cramping.   Reports regular fetal movement.       No HA, visual changes, RUQ or epigastric pain.       Patient concerns: Feeling some worry about her nutrition and giving her baby enough nutrients.  Reports feeling more hungry and eating more volume of foods. We discussed nutrition choices and adding a little more protein in her diet to see if that helps keep her satisfied, she was glad to hear this plan.  Supported patient for making positive choices.  This worry about her nutrition has been affecting her mood and has been feeling down about it.  Does not feel she needs medication for her mood.  Does not have any thoughts of self harm.  We reviewed mindfulness and guided imagery to support mood and sleep.  Discussed oTC sleep aids to use as needed.      Patient completed BPP today , mild polyhydramnios persists.  NILO 28.3cm.  Patient desires to schedule induction of labor as soon as possible.  IOL scheduled on 24 at 0730 (39weeks).      Patient was seen at [facility] earlier this week, results in care everywhere indicate +BV, patient has not been notified of results and was not treated, consents to treatment.  Rx sent for Flagyll and instructed on use.      We discussed last syphilis titer and information from infectious disease.      Objective:  Vitals:    24 0921   BP: 125/77   Pulse: 94   Weight: 114.3 kg (252 lb)   Height: 1.727 m (5' 7.99\")      See OB flowsheet    Assessment/Plan  Patient Active Problem List    Diagnosis Date Noted    Polyhydramnios in third trimester 2024     Priority: Medium     : NILO 25.6 @ 34w - already getting weekly BPPs       Transverse lie of fetus 2024     Priority: Medium    Nausea and vomiting in pregnancy 2024     Priority: Medium    Nonimmune to hepatitis B virus 2024     Priority: Medium " "   Vitamin D deficiency 2024     Priority: Medium     12 - recommend supplement 5000IU daily.       HRP (high risk pregnancy), third trimester 2024     Priority: Medium     [facility] CNM  Partner's name: Blair  Employment: currently unemployed, was working at [employer].  [x]NOB folder  [x]Dating  [x]Fetal anatomy US ordered  [x] recommended LD ASA after 12 wks for PRE-E risk  [x]COVID vaccine completed  [x]Pap UTD    12-23wks________________________    [x]Rubella immune  []Hep B NON immune   []Varicella NON -mmune  [x]FLU shot did not receive      24-28wk_________________________  [x]EOB folder  [x]Labor plans: Planning epidural.  Wants to hire a , not planning family/FOB in room for birth.  []Prenatal Ed  [x]: Resources given 24  [x]Infant feeding plan Breast and bottle  [x][place] care provider choice [facility] pedis  [x]PP Contraception plan Planning tubal  [x]TDAP     29-35 wk________________________    [x]GCT nml results  []RSV    36-37 wks______________________   [x] GBS negative , repeat   []OTC PP meds sent  []PP recovery plans/support:  []Planning CS-ERAS pkt    38-42 wks______________________  []IOL reason/plans  []Postdates BPP       High risk social situation 2024     Priority: Medium      This patient is enrolled in the [employer] Program. The program provides patients with support, education, referrals and resources during their pregnancy.     Reason for enrollment: Resources for transportation, food, housing, and \"other\"    For more information, please contact Julio Donovan, Healthy Beginnings Specialist, at [phone].      History of syphilis 2023     Priority: Medium     24: Reviewed with Kiarra MILES, concurs with [facility], no further need for additional  screening for this    24 spoke with (Zachariah or David) at [facility] infectious disease .    pt had first positive with 1:32 titer. Was adequately treated " with doxycycline twice, one month apart. Titer then decreased to 1:2 2023, and 1:2 2024. Current titer is 1:2. Documentation at [facility] is consistent with pt having adequate treatment. No suspicion for latent syphilis. No further treatment is needed at this time. Fetus is considered covered/treated and treatment after delivery is not needed.     XIN RUIZ, LIBERTAD CNM    Diagnosed 2021 and treated.2023 reactive positive RPR, confirmation results pending. Patient advised by letter.  Consult w/ID advised verification of treatment w/[place] [facility] [facility]. No call back.   Patient needs Consult w/ID and possible repeat of PCN treatment.  24: PCN #1 given  ?24: Ms. Qiu received one dose of penicillin for treatment of latent syphilis on 2024. She notes that the injection was very painful and therefore did not complete the three dose series. She shared that she does not believe that she has latent syphilis, as she was treated for this in  with doxycycline, although did not have the follow up RPR after treatment. She would like to have her RPR re-drawn at her next OB visit, and does not wish to proceed with the penicillin course at this time (would require all three doses given length of time since last dose). We discussed the risks of syphilis in pregnancy, including the increased risk for stillbirth and it can also impact the care for baby after delivery and that treatment is recommended. We discussed the option for an ID consultation at [facility] (previously saw Dr. Recinos through [facility]).    surveillance with weekly BPP is recommended to begin next week given the diagnosis of syphilis in pregnancy. We presume the  surveillance will be performed in your office in conjunction with her OB care.  Tonya Plunkett MD  Specialist in Maternal-Fetal Medicine        Susceptible to varicella (non-immune), currently pregnant 2023     Priority: Medium       Patient will need varicella vaccine after delivery.      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        Family history of sudden cardiac death (SCD) 03/15/2017     Priority: Medium    Chest pain, atypical 03/15/2017     Priority: Medium    Atopic dermatitis 03/08/2016     Priority: Medium       Discussed plans of labor and birth, updated the individualized prenatal care plan on the problem list  - Reviewed why/how to contact provider if headache/visual changes/RUQ or epigastric pain, decreased fetal movement, vaginal bleeding, leakage of fluid or strong/regular contractions  Return to clinic in 1 week and prn if questions or concerns.   LIBERTAD Montes CNM

## 2024-07-19 ENCOUNTER — ANCILLARY PROCEDURE (OUTPATIENT)
Dept: ULTRASOUND IMAGING | Facility: CLINIC | Age: 29
End: 2024-07-19
Attending: ADVANCED PRACTICE MIDWIFE
Payer: COMMERCIAL

## 2024-07-19 DIAGNOSIS — O98.119 SYPHILIS IN PREGNANCY, ANTEPARTUM: ICD-10-CM

## 2024-07-19 PROCEDURE — 76819 FETAL BIOPHYS PROFIL W/O NST: CPT | Mod: 26 | Performed by: OBSTETRICS & GYNECOLOGY

## 2024-07-19 PROCEDURE — 76819 FETAL BIOPHYS PROFIL W/O NST: CPT

## 2024-07-26 ENCOUNTER — ANCILLARY PROCEDURE (OUTPATIENT)
Dept: ULTRASOUND IMAGING | Facility: CLINIC | Age: 29
End: 2024-07-26
Attending: ADVANCED PRACTICE MIDWIFE
Payer: COMMERCIAL

## 2024-07-26 DIAGNOSIS — O98.119 SYPHILIS IN PREGNANCY, ANTEPARTUM: ICD-10-CM

## 2024-07-26 PROCEDURE — 76819 FETAL BIOPHYS PROFIL W/O NST: CPT

## 2024-07-26 PROCEDURE — 76819 FETAL BIOPHYS PROFIL W/O NST: CPT | Mod: 26 | Performed by: STUDENT IN AN ORGANIZED HEALTH CARE EDUCATION/TRAINING PROGRAM

## 2024-07-27 ENCOUNTER — HOSPITAL ENCOUNTER (INPATIENT)
Facility: CLINIC | Age: 29
LOS: 2 days | Discharge: HOME HEALTH | End: 2024-07-29
Attending: ADVANCED PRACTICE MIDWIFE | Admitting: ADVANCED PRACTICE MIDWIFE
Payer: COMMERCIAL

## 2024-07-27 ENCOUNTER — ANESTHESIA (OUTPATIENT)
Dept: OBGYN | Facility: CLINIC | Age: 29
End: 2024-07-27
Payer: COMMERCIAL

## 2024-07-27 ENCOUNTER — ANESTHESIA EVENT (OUTPATIENT)
Dept: OBGYN | Facility: CLINIC | Age: 29
End: 2024-07-27
Payer: COMMERCIAL

## 2024-07-27 DIAGNOSIS — Z98.891 S/P CESAREAN SECTION: ICD-10-CM

## 2024-07-27 LAB
ABO/RH(D): NORMAL
ANTIBODY SCREEN: NEGATIVE
ERYTHROCYTE [DISTWIDTH] IN BLOOD BY AUTOMATED COUNT: 13.6 % (ref 10–15)
HCT VFR BLD AUTO: 31.8 % (ref 35–47)
HGB BLD-MCNC: 10.6 G/DL (ref 11.7–15.7)
MCH RBC QN AUTO: 28.4 PG (ref 26.5–33)
MCHC RBC AUTO-ENTMCNC: 33.3 G/DL (ref 31.5–36.5)
MCV RBC AUTO: 85 FL (ref 78–100)
PLATELET # BLD AUTO: 221 10E3/UL (ref 150–450)
RBC # BLD AUTO: 3.73 10E6/UL (ref 3.8–5.2)
SPECIMEN EXPIRATION DATE: NORMAL
WBC # BLD AUTO: 5.9 10E3/UL (ref 4–11)

## 2024-07-27 PROCEDURE — 250N000011 HC RX IP 250 OP 636: Performed by: ADVANCED PRACTICE MIDWIFE

## 2024-07-27 PROCEDURE — 258N000003 HC RX IP 258 OP 636: Performed by: ADVANCED PRACTICE MIDWIFE

## 2024-07-27 PROCEDURE — 250N000009 HC RX 250: Performed by: MIDWIFE

## 2024-07-27 PROCEDURE — 85027 COMPLETE CBC AUTOMATED: CPT | Performed by: ADVANCED PRACTICE MIDWIFE

## 2024-07-27 PROCEDURE — 250N000013 HC RX MED GY IP 250 OP 250 PS 637: Performed by: ADVANCED PRACTICE MIDWIFE

## 2024-07-27 PROCEDURE — 59200 INSERT CERVICAL DILATOR: CPT | Performed by: ADVANCED PRACTICE MIDWIFE

## 2024-07-27 PROCEDURE — 250N000011 HC RX IP 250 OP 636: Performed by: STUDENT IN AN ORGANIZED HEALTH CARE EDUCATION/TRAINING PROGRAM

## 2024-07-27 PROCEDURE — 86900 BLOOD TYPING SEROLOGIC ABO: CPT | Performed by: ADVANCED PRACTICE MIDWIFE

## 2024-07-27 PROCEDURE — 59510 CESAREAN DELIVERY: CPT | Performed by: STUDENT IN AN ORGANIZED HEALTH CARE EDUCATION/TRAINING PROGRAM

## 2024-07-27 PROCEDURE — 120N000003 HC R&B IMCU UMMC

## 2024-07-27 RX ORDER — METHYLERGONOVINE MALEATE 0.2 MG/ML
200 INJECTION INTRAVENOUS
Status: DISCONTINUED | OUTPATIENT
Start: 2024-07-27 | End: 2024-07-28

## 2024-07-27 RX ORDER — ONDANSETRON 2 MG/ML
4 INJECTION INTRAMUSCULAR; INTRAVENOUS EVERY 6 HOURS PRN
Status: DISCONTINUED | OUTPATIENT
Start: 2024-07-27 | End: 2024-07-28

## 2024-07-27 RX ORDER — FENTANYL/ROPIVACAINE/NS/PF 2MCG/ML-.1
PLASTIC BAG, INJECTION (ML) EPIDURAL
Status: DISCONTINUED | OUTPATIENT
Start: 2024-07-27 | End: 2024-07-28

## 2024-07-27 RX ORDER — MISOPROSTOL 200 UG/1
800 TABLET ORAL
Status: DISCONTINUED | OUTPATIENT
Start: 2024-07-27 | End: 2024-07-28

## 2024-07-27 RX ORDER — FENTANYL CITRATE-0.9 % NACL/PF 10 MCG/ML
PLASTIC BAG, INJECTION (ML) INTRAVENOUS
Status: DISCONTINUED
Start: 2024-07-27 | End: 2024-07-28 | Stop reason: HOSPADM

## 2024-07-27 RX ORDER — KETOROLAC TROMETHAMINE 30 MG/ML
30 INJECTION, SOLUTION INTRAMUSCULAR; INTRAVENOUS
Status: COMPLETED | OUTPATIENT
Start: 2024-07-27 | End: 2024-07-28

## 2024-07-27 RX ORDER — METOCLOPRAMIDE HYDROCHLORIDE 5 MG/ML
10 INJECTION INTRAMUSCULAR; INTRAVENOUS EVERY 6 HOURS PRN
Status: DISCONTINUED | OUTPATIENT
Start: 2024-07-27 | End: 2024-07-28

## 2024-07-27 RX ORDER — CARBOPROST TROMETHAMINE 250 UG/ML
250 INJECTION, SOLUTION INTRAMUSCULAR
Status: DISCONTINUED | OUTPATIENT
Start: 2024-07-27 | End: 2024-07-28

## 2024-07-27 RX ORDER — FENTANYL CITRATE-0.9 % NACL/PF 10 MCG/ML
100 PLASTIC BAG, INJECTION (ML) INTRAVENOUS EVERY 5 MIN PRN
Status: DISCONTINUED | OUTPATIENT
Start: 2024-07-27 | End: 2024-07-28

## 2024-07-27 RX ORDER — MISOPROSTOL 100 UG/1
25 TABLET ORAL EVERY 4 HOURS PRN
Status: DISCONTINUED | OUTPATIENT
Start: 2024-07-27 | End: 2024-07-27

## 2024-07-27 RX ORDER — LIDOCAINE 40 MG/G
CREAM TOPICAL
Status: DISCONTINUED | OUTPATIENT
Start: 2024-07-27 | End: 2024-07-28

## 2024-07-27 RX ORDER — NALOXONE HYDROCHLORIDE 0.4 MG/ML
0.4 INJECTION, SOLUTION INTRAMUSCULAR; INTRAVENOUS; SUBCUTANEOUS
Status: DISCONTINUED | OUTPATIENT
Start: 2024-07-27 | End: 2024-07-28

## 2024-07-27 RX ORDER — OXYTOCIN/0.9 % SODIUM CHLORIDE 30/500 ML
340 PLASTIC BAG, INJECTION (ML) INTRAVENOUS CONTINUOUS PRN
Status: DISCONTINUED | OUTPATIENT
Start: 2024-07-27 | End: 2024-07-28

## 2024-07-27 RX ORDER — LOPERAMIDE HCL 2 MG
2 CAPSULE ORAL
Status: DISCONTINUED | OUTPATIENT
Start: 2024-07-27 | End: 2024-07-28

## 2024-07-27 RX ORDER — CITRIC ACID/SODIUM CITRATE 334-500MG
30 SOLUTION, ORAL ORAL
Status: DISCONTINUED | OUTPATIENT
Start: 2024-07-27 | End: 2024-07-28

## 2024-07-27 RX ORDER — ACETAMINOPHEN 325 MG/1
650 TABLET ORAL EVERY 4 HOURS PRN
Status: DISCONTINUED | OUTPATIENT
Start: 2024-07-27 | End: 2024-07-28

## 2024-07-27 RX ORDER — OXYTOCIN 10 [USP'U]/ML
10 INJECTION, SOLUTION INTRAMUSCULAR; INTRAVENOUS
Status: DISCONTINUED | OUTPATIENT
Start: 2024-07-27 | End: 2024-07-28

## 2024-07-27 RX ORDER — MISOPROSTOL 200 UG/1
400 TABLET ORAL
Status: DISCONTINUED | OUTPATIENT
Start: 2024-07-27 | End: 2024-07-28

## 2024-07-27 RX ORDER — MORPHINE SULFATE 10 MG/ML
10 INJECTION, SOLUTION INTRAMUSCULAR; INTRAVENOUS
Status: DISCONTINUED | OUTPATIENT
Start: 2024-07-27 | End: 2024-07-28

## 2024-07-27 RX ORDER — SODIUM CHLORIDE, SODIUM LACTATE, POTASSIUM CHLORIDE, CALCIUM CHLORIDE 600; 310; 30; 20 MG/100ML; MG/100ML; MG/100ML; MG/100ML
INJECTION, SOLUTION INTRAVENOUS CONTINUOUS
Status: DISCONTINUED | OUTPATIENT
Start: 2024-07-27 | End: 2024-07-28

## 2024-07-27 RX ORDER — LOPERAMIDE HCL 2 MG
4 CAPSULE ORAL
Status: DISCONTINUED | OUTPATIENT
Start: 2024-07-27 | End: 2024-07-28

## 2024-07-27 RX ORDER — FENTANYL CITRATE 50 UG/ML
100 INJECTION, SOLUTION INTRAMUSCULAR; INTRAVENOUS
Status: DISCONTINUED | OUTPATIENT
Start: 2024-07-27 | End: 2024-07-28

## 2024-07-27 RX ORDER — ONDANSETRON 4 MG/1
4 TABLET, ORALLY DISINTEGRATING ORAL EVERY 6 HOURS PRN
Status: DISCONTINUED | OUTPATIENT
Start: 2024-07-27 | End: 2024-07-28

## 2024-07-27 RX ORDER — SODIUM CHLORIDE, SODIUM LACTATE, POTASSIUM CHLORIDE, CALCIUM CHLORIDE 600; 310; 30; 20 MG/100ML; MG/100ML; MG/100ML; MG/100ML
INJECTION, SOLUTION INTRAVENOUS CONTINUOUS PRN
Status: DISCONTINUED | OUTPATIENT
Start: 2024-07-27 | End: 2024-07-28

## 2024-07-27 RX ORDER — PROCHLORPERAZINE MALEATE 10 MG
10 TABLET ORAL EVERY 6 HOURS PRN
Status: DISCONTINUED | OUTPATIENT
Start: 2024-07-27 | End: 2024-07-28

## 2024-07-27 RX ORDER — OXYTOCIN/0.9 % SODIUM CHLORIDE 30/500 ML
1-24 PLASTIC BAG, INJECTION (ML) INTRAVENOUS CONTINUOUS
Status: DISCONTINUED | OUTPATIENT
Start: 2024-07-27 | End: 2024-07-28

## 2024-07-27 RX ORDER — IBUPROFEN 800 MG/1
800 TABLET, FILM COATED ORAL
Status: COMPLETED | OUTPATIENT
Start: 2024-07-27 | End: 2024-07-28

## 2024-07-27 RX ORDER — METOCLOPRAMIDE 10 MG/1
10 TABLET ORAL EVERY 6 HOURS PRN
Status: DISCONTINUED | OUTPATIENT
Start: 2024-07-27 | End: 2024-07-28

## 2024-07-27 RX ORDER — TRANEXAMIC ACID 10 MG/ML
1 INJECTION, SOLUTION INTRAVENOUS EVERY 30 MIN PRN
Status: DISCONTINUED | OUTPATIENT
Start: 2024-07-27 | End: 2024-07-28

## 2024-07-27 RX ORDER — NALOXONE HYDROCHLORIDE 0.4 MG/ML
0.2 INJECTION, SOLUTION INTRAMUSCULAR; INTRAVENOUS; SUBCUTANEOUS
Status: DISCONTINUED | OUTPATIENT
Start: 2024-07-27 | End: 2024-07-28

## 2024-07-27 RX ORDER — OXYTOCIN/0.9 % SODIUM CHLORIDE 30/500 ML
100-340 PLASTIC BAG, INJECTION (ML) INTRAVENOUS CONTINUOUS PRN
Status: DISCONTINUED | OUTPATIENT
Start: 2024-07-27 | End: 2024-07-28

## 2024-07-27 RX ORDER — FENTANYL/ROPIVACAINE/NS/PF 2MCG/ML-.1
PLASTIC BAG, INJECTION (ML) EPIDURAL
Status: COMPLETED
Start: 2024-07-27 | End: 2024-07-27

## 2024-07-27 RX ORDER — PROCHLORPERAZINE 25 MG
25 SUPPOSITORY, RECTAL RECTAL EVERY 12 HOURS PRN
Status: DISCONTINUED | OUTPATIENT
Start: 2024-07-27 | End: 2024-07-28

## 2024-07-27 RX ORDER — HYDROXYZINE HYDROCHLORIDE 50 MG/1
100 TABLET, FILM COATED ORAL
Status: DISCONTINUED | OUTPATIENT
Start: 2024-07-27 | End: 2024-07-28

## 2024-07-27 RX ADMIN — Medication 2 MILLI-UNITS/MIN: at 22:17

## 2024-07-27 RX ADMIN — HYDROXYZINE HYDROCHLORIDE 100 MG: 50 TABLET, FILM COATED ORAL at 20:29

## 2024-07-27 RX ADMIN — Medication 10 ML: at 18:52

## 2024-07-27 RX ADMIN — FENTANYL CITRATE 100 MCG: 50 INJECTION INTRAMUSCULAR; INTRAVENOUS at 16:04

## 2024-07-27 RX ADMIN — Medication 25 MCG: at 09:37

## 2024-07-27 RX ADMIN — ONDANSETRON 4 MG: 2 INJECTION INTRAMUSCULAR; INTRAVENOUS at 19:54

## 2024-07-27 RX ADMIN — SODIUM CHLORIDE, POTASSIUM CHLORIDE, SODIUM LACTATE AND CALCIUM CHLORIDE 1000 ML: 600; 310; 30; 20 INJECTION, SOLUTION INTRAVENOUS at 18:09

## 2024-07-27 RX ADMIN — Medication 6 ML/HR: at 18:56

## 2024-07-27 RX ADMIN — SODIUM CHLORIDE, POTASSIUM CHLORIDE, SODIUM LACTATE AND CALCIUM CHLORIDE: 600; 310; 30; 20 INJECTION, SOLUTION INTRAVENOUS at 19:09

## 2024-07-27 ASSESSMENT — ACTIVITIES OF DAILY LIVING (ADL)
ADLS_ACUITY_SCORE: 18

## 2024-07-27 ASSESSMENT — ENCOUNTER SYMPTOMS: DYSRHYTHMIAS: 0

## 2024-07-27 NOTE — H&P
"ADMIT NOTE  =================  39w0d    Anna Qiu is a 28 year old female with an Patient's last menstrual period was 10/28/2023 (exact date). and Estimated Date of Delivery: Aug 3, 2024 is [facility] to the [facility] on 2024 at 8:10 AM for induction of labor.  Indication: mild polyhydramnios.     HPI  ================  Last NILO noted to be 27.4.  Patient states that she is feeling well, ready to proceed with induction.  Denies fever, cough, SOB or chest pain. Denies having contact with anyone who is Covid-19 positive. Pt has had Covid-19 Vaccinations.   Contractions- not felt by patient  Fetal movement- active  ROM- no.  Vaginal bleeding- none  GBS- negative  FOB- is involved, Blair, not currently present  Other labor support- sister at bedside.    Weight gain- 252 - 205 lbs, Total weight gain- 47 lbs  Height- 67\"  BMI- 31  First prenatal visit at 9 weeks, Total visits- 5    PROBLEM LIST  =================  Patient Active Problem List    Diagnosis Date Noted    Labor and delivery, indication for care 2024     Priority: Medium    Polyhydramnios in third trimester 2024     Priority: Medium     : NILO 25.6 @ 34w - already getting weekly BPPs       Nonimmune to hepatitis B virus 2024     Priority: Medium    Vitamin D deficiency 2024     Priority: Medium     12 - recommend supplement 5000IU daily.       HRP (high risk pregnancy), third trimester 2024     Priority: Medium     [facility] CNM  Partner's name: Blair  Employment: currently unemployed, was working at [employer].  [x]NOB folder  [x]Dating  [x]Fetal anatomy US ordered  [x] recommended LD ASA after 12 wks for PRE-E risk  [x]COVID vaccine completed  [x]Pap UTD    12-23wks________________________    [x]Rubella immune  []Hep B NON immune   []Varicella NON -mmune  [x]FLU shot did not receive      24-28wk_________________________  [x]EOB folder  [x]Labor plans: Planning epidural.  Wants to hire a " ", not planning family/FOB in room for birth.  []Prenatal Ed  [x]: Resources given 24  [x]Infant feeding plan Breast and bottle  [x] care provider choice [facility] pedis  [x]PP Contraception plan Planning tubal  [x]TDAP     29-35 wk________________________    [x]GCT nml results  []RSV    36-37 wks______________________   [x] GBS negative , repeat   []OTC PP meds sent  []PP recovery plans/support:  []Planning CS-ERAS pkt    38-42 wks______________________  []IOL reason/plans  []Postdates BPP       High risk social situation 2024     Priority: Medium      This patient is enrolled in the Healthy Beginnings Program. The program provides patients with support, education, referrals and resources during their pregnancy.     Reason for enrollment: Resources for transportation, food, housing, and \"other\"    For more information, please contact Julio Donovan, [employer] Specialist, at [phone].      History of syphilis 2023     Priority: Medium     24: Reviewed with Kiarra MILES, concurs with [facility], no further need for additional  screening for this    24 spoke with (Zachariah or David) at [facility] infectious disease .    pt had first positive with 1:32 titer. Was adequately treated with doxycycline twice, one month apart. Titer then decreased to 1:2 2023, and 1:2 2024. Current titer is 1:2. Documentation at [facility] is consistent with pt having adequate treatment. No suspicion for latent syphilis. No further treatment is needed at this time. Fetus is considered covered/treated and treatment after delivery is not needed.     LIBERTAD ALDRICH CNM    Diagnosed 2021 and treated.2023 reactive positive RPR, confirmation results pending. Patient advised by letter.  Consult w/ID advised verification of treatment w/[place] [facility] [facility]. No call back.   Patient needs Consult w/ID and possible repeat of PCN treatment.  24: PCN #1 given  ?24: " Ms. Qiu received one dose of penicillin for treatment of latent syphilis on 2024. She notes that the injection was very painful and therefore did not complete the three dose series. She shared that she does not believe that she has latent syphilis, as she was treated for this in  with doxycycline, although did not have the follow up RPR after treatment. She would like to have her RPR re-drawn at her next OB visit, and does not wish to proceed with the penicillin course at this time (would require all three doses given length of time since last dose). We discussed the risks of syphilis in pregnancy, including the increased risk for stillbirth and it can also impact the care for baby after delivery and that treatment is recommended. We discussed the option for an ID consultation at [facility] (previously saw Dr. Recinos through [facility]).    surveillance with weekly BPP is recommended to begin next week given the diagnosis of syphilis in pregnancy. We presume the  surveillance will be performed in your office in conjunction with her OB care.  Tonya Plunkett MD  Specialist in Maternal-Fetal Medicine        Susceptible to varicella (non-immune), currently pregnant 2023     Priority: Medium      Patient will need varicella vaccine after delivery.      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        Family history of sudden cardiac death (SCD) 03/15/2017     Priority: Medium    Chest pain, atypical 03/15/2017     Priority: Medium    Atopic dermatitis 2016     Priority: Medium       HISTORIES  ============  Allergies   Allergen Reactions    Penicillins Rash     Rash all over her body.     Rash all over her body.     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   Started on lexapro 10 mg daily on 2021.  Formatting  "of this note might be different from the original.   Formatting of this note might be different from the original.   Started on lexapro 10 mg daily on 2021.      Atopic dermatitis 2016    Chest pain, atypical 03/15/2017    Family history of sudden cardiac death (SCD) 03/15/2017    High risk social situation 2024    Formatting of this note might be different from the original.   This patient is enrolled in the Healthy Beginnings Program. The program provides patients with support, education, referrals and resources during their pregnancy.       Reason for enrollment: Resources for transportation, food, housing, and \"other\"      For more information, please contact Julio Donovan, Healthy Beginnings Spec    History of syphilis 2023    Formatting of this note might be different from the original.   Diagnosed 2021 and treated.   2023 reactive positive RPR, confirmation results pending. Patient advised by letter.   Consult w/ID advised verification of treatment w/[place] [facility] [facility]. No call back.   Patient needs Consult w/ID and possible repeat of PCN treatment.       No past surgical history on file..  Family History   Problem Relation Age of Onset    No Known Problems Mother     No Known Problems Father     No Known Problems Sister     No Known Problems Sister     No Known Problems Sister     No Known Problems Sister     No Known Problems Sister     No Known Problems Sister     No Known Problems Sister     No Known Problems Sister     No Known Problems Sister     Hypertension Maternal Grandmother     Diabetes Maternal Grandfather     No Known Problems Paternal Grandmother      Social History     Tobacco Use    Smoking status: Former     Types: Cigarettes    Smokeless tobacco: Never   Substance Use Topics    Alcohol use: No     Comment: ocassionaly     OB History    Para Term  AB Living   2 0 0 0 1 0   SAB IAB Ectopic Multiple Live Births   0 1 0 0 0      # Outcome " Date GA Lbr Maikel/2nd Weight Sex Type Anes PTL Lv   2 Current            1 IAB 2019 5w0d             Birth Comments: Medical         LABS:   ===========  Prenatal Labs:  Rhogam not indicated   Lab Results   Component Value Date    HGB 11.0 (L) 2024    GLU1 72 2024     Rubella immune  GBS Negative  Varicella NON-immune  Other labs:       Results for orders placed or performed in visit on 24 (from the past 24 hour(s))   US OB Fetal Biophys Prf wo NonStrs Singls Sgl    Narrative    Obstetrical Ultrasound Report   OB U/S - Biophysical Profile & NILO - Transabdominal   [facility]   Referring physician: Lesley Pitts CNM   Sonographer: Kelly Brice RDMS   Indication:  Syphilis in pregnancy     Dating (mm/dd/yyyy): LMP: Patient's last menstrual period was 10/28/2023   (exact date).    EDC:  Estimated Date of Delivery: Aug 3, 2024   GA by   LMP:  38w6d       Anatomy Scan:   Bateman gestation.   Fetal heart activity: Rate and rhythm is within normal limits.  Fetal   heart rate: 132bpm   Fetal presentation: Cephalic   Placenta: Anterior   Amniotic fluid: NILO 27.4     Biophysical Profile:   Fetal body movements: Normal (2)   Fetal tone: Normal (2)   Fetal breathing movements: Normal (2)   Amniotic fluid volume: Normal (2)   BPP Score: 8/8   Technique: Transabdominal Imaging performed     Impression:   Cephalic fetal presentation  Mild polyhydramnios with NILO 27.  BPP 8/8    IOL scheduled tomorrow.     Demi Fuentes MD         ROS  =========  Pt denies significant respiratory, cardiovacular, GI, or muscular/skeletalcomplaints.    See RN data base ROS.       PHYSICAL EXAM:  ===============  LMP 10/28/2023 (Exact Date)   General appearance: comfortable  GENERAL APPEARANCE: healthy, alert and no distress  RESP: lungs clear to auscultation - no rales, rhonchi or wheezes  CV: regular rates and rhythm, normal S1 S2, no S3 or S4 and no murmur,and no varicosities  ABDOMEN:   soft, nontender, no epigastric pain  SKIN: no suspicious lesions or rashes  NEURO: Denies headache, blurred vision, other vision changes  PSYCH: mentation appears normal. and affect normal/bright  Legs: Reflexes normal bilaterally and No edema     Abdomen: gravid, vertex fetus per Leopold's, non-tender between contractions. Confirmed by BSUS  EFW-  7.5 lbs.   CONTRACTIONS: irreg and not felt by patient  FETAL HEART TONES: continuous EFM- baseline 130 with moderate variability and positive accelerations. No decelerations.  PELVIC EXAM: closed/ 50%/ Posterior/ firm/ -2   PARSONS SCORE: 2        ASSESSMENT:  ==============  IUP @ 39w0d admitted for induction of labor.  Indication: mild polyhydramnios   NST REACTIVE  Fetal Heart Tones - category one  GBS- negative  Patient Active Problem List   Diagnosis    High risk social situation    Family history of sudden cardiac death (SCD)    Chest pain, atypical    Atopic dermatitis    Anxious depression    History of syphilis    Susceptible to varicella (non-immune), currently pregnant    HRP (high risk pregnancy), third trimester    Nonimmune to hepatitis B virus    Vitamin D deficiency    Polyhydramnios in third trimester    Labor and delivery, indication for care       PLAN:  ===========  Admit - see IP orders, consents to type and screen and CBC.    pain medication options of nitrous oxide, fentanyl IV and epidural anesthesia reviewed with pt. Pt is interested in epidural.  Anesthesia given report during safety rounds.    Patient had previously planned tubal ligation, consent signed 24.    Reviewed unfavorable cervix and R/B to oral and vaginal Misoprostol and bianchi balloon.  Patient desires to proceed with vaginal Misoprostol, consider Bianchi placement after 1-2 doses of Misoprostol.    Discussed therapeutic rest for the night, orders placed.  All questions answered at this time.  Anticipate   LIBERTAD Montes CNM    Medically Ready for Discharge:  Anticipated in 2-4 Days   Addendum 0940:  First dose of vaginal misoprostol placed by this writer at this time.  Patient tolerated placement well.  LIBERTAD Montes CNM

## 2024-07-27 NOTE — PROGRESS NOTES
Labor Progress Note    SUBJECTIVE:    Anna Qiu  Estimated Date of Delivery: Aug 3, 2024  Patient is noticing some cramping, noted increased discharge when up to void, no leaking of amniotic fluid.    Consents to cervical exam at this time and open to Huber balloon placement due to too frequent contractions for additional misoprostol at this time.          OBJECTIVE:  VITALS  Blood pressure 133/76, temperature 97.6  F (36.4  C), temperature source Oral, resp. rate 18, last menstrual period 10/28/2023, not currently breastfeeding.  Patient Vitals for the past 24 hrs:   BP Temp Temp src Resp   24 1320 133/76 97.6  F (36.4  C) Oral 18   24 0800 123/67 97.7  F (36.5  C) Oral 16       FETAL HEART RATE ASSESSMENT:  Baseline rate 135, normal  Variability moderate  Accelerations present   Decelerations not present     EFM interpretation suggests absence of concern for fetal metabolic acidemia at this time due to accelerations present, decelerations absent, heart rate: normal baseline, and variability: moderate    CONTRACTIONS: Contractions every 1-2 minutes.  Palpate: mild and cramping    Pelvic exam: 1/ 50%/ Posterior/ average/ -2 Huber balloon placed in standard fashion using sterile technique, placement confirmed with digital exam.  Balloon inflated to 75cc  Membrane status: not ruptured  Pitocin- none,  Antibiotics- none  Labor course:  Misoprostol #1 at 0940  Huber balloon placed at 1340    ASSESSMENT:    Anna Qiu  28 year old  female  Estimated Date of Delivery: Aug 3, 2024  IUP @ 39w0d IOL for mild polyhydramnios.   Patient Active Problem List   Diagnosis    High risk social situation    Family history of sudden cardiac death (SCD)    Chest pain, atypical    Atopic dermatitis    Anxious depression    History of syphilis    Susceptible to varicella (non-immune), currently pregnant    HRP (high risk pregnancy), third trimester    Nonimmune to hepatitis B virus    Vitamin D  deficiency    Polyhydramnios in third trimester    Labor and delivery, indication for care       Fetal Heart Rate Tracing category one     GBS- negative      PLAN:  ==========  - Consider additional Misoprostol if contractions space  -Continue frequent position changes  -Continue labor support  -Re-evaluate in 2-4 hours and as needed if status changes  -Anticipate vaginal delivery    Shelia Comer, LIBERTAD JORDANM

## 2024-07-27 NOTE — PROGRESS NOTES
S;General appearance: sleepy  comfortable after epidural placement   Support: family at home  pt plans to call in later        Blood pressure 137/83, temperature 97.4  F (36.3  C), temperature source Oral, resp. rate 16, last menstrual period 10/28/2023, not currently breastfeeding.  Bianchi balloon expelled at    Baseline rate 120, normal  Variability moderate  Accelerations present   Decelerations not present      CONTRACTIONS: Contractions every 2-3 minutes.  Palpate: mild  Pitocin- none,  Antibiotics- none    ROM: not ruptured  PELVIC EXAM:/-3  not well applied vtx   # Pain Assessment:      2024     4:10 PM   Current Pain Score   Patient currently in pain? yes         Assessment: EFM interpretation suggests absence of concern for fetal metabolic acidemia at this time due to accelerations present, heart rate: normal baseline, and variability: moderate    Labor course:  Misoprostol #1 at 0940  Bianchi balloon placed at 1340  Bianchi out at     ASSESSMENT:  ==============  Anna Qiu  28 year old  female  Estimated Date of Delivery: Aug 3, 2024   IUP @ 39w0d IOL for mild polyhydramnios   Fetal Heart rate tracing  category one   GBS- negative    Patient Active Problem List   Diagnosis    High risk social situation    Family history of sudden cardiac death (SCD)    Chest pain, atypical    Atopic dermatitis    Anxious depression    History of syphilis    Susceptible to varicella (non-immune), currently pregnant    HRP (high risk pregnancy), third trimester    Nonimmune to hepatitis B virus    Vitamin D deficiency    Polyhydramnios in third trimester    Labor and delivery, indication for care        PLAN:  ===========   cervical bianchi bulb expelled  Plan IV pitocin augmentation when contractions space   Pain medication epidural   Anticipate   MD consultant on call / available prn  reevaluate in 2-4 hours/PRN     LIBERTAD Stockton CNM

## 2024-07-27 NOTE — PROVIDER NOTIFICATION
07/27/24 1200   Provider Notification   Provider Name/Title Shelia Comer CNM   Method of Notification In Department   Request Evaluate - Remote   Notification Reason Uterine Activity       Shelia notified that patient is having high frequency, low amplitude contractions. Still category I fetal tracing. Plan to continue to monitor at this time.

## 2024-07-27 NOTE — ANESTHESIA PREPROCEDURE EVALUATION
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   Started on lexapro 10 mg daily on 2021.  Formatting of this note might be different from the original.   Formatting of this note might be different from the original.   Started on lexapro 10 mg daily on 2021.      Atopic dermatitis 2016    Chest pain, atypical 03/15/2017    Family history of sudden cardiac death (SCD) 03/15/2017    High risk social situation 2024    Formatting of this note might be different from the original.   This patient is enrolled in the [employer] [facility]. The program provides patients with support, education, referrals and resources during their pregnancy.       Reason for enrollment: Resources for transportation, food, housing, and \"other\"      For more information, please contact Julio Donovan, [facility]    History of syphilis 2023    Formatting of this note might be different from the original.   Diagnosed 2021 and treated.   2023 reactive positive RPR, confirmation results pending. Patient advised by letter.   Consult w/ID advised verification of treatment w/[place] [facility]. No call back.   Patient needs Consult w/ID and possible repeat of PCN treatment.        No past surgical history on file.   Allergies   Allergen Reactions    Penicillins Rash     Rash all over her body.     Rash all over her body.      Social History     Tobacco Use    Smoking status: Former     Types: Cigarettes    Smokeless tobacco: Never   Substance Use Topics    Alcohol use: No     Comment: ocassionaly      Wt Readings from Last 1 Encounters:   24 114.3 kg (252 lb)        Anesthesia Evaluation            ROS/MED HX  ENT/Pulmonary:     (+)     SHAHRIAR risk factors,                "                 (-) asthma   Neurologic:  - neg neurologic ROS     Cardiovascular: Comment: Reported family hx of sudden cardiac death  [facility] included TTE and Holter monitoring - normal findings - neg cardiovascular ROS   (+)  - -   -  - -                                 Previous cardiac testing   Echo: Date: 2017 Results:  Normal LV size, wall thickness, and systolic function.     Normal RV size and function.      Left Ventricular Ejection Fraction: 65 %     Stress Test:  Date: Results:    ECG Reviewed:  Date: Results:    Cath:  Date: Results:   (-) PIH, GALVEZ and arrhythmias   METS/Exercise Tolerance: >4 METS    Hematologic:  - neg hematologic  ROS   (+)  no anticoagulation therapy,  no thrombocytopenia,            Musculoskeletal:   (+)         lumbar spine      GI/Hepatic:     (+) GERD,                   Renal/Genitourinary:       Endo:     (+)               Obesity,       Psychiatric/Substance Use:       Infectious Disease:       Malignancy:       Other: Comment: 29 yo otherwise healthy  at 39w0d presenting for IOL for mild polyhydramnios    (-) previous        Physical Exam    Airway        Mallampati: III   TM distance: > 3 FB   Neck ROM: full   Mouth opening: > 3 cm    Respiratory Devices and Support         Dental           Cardiovascular   cardiovascular exam normal          Pulmonary   pulmonary exam normal                OUTSIDE LABS:  CBC:   Lab Results   Component Value Date    WBC 5.9 2024    WBC 7.8 2024    HGB 10.6 (L) 2024    HGB 11.0 (L) 2024    HCT 31.8 (L) 2024    HCT 33.9 (L) 2024     2024     2024     BMP:   Lab Results   Component Value Date     2022     2019    POTASSIUM 3.4 2022    POTASSIUM 3.5 2019    CHLORIDE 103 2022    CHLORIDE 109 2019    CO2 25 2022    CO2 25 2019    BUN 6 (L) 2022    BUN 9  "03/06/2019    CR 0.86 05/30/2022    CR 0.83 03/06/2019    GLC 98 05/30/2022     (H) 03/06/2019     COAGS: No results found for: \"PTT\", \"INR\", \"FIBR\"  POC:   Lab Results   Component Value Date    HCG Negative 02/27/2021     HEPATIC:   Lab Results   Component Value Date    ALBUMIN 3.5 03/06/2019    PROTTOTAL 8.0 03/06/2019    ALT 20 03/06/2019    AST 11 03/06/2019    ALKPHOS 87 03/06/2019    BILITOTAL 0.3 03/06/2019     OTHER:   Lab Results   Component Value Date    CHANTELLE 8.7 05/30/2022    LIPASE 74 03/06/2019       Anesthesia Plan    ASA Status:  2       Anesthesia Type: Epidural.              Consents    Anesthesia Plan(s) and associated risks, benefits, and realistic alternatives discussed. Questions answered and patient/representative(s) expressed understanding.     - Discussed:     - Discussed with:  Patient            Postoperative Care            Comments:    Other Comments: Anna Qiu is an otherwise healthy 28 year old F presenting for IOL indicated for mild polyhydramnios, requesting an epidural for labor analgesia. Her past obstetric history includes latent syphilis treated during pregnancy.  PMHx significant for obesity. Plan for epidural/DPE with standard ASA monitors. We discussed the risks and benefits of neuraxial analgesia and/or anesthesia including but not limited to: post dural puncture headache, infection, epidural hematoma, damage to nearby nerves/structures, failed or patchy epidural requiring replacement or conversion to general anesthesia in an emergent setting. Anna Qiu verbalized understanding of these risks. All questions were answered.                Elizabeth Cabrera MD    I have reviewed the pertinent notes and labs in the chart from the past 30 days and (re)examined the patient.  Any updates or changes from those notes are reflected in this note.                  "

## 2024-07-27 NOTE — PLAN OF CARE
"Data: Patient admitted to room 479 at 0745. Patient is a . Prenatal record reviewed.   OB History    Para Term  AB Living   2 0 0 0 1 0   SAB IAB Ectopic Multiple Live Births   0 1 0 0 0      # Outcome Date GA Lbr Maikel/2nd Weight Sex Type Anes PTL Lv   2 Current            1 IAB 2019 5w0d             Birth Comments: Medical    .  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   Started on lexapro 10 mg daily on 2021.  Formatting of this note might be different from the original.   Formatting of this note might be different from the original.   Started on lexapro 10 mg daily on 2021.      Atopic dermatitis 2016    Chest pain, atypical 03/15/2017    Family history of sudden cardiac death (SCD) 03/15/2017    High risk social situation 2024    Formatting of this note might be different from the original.   This patient is enrolled in the [employer] [facility]. The program provides patients with support, education, referrals and resources during their pregnancy.       Reason for enrollment: Resources for transportation, food, housing, and \"other\"      For more information, please contact Julio Donovan, [facility]    History of syphilis 2023    Formatting of this note might be different from the original.   Diagnosed 2021 and treated.   2023 reactive positive RPR, confirmation results pending. Patient advised by letter.   Consult w/ID advised verification of treatment w/[place] [facility]. No call back.   Patient needs Consult w/ID and possible repeat of PCN treatment.     .  Gestational age 39w0d. Vital signs per doc flowsheet. Fetal movement present. Patient reports Induction Of Labor   as reason for admission. Support persons are present.  Action: Verbal consent for EFM, external fetal monitors applied. Admission " assessment completed. Patient and support persons educated on labor process. Patient instructed to report change in fetal movement, contractions, vaginal leaking of fluid or bleeding, abdominal pain, or any concerns related to the pregnancy to her nurse/physician. Patient oriented to room, call light in reach.   Response: DENZEL Comer CNM informed of arrival. Plan per provider is IOL with vaginal cytotec. Patient verbalized understanding of education and verbalized agreement with plan. Patient coping with labor via distraction and rest.

## 2024-07-28 PROBLEM — O13.3 GESTATIONAL HYPERTENSION, THIRD TRIMESTER: Status: ACTIVE | Noted: 2024-07-28

## 2024-07-28 LAB
ALBUMIN MFR UR ELPH: 17 MG/DL
ALBUMIN SERPL BCG-MCNC: 3.1 G/DL (ref 3.5–5.2)
ALP SERPL-CCNC: 145 U/L (ref 40–150)
ALT SERPL W P-5'-P-CCNC: 6 U/L (ref 0–50)
ANION GAP SERPL CALCULATED.3IONS-SCNC: 12 MMOL/L (ref 7–15)
AST SERPL W P-5'-P-CCNC: 12 U/L (ref 0–45)
BASOPHILS # BLD AUTO: 0 10E3/UL (ref 0–0.2)
BASOPHILS NFR BLD AUTO: 0 %
BILIRUB SERPL-MCNC: 0.6 MG/DL
BUN SERPL-MCNC: 5.4 MG/DL (ref 6–20)
CALCIUM SERPL-MCNC: 9.3 MG/DL (ref 8.8–10.4)
CHLORIDE SERPL-SCNC: 104 MMOL/L (ref 98–107)
CREAT SERPL-MCNC: 0.79 MG/DL (ref 0.51–0.95)
CREAT UR-MCNC: 148 MG/DL
EGFRCR SERPLBLD CKD-EPI 2021: >90 ML/MIN/1.73M2
EOSINOPHIL # BLD AUTO: 0 10E3/UL (ref 0–0.7)
EOSINOPHIL NFR BLD AUTO: 0 %
ERYTHROCYTE [DISTWIDTH] IN BLOOD BY AUTOMATED COUNT: 13.6 % (ref 10–15)
GLUCOSE SERPL-MCNC: 69 MG/DL (ref 70–99)
HCO3 SERPL-SCNC: 22 MMOL/L (ref 22–29)
HCT VFR BLD AUTO: 35.2 % (ref 35–47)
HGB BLD-MCNC: 11.7 G/DL (ref 11.7–15.7)
HOLD SPECIMEN: NORMAL
IMM GRANULOCYTES # BLD: 0 10E3/UL
IMM GRANULOCYTES NFR BLD: 0 %
LYMPHOCYTES # BLD AUTO: 1.1 10E3/UL (ref 0.8–5.3)
LYMPHOCYTES NFR BLD AUTO: 11 %
MCH RBC QN AUTO: 28.5 PG (ref 26.5–33)
MCHC RBC AUTO-ENTMCNC: 33.2 G/DL (ref 31.5–36.5)
MCV RBC AUTO: 86 FL (ref 78–100)
MONOCYTES # BLD AUTO: 0.7 10E3/UL (ref 0–1.3)
MONOCYTES NFR BLD AUTO: 7 %
NEUTROPHILS # BLD AUTO: 8.3 10E3/UL (ref 1.6–8.3)
NEUTROPHILS NFR BLD AUTO: 82 %
NRBC # BLD AUTO: 0 10E3/UL
NRBC BLD AUTO-RTO: 0 /100
PLATELET # BLD AUTO: 223 10E3/UL (ref 150–450)
POTASSIUM SERPL-SCNC: 3.5 MMOL/L (ref 3.4–5.3)
PROT SERPL-MCNC: 6.5 G/DL (ref 6.4–8.3)
PROT/CREAT 24H UR: 0.11 MG/MG CR (ref 0–0.2)
RBC # BLD AUTO: 4.11 10E6/UL (ref 3.8–5.2)
SODIUM SERPL-SCNC: 138 MMOL/L (ref 135–145)
WBC # BLD AUTO: 10.1 10E3/UL (ref 4–11)

## 2024-07-28 PROCEDURE — 250N000011 HC RX IP 250 OP 636: Performed by: ADVANCED PRACTICE MIDWIFE

## 2024-07-28 PROCEDURE — C9290 INJ, BUPIVACAINE LIPOSOME: HCPCS | Performed by: STUDENT IN AN ORGANIZED HEALTH CARE EDUCATION/TRAINING PROGRAM

## 2024-07-28 PROCEDURE — 250N000013 HC RX MED GY IP 250 OP 250 PS 637

## 2024-07-28 PROCEDURE — 258N000003 HC RX IP 258 OP 636: Performed by: STUDENT IN AN ORGANIZED HEALTH CARE EDUCATION/TRAINING PROGRAM

## 2024-07-28 PROCEDURE — 250N000011 HC RX IP 250 OP 636: Performed by: STUDENT IN AN ORGANIZED HEALTH CARE EDUCATION/TRAINING PROGRAM

## 2024-07-28 PROCEDURE — 272N000001 HC OR GENERAL SUPPLY STERILE: Performed by: OBSTETRICS & GYNECOLOGY

## 2024-07-28 PROCEDURE — 710N000010 HC RECOVERY PHASE 1, LEVEL 2, PER MIN: Performed by: OBSTETRICS & GYNECOLOGY

## 2024-07-28 PROCEDURE — 258N000003 HC RX IP 258 OP 636: Performed by: ADVANCED PRACTICE MIDWIFE

## 2024-07-28 PROCEDURE — 36415 COLL VENOUS BLD VENIPUNCTURE: CPT | Performed by: ADVANCED PRACTICE MIDWIFE

## 2024-07-28 PROCEDURE — 258N000003 HC RX IP 258 OP 636: Performed by: MIDWIFE

## 2024-07-28 PROCEDURE — 250N000009 HC RX 250: Performed by: STUDENT IN AN ORGANIZED HEALTH CARE EDUCATION/TRAINING PROGRAM

## 2024-07-28 PROCEDURE — 250N000011 HC RX IP 250 OP 636

## 2024-07-28 PROCEDURE — 271N000001 HC OR GENERAL SUPPLY NON-STERILE: Performed by: OBSTETRICS & GYNECOLOGY

## 2024-07-28 PROCEDURE — 59514 CESAREAN DELIVERY ONLY: CPT | Mod: GC | Performed by: OBSTETRICS & GYNECOLOGY

## 2024-07-28 PROCEDURE — 84156 ASSAY OF PROTEIN URINE: CPT | Performed by: ADVANCED PRACTICE MIDWIFE

## 2024-07-28 PROCEDURE — 85041 AUTOMATED RBC COUNT: CPT | Performed by: ADVANCED PRACTICE MIDWIFE

## 2024-07-28 PROCEDURE — 370N000017 HC ANESTHESIA TECHNICAL FEE, PER MIN: Performed by: OBSTETRICS & GYNECOLOGY

## 2024-07-28 PROCEDURE — 80053 COMPREHEN METABOLIC PANEL: CPT | Performed by: ADVANCED PRACTICE MIDWIFE

## 2024-07-28 PROCEDURE — 360N000076 HC SURGERY LEVEL 3, PER MIN: Performed by: OBSTETRICS & GYNECOLOGY

## 2024-07-28 PROCEDURE — 120N000002 HC R&B MED SURG/OB UMMC

## 2024-07-28 RX ORDER — HYDROMORPHONE HCL IN WATER/PF 6 MG/30 ML
0.4 PATIENT CONTROLLED ANALGESIA SYRINGE INTRAVENOUS EVERY 5 MIN PRN
Status: DISCONTINUED | OUTPATIENT
Start: 2024-07-28 | End: 2024-07-28 | Stop reason: HOSPADM

## 2024-07-28 RX ORDER — AMOXICILLIN 250 MG
1 CAPSULE ORAL 2 TIMES DAILY
Status: DISCONTINUED | OUTPATIENT
Start: 2024-07-28 | End: 2024-07-30 | Stop reason: HOSPADM

## 2024-07-28 RX ORDER — IBUPROFEN 800 MG/1
800 TABLET, FILM COATED ORAL EVERY 6 HOURS
Status: DISCONTINUED | OUTPATIENT
Start: 2024-07-29 | End: 2024-07-29

## 2024-07-28 RX ORDER — HYDROCORTISONE 25 MG/G
CREAM TOPICAL 3 TIMES DAILY PRN
Status: DISCONTINUED | OUTPATIENT
Start: 2024-07-28 | End: 2024-07-30 | Stop reason: HOSPADM

## 2024-07-28 RX ORDER — CITRIC ACID/SODIUM CITRATE 334-500MG
30 SOLUTION, ORAL ORAL
Status: COMPLETED | OUTPATIENT
Start: 2024-07-28 | End: 2024-07-28

## 2024-07-28 RX ORDER — FENTANYL CITRATE 50 UG/ML
INJECTION, SOLUTION INTRAMUSCULAR; INTRAVENOUS PRN
Status: DISCONTINUED | OUTPATIENT
Start: 2024-07-28 | End: 2024-07-28

## 2024-07-28 RX ORDER — MISOPROSTOL 200 UG/1
800 TABLET ORAL
Status: DISCONTINUED | OUTPATIENT
Start: 2024-07-28 | End: 2024-07-28

## 2024-07-28 RX ORDER — FENTANYL CITRATE 50 UG/ML
50 INJECTION, SOLUTION INTRAMUSCULAR; INTRAVENOUS EVERY 5 MIN PRN
Status: DISCONTINUED | OUTPATIENT
Start: 2024-07-28 | End: 2024-07-28 | Stop reason: HOSPADM

## 2024-07-28 RX ORDER — HYDROMORPHONE HCL IN WATER/PF 6 MG/30 ML
0.2 PATIENT CONTROLLED ANALGESIA SYRINGE INTRAVENOUS EVERY 5 MIN PRN
Status: DISCONTINUED | OUTPATIENT
Start: 2024-07-28 | End: 2024-07-28 | Stop reason: HOSPADM

## 2024-07-28 RX ORDER — SODIUM CHLORIDE, SODIUM LACTATE, POTASSIUM CHLORIDE, CALCIUM CHLORIDE 600; 310; 30; 20 MG/100ML; MG/100ML; MG/100ML; MG/100ML
INJECTION, SOLUTION INTRAVENOUS CONTINUOUS PRN
Status: DISCONTINUED | OUTPATIENT
Start: 2024-07-28 | End: 2024-07-28

## 2024-07-28 RX ORDER — DIPHENHYDRAMINE HYDROCHLORIDE 50 MG/ML
25 INJECTION INTRAMUSCULAR; INTRAVENOUS EVERY 6 HOURS PRN
Status: DISCONTINUED | OUTPATIENT
Start: 2024-07-28 | End: 2024-07-28

## 2024-07-28 RX ORDER — ENOXAPARIN SODIUM 100 MG/ML
40 INJECTION SUBCUTANEOUS EVERY 24 HOURS
Status: DISCONTINUED | OUTPATIENT
Start: 2024-07-29 | End: 2024-07-30 | Stop reason: HOSPADM

## 2024-07-28 RX ORDER — PROCHLORPERAZINE MALEATE 10 MG
10 TABLET ORAL EVERY 6 HOURS PRN
Status: DISCONTINUED | OUTPATIENT
Start: 2024-07-28 | End: 2024-07-30 | Stop reason: HOSPADM

## 2024-07-28 RX ORDER — LIDOCAINE 40 MG/G
CREAM TOPICAL
Status: DISCONTINUED | OUTPATIENT
Start: 2024-07-28 | End: 2024-07-28

## 2024-07-28 RX ORDER — CARBOPROST TROMETHAMINE 250 UG/ML
250 INJECTION, SOLUTION INTRAMUSCULAR
Status: DISCONTINUED | OUTPATIENT
Start: 2024-07-28 | End: 2024-07-28

## 2024-07-28 RX ORDER — ONDANSETRON 2 MG/ML
INJECTION INTRAMUSCULAR; INTRAVENOUS PRN
Status: DISCONTINUED | OUTPATIENT
Start: 2024-07-28 | End: 2024-07-28

## 2024-07-28 RX ORDER — METOCLOPRAMIDE HYDROCHLORIDE 5 MG/ML
10 INJECTION INTRAMUSCULAR; INTRAVENOUS EVERY 6 HOURS PRN
Status: DISCONTINUED | OUTPATIENT
Start: 2024-07-28 | End: 2024-07-30 | Stop reason: HOSPADM

## 2024-07-28 RX ORDER — LOPERAMIDE HCL 2 MG
4 CAPSULE ORAL
Status: DISCONTINUED | OUTPATIENT
Start: 2024-07-28 | End: 2024-07-28

## 2024-07-28 RX ORDER — DIPHENHYDRAMINE HCL 25 MG
25 CAPSULE ORAL EVERY 6 HOURS PRN
Status: DISCONTINUED | OUTPATIENT
Start: 2024-07-28 | End: 2024-07-28

## 2024-07-28 RX ORDER — SODIUM CHLORIDE, SODIUM LACTATE, POTASSIUM CHLORIDE, CALCIUM CHLORIDE 600; 310; 30; 20 MG/100ML; MG/100ML; MG/100ML; MG/100ML
INJECTION, SOLUTION INTRAVENOUS CONTINUOUS
Status: DISCONTINUED | OUTPATIENT
Start: 2024-07-28 | End: 2024-07-28 | Stop reason: HOSPADM

## 2024-07-28 RX ORDER — LIDOCAINE 4 G/G
1 PATCH TOPICAL
Status: DISCONTINUED | OUTPATIENT
Start: 2024-07-28 | End: 2024-07-30 | Stop reason: HOSPADM

## 2024-07-28 RX ORDER — FENTANYL CITRATE 50 UG/ML
25 INJECTION, SOLUTION INTRAMUSCULAR; INTRAVENOUS EVERY 5 MIN PRN
Status: DISCONTINUED | OUTPATIENT
Start: 2024-07-28 | End: 2024-07-28 | Stop reason: HOSPADM

## 2024-07-28 RX ORDER — AZITHROMYCIN 500 MG/5ML
500 INJECTION, POWDER, LYOPHILIZED, FOR SOLUTION INTRAVENOUS
Status: COMPLETED | OUTPATIENT
Start: 2024-07-28 | End: 2024-07-28

## 2024-07-28 RX ORDER — OXYTOCIN/0.9 % SODIUM CHLORIDE 30/500 ML
PLASTIC BAG, INJECTION (ML) INTRAVENOUS CONTINUOUS PRN
Status: DISCONTINUED | OUTPATIENT
Start: 2024-07-28 | End: 2024-07-28

## 2024-07-28 RX ORDER — OXYTOCIN/0.9 % SODIUM CHLORIDE 30/500 ML
340 PLASTIC BAG, INJECTION (ML) INTRAVENOUS CONTINUOUS PRN
Status: DISCONTINUED | OUTPATIENT
Start: 2024-07-28 | End: 2024-07-28

## 2024-07-28 RX ORDER — SIMETHICONE 80 MG
80 TABLET,CHEWABLE ORAL 4 TIMES DAILY PRN
Status: DISCONTINUED | OUTPATIENT
Start: 2024-07-28 | End: 2024-07-30 | Stop reason: HOSPADM

## 2024-07-28 RX ORDER — ONDANSETRON 4 MG/1
4 TABLET, ORALLY DISINTEGRATING ORAL EVERY 6 HOURS PRN
Status: DISCONTINUED | OUTPATIENT
Start: 2024-07-28 | End: 2024-07-30 | Stop reason: HOSPADM

## 2024-07-28 RX ORDER — DEXTROSE, SODIUM CHLORIDE, SODIUM LACTATE, POTASSIUM CHLORIDE, AND CALCIUM CHLORIDE 5; .6; .31; .03; .02 G/100ML; G/100ML; G/100ML; G/100ML; G/100ML
INJECTION, SOLUTION INTRAVENOUS CONTINUOUS
Status: DISCONTINUED | OUTPATIENT
Start: 2024-07-28 | End: 2024-07-30 | Stop reason: HOSPADM

## 2024-07-28 RX ORDER — MORPHINE SULFATE 0.5 MG/ML
INJECTION, SOLUTION EPIDURAL; INTRATHECAL; INTRAVENOUS PRN
Status: DISCONTINUED | OUTPATIENT
Start: 2024-07-28 | End: 2024-07-28

## 2024-07-28 RX ORDER — OXYTOCIN 10 [USP'U]/ML
10 INJECTION, SOLUTION INTRAMUSCULAR; INTRAVENOUS
Status: DISCONTINUED | OUTPATIENT
Start: 2024-07-28 | End: 2024-07-30 | Stop reason: HOSPADM

## 2024-07-28 RX ORDER — AMOXICILLIN 250 MG
2 CAPSULE ORAL 2 TIMES DAILY
Status: DISCONTINUED | OUTPATIENT
Start: 2024-07-28 | End: 2024-07-30 | Stop reason: HOSPADM

## 2024-07-28 RX ORDER — PROCHLORPERAZINE 25 MG
25 SUPPOSITORY, RECTAL RECTAL EVERY 12 HOURS PRN
Status: DISCONTINUED | OUTPATIENT
Start: 2024-07-28 | End: 2024-07-30 | Stop reason: HOSPADM

## 2024-07-28 RX ORDER — NALOXONE HYDROCHLORIDE 0.4 MG/ML
0.1 INJECTION, SOLUTION INTRAMUSCULAR; INTRAVENOUS; SUBCUTANEOUS
Status: DISCONTINUED | OUTPATIENT
Start: 2024-07-28 | End: 2024-07-28 | Stop reason: HOSPADM

## 2024-07-28 RX ORDER — LOPERAMIDE HCL 2 MG
2 CAPSULE ORAL
Status: DISCONTINUED | OUTPATIENT
Start: 2024-07-28 | End: 2024-07-28

## 2024-07-28 RX ORDER — MODIFIED LANOLIN
OINTMENT (GRAM) TOPICAL
Status: DISCONTINUED | OUTPATIENT
Start: 2024-07-28 | End: 2024-07-30 | Stop reason: HOSPADM

## 2024-07-28 RX ORDER — TRANEXAMIC ACID 10 MG/ML
1 INJECTION, SOLUTION INTRAVENOUS EVERY 30 MIN PRN
Status: DISCONTINUED | OUTPATIENT
Start: 2024-07-28 | End: 2024-07-28 | Stop reason: HOSPADM

## 2024-07-28 RX ORDER — CEFAZOLIN SODIUM/WATER 2 G/20 ML
2 SYRINGE (ML) INTRAVENOUS SEE ADMIN INSTRUCTIONS
Status: DISCONTINUED | OUTPATIENT
Start: 2024-07-28 | End: 2024-07-28 | Stop reason: HOSPADM

## 2024-07-28 RX ORDER — ONDANSETRON 2 MG/ML
4 INJECTION INTRAMUSCULAR; INTRAVENOUS EVERY 6 HOURS PRN
Status: DISCONTINUED | OUTPATIENT
Start: 2024-07-28 | End: 2024-07-30 | Stop reason: HOSPADM

## 2024-07-28 RX ORDER — CEFAZOLIN SODIUM/WATER 2 G/20 ML
2 SYRINGE (ML) INTRAVENOUS
Status: COMPLETED | OUTPATIENT
Start: 2024-07-28 | End: 2024-07-28

## 2024-07-28 RX ORDER — METOCLOPRAMIDE 10 MG/1
10 TABLET ORAL EVERY 6 HOURS PRN
Status: DISCONTINUED | OUTPATIENT
Start: 2024-07-28 | End: 2024-07-30 | Stop reason: HOSPADM

## 2024-07-28 RX ORDER — LIDOCAINE 40 MG/G
CREAM TOPICAL
Status: DISCONTINUED | OUTPATIENT
Start: 2024-07-28 | End: 2024-07-30 | Stop reason: HOSPADM

## 2024-07-28 RX ORDER — ACETAMINOPHEN 325 MG/1
975 TABLET ORAL EVERY 6 HOURS
Status: DISCONTINUED | OUTPATIENT
Start: 2024-07-28 | End: 2024-07-30 | Stop reason: HOSPADM

## 2024-07-28 RX ORDER — BUPIVACAINE HYDROCHLORIDE 2.5 MG/ML
INJECTION, SOLUTION EPIDURAL; INFILTRATION; INTRACAUDAL
Status: DISCONTINUED | OUTPATIENT
Start: 2024-07-28 | End: 2024-07-28

## 2024-07-28 RX ORDER — BISACODYL 10 MG
10 SUPPOSITORY, RECTAL RECTAL DAILY PRN
Status: DISCONTINUED | OUTPATIENT
Start: 2024-07-30 | End: 2024-07-30 | Stop reason: HOSPADM

## 2024-07-28 RX ORDER — MISOPROSTOL 200 UG/1
400 TABLET ORAL
Status: DISCONTINUED | OUTPATIENT
Start: 2024-07-28 | End: 2024-07-28

## 2024-07-28 RX ORDER — DEXAMETHASONE SODIUM PHOSPHATE 4 MG/ML
4 INJECTION, SOLUTION INTRA-ARTICULAR; INTRALESIONAL; INTRAMUSCULAR; INTRAVENOUS; SOFT TISSUE
Status: DISCONTINUED | OUTPATIENT
Start: 2024-07-28 | End: 2024-07-28 | Stop reason: HOSPADM

## 2024-07-28 RX ORDER — KETOROLAC TROMETHAMINE 30 MG/ML
30 INJECTION, SOLUTION INTRAMUSCULAR; INTRAVENOUS EVERY 6 HOURS
Status: DISPENSED | OUTPATIENT
Start: 2024-07-28 | End: 2024-07-29

## 2024-07-28 RX ORDER — NALBUPHINE HYDROCHLORIDE 10 MG/ML
2.5-5 INJECTION, SOLUTION INTRAMUSCULAR; INTRAVENOUS; SUBCUTANEOUS EVERY 6 HOURS PRN
Status: DISCONTINUED | OUTPATIENT
Start: 2024-07-28 | End: 2024-07-28

## 2024-07-28 RX ORDER — ONDANSETRON 4 MG/1
4 TABLET, ORALLY DISINTEGRATING ORAL EVERY 30 MIN PRN
Status: DISCONTINUED | OUTPATIENT
Start: 2024-07-28 | End: 2024-07-28 | Stop reason: HOSPADM

## 2024-07-28 RX ORDER — ONDANSETRON 2 MG/ML
4 INJECTION INTRAMUSCULAR; INTRAVENOUS EVERY 30 MIN PRN
Status: DISCONTINUED | OUTPATIENT
Start: 2024-07-28 | End: 2024-07-28 | Stop reason: HOSPADM

## 2024-07-28 RX ORDER — CYCLOBENZAPRINE HCL 5 MG
10 TABLET ORAL 3 TIMES DAILY
Status: DISCONTINUED | OUTPATIENT
Start: 2024-07-28 | End: 2024-07-30 | Stop reason: HOSPADM

## 2024-07-28 RX ORDER — OXYCODONE HYDROCHLORIDE 5 MG/1
5 TABLET ORAL EVERY 4 HOURS PRN
Status: DISCONTINUED | OUTPATIENT
Start: 2024-07-28 | End: 2024-07-30 | Stop reason: HOSPADM

## 2024-07-28 RX ORDER — OXYTOCIN 10 [USP'U]/ML
10 INJECTION, SOLUTION INTRAMUSCULAR; INTRAVENOUS
Status: DISCONTINUED | OUTPATIENT
Start: 2024-07-28 | End: 2024-07-28

## 2024-07-28 RX ORDER — LIDOCAINE HCL/EPINEPHRINE/PF 2%-1:200K
VIAL (ML) INJECTION PRN
Status: DISCONTINUED | OUTPATIENT
Start: 2024-07-28 | End: 2024-07-28

## 2024-07-28 RX ORDER — ACETAMINOPHEN 325 MG/1
975 TABLET ORAL ONCE
Status: COMPLETED | OUTPATIENT
Start: 2024-07-28 | End: 2024-07-28

## 2024-07-28 RX ORDER — OXYTOCIN/0.9 % SODIUM CHLORIDE 30/500 ML
100-340 PLASTIC BAG, INJECTION (ML) INTRAVENOUS CONTINUOUS PRN
Status: DISCONTINUED | OUTPATIENT
Start: 2024-07-28 | End: 2024-07-30 | Stop reason: HOSPADM

## 2024-07-28 RX ADMIN — SODIUM CHLORIDE, POTASSIUM CHLORIDE, SODIUM LACTATE AND CALCIUM CHLORIDE: 600; 310; 30; 20 INJECTION, SOLUTION INTRAVENOUS at 09:18

## 2024-07-28 RX ADMIN — KETOROLAC TROMETHAMINE 30 MG: 30 INJECTION, SOLUTION INTRAMUSCULAR at 16:57

## 2024-07-28 RX ADMIN — MIDAZOLAM 2 MG: 1 INJECTION INTRAMUSCULAR; INTRAVENOUS at 16:24

## 2024-07-28 RX ADMIN — PHENYLEPHRINE HYDROCHLORIDE 100 MCG: 10 INJECTION INTRAVENOUS at 15:57

## 2024-07-28 RX ADMIN — BUPIVACAINE 20 ML: 13.3 INJECTION, SUSPENSION, LIPOSOMAL INFILTRATION at 17:10

## 2024-07-28 RX ADMIN — FENTANYL CITRATE 100 MCG: 50 INJECTION INTRAMUSCULAR; INTRAVENOUS at 16:00

## 2024-07-28 RX ADMIN — LIDOCAINE HYDROCHLORIDE,EPINEPHRINE BITARTRATE 5 MG: 20; .005 INJECTION, SOLUTION EPIDURAL; INFILTRATION; INTRACAUDAL; PERINEURAL at 15:42

## 2024-07-28 RX ADMIN — OXYCODONE HYDROCHLORIDE 5 MG: 5 TABLET ORAL at 18:27

## 2024-07-28 RX ADMIN — Medication 500 MG: at 15:48

## 2024-07-28 RX ADMIN — PHENYLEPHRINE HYDROCHLORIDE 100 MCG: 10 INJECTION INTRAVENOUS at 16:01

## 2024-07-28 RX ADMIN — Medication 2 G: at 15:48

## 2024-07-28 RX ADMIN — DEXMEDETOMIDINE HYDROCHLORIDE 8 MCG: 100 INJECTION, SOLUTION INTRAVENOUS at 16:00

## 2024-07-28 RX ADMIN — BUPIVACAINE HYDROCHLORIDE 20 ML: 2.5 INJECTION, SOLUTION EPIDURAL; INFILTRATION; INTRACAUDAL at 17:10

## 2024-07-28 RX ADMIN — Medication 300 ML/HR: at 16:20

## 2024-07-28 RX ADMIN — ONDANSETRON 4 MG: 2 INJECTION INTRAMUSCULAR; INTRAVENOUS at 15:50

## 2024-07-28 RX ADMIN — LIDOCAINE HYDROCHLORIDE,EPINEPHRINE BITARTRATE 5 MG: 20; .005 INJECTION, SOLUTION EPIDURAL; INFILTRATION; INTRACAUDAL; PERINEURAL at 15:53

## 2024-07-28 RX ADMIN — CYCLOBENZAPRINE HYDROCHLORIDE 10 MG: 5 TABLET, FILM COATED ORAL at 22:20

## 2024-07-28 RX ADMIN — PHENYLEPHRINE HYDROCHLORIDE 50 MCG/MIN: 10 INJECTION INTRAVENOUS at 16:02

## 2024-07-28 RX ADMIN — DEXMEDETOMIDINE HYDROCHLORIDE 12 MCG: 100 INJECTION, SOLUTION INTRAVENOUS at 16:09

## 2024-07-28 RX ADMIN — LIDOCAINE 1 PATCH: 4 PATCH TOPICAL at 22:19

## 2024-07-28 RX ADMIN — LIDOCAINE HYDROCHLORIDE,EPINEPHRINE BITARTRATE 5 MG: 20; .005 INJECTION, SOLUTION EPIDURAL; INFILTRATION; INTRACAUDAL; PERINEURAL at 15:36

## 2024-07-28 RX ADMIN — OXYCODONE HYDROCHLORIDE 5 MG: 5 TABLET ORAL at 22:20

## 2024-07-28 RX ADMIN — MORPHINE SULFATE 3 MG: 0.5 INJECTION, SOLUTION EPIDURAL; INTRATHECAL; INTRAVENOUS at 16:48

## 2024-07-28 RX ADMIN — SODIUM CITRATE AND CITRIC ACID MONOHYDRATE 30 ML: 500; 334 SOLUTION ORAL at 15:37

## 2024-07-28 RX ADMIN — ACETAMINOPHEN 975 MG: 325 TABLET, FILM COATED ORAL at 15:42

## 2024-07-28 RX ADMIN — KETOROLAC TROMETHAMINE 30 MG: 30 INJECTION, SOLUTION INTRAMUSCULAR at 22:54

## 2024-07-28 RX ADMIN — SODIUM CHLORIDE, POTASSIUM CHLORIDE, SODIUM LACTATE AND CALCIUM CHLORIDE: 600; 310; 30; 20 INJECTION, SOLUTION INTRAVENOUS at 03:08

## 2024-07-28 RX ADMIN — SODIUM CHLORIDE, POTASSIUM CHLORIDE, SODIUM LACTATE AND CALCIUM CHLORIDE: 600; 310; 30; 20 INJECTION, SOLUTION INTRAVENOUS at 15:48

## 2024-07-28 RX ADMIN — LIDOCAINE HYDROCHLORIDE,EPINEPHRINE BITARTRATE 5 MG: 20; .005 INJECTION, SOLUTION EPIDURAL; INFILTRATION; INTRACAUDAL; PERINEURAL at 15:48

## 2024-07-28 RX ADMIN — ACETAMINOPHEN 975 MG: 325 TABLET, FILM COATED ORAL at 21:42

## 2024-07-28 RX ADMIN — Medication: at 13:56

## 2024-07-28 RX ADMIN — DIPHENHYDRAMINE HYDROCHLORIDE 25 MG: 50 INJECTION, SOLUTION INTRAMUSCULAR; INTRAVENOUS at 07:38

## 2024-07-28 ASSESSMENT — ACTIVITIES OF DAILY LIVING (ADL)
ADLS_ACUITY_SCORE: 18

## 2024-07-28 NOTE — ANESTHESIA PROCEDURE NOTES
"TAP Procedure Note    Pre-Procedure   Staff -        Anesthesiologist:  Minnie Peralta MD       Resident/Fellow: Yola Lara MD       Performed By: with residents       Procedure performed by resident/fellow/CRNA in presence of a teaching physician.         Location: OR       Procedure Start/Stop Times: 7/28/2024 5:10 PM and 7/28/2024 5:15 PM       Pre-Anesthestic Checklist: patient identified, IV checked, site marked, risks and benefits discussed, informed consent, monitors and equipment checked, pre-op evaluation, at physician/surgeon's request and post-op pain management  Timeout:       Correct Patient: Yes        Correct Procedure: Yes        Correct Site: Yes        Correct Position: Yes        Correct Laterality: Yes        Site Marked: Yes  Procedure Documentation  Procedure: TAP       Laterality: bilateral       Patient Position: supine       Skin prep: Chloraprep       Needle Gauge: 21.        Needle Length (millimeters): 110        Ultrasound guided       1. Ultrasound was used to identify targeted nerve, plexus, vascular marker, or fascial plane and place a needle adjacent to it in real-time.       2. Ultrasound was used to visualize the spread of anesthetic in close proximity to the above referenced structure.       3. A permanent image is entered into the patient's record.       4. The visualized anatomic structures appeared normal.       5. There were no apparent abnormal pathologic findings.  Medication(s) Administered   Bupivacaine 0.25% PF (Infiltration) - Infiltration   20 mL - 7/28/2024 5:10:00 PM  Bupivacaine liposome (Exparel) 1.3% LA inj susp (Infiltration) - Infiltration   20 mL - 7/28/2024 5:10:00 PM  Medication Administration Time: 7/28/2024 5:10 PM      FOR [facility] ([facility] [place] ONLY:   Pain Team Contact information: please page the Pain Team Via [employer]. Search \"Pain\". During daytime hours, please page the attending first. At night please page the resident first.      "

## 2024-07-28 NOTE — CONSULTS
"OB Consult Note    Contacted by Mae Polo CNM to assess Anna Qiu for arrest of dilation, requesting  delivery.     S: pt states \"I'm miserable.\" Getting less relief from epidural. Still feeling contractions and states she feels \"done and over it.\" Interested in moving forward with  delivery.     O:  BP (!) 140/75   Temp 98.4  F (36.9  C) (Oral)   Resp 18   LMP 10/28/2023 (Exact Date)   SpO2 100%   Gen: resting in bed   Abd: gravid, non-tender   : deferred     FHT: 135 bpm baseline, moderate variability, accels, no decels  [place]: 5 contractions in 10 minutes    A/P: 28 year old yo  at 39w1d here for IOL in the setting of mild polyhydramnios. Consulted by CNM service for evaluation of arrest of dilation. Pregnancy and intrapartum course also notable for new diagnosis of gHTN.    Patient has undergone induction with misoprostol and bianchi balloon followed by augmentation with pitocin and AROM. She was first called 6-7 cm at 0800 this AM and cervix remains unchanged on most recent exam by CNM (same examiner) meeting criteria for arrest of dilation. During her labor course she met criteria for gestational hypertension. Pt additionally reports exhaustion and requests  delivery. D/w CNM team and staff Dr. Hernandez. Will plan to move forward with CS.    Recommendations:   - pitocin off  - ancef/flagyl for abx  - f/up HELLP labs given new dx of gHTN  - to OR in 30 minutes, anesthesia aware     The risks, benefits, and alternatives of  section were discussed, including the risks of bleeding, infection, injury to surrounding organs, fetal injury, and remote risks of hysterectomy. She consented to a blood transfusion in the event of a life threatening amount of bleeding. She had time to ask questions and agreed to proceed. Surgical consent was signed.     Fetal Well Being: Category I, reactive     Discussed with Dr. David Bravo MD  Ob/Gyn Resident, PGY-3  24 " 3:03 PM    I have reviewed and discussed the patient with the resident and the CNM team. I have reviewed, edited, and agree with the note.     Kanika Hernandez MD

## 2024-07-28 NOTE — PROGRESS NOTES
SUBJECTIVE:  ==============  Anna Qiu  Estimated Date of Delivery: Aug 3, 2024  General appearance: uncomfortable with contractions- feeling them in her abdomen as well- does not want anesthesia to come back and reassess. Requesting a C/S. Feels done with the whole process.  Support: family at home, not coming back      OBJECTIVE:  ==============  VITALS  Blood pressure 132/75, temperature 98.4  F (36.9  C), temperature source Oral, resp. rate 18, last menstrual period 10/28/2023, SpO2 100%, not currently breastfeeding.  Patient Vitals for the past 24 hrs:   BP Temp Temp src Resp SpO2   24 1400 -- -- -- 18 100 %   24 1330 132/75 98.4  F (36.9  C) Oral 18 100 %   24 1300 -- -- -- 18 100 %   24 1230 118/61 98.4  F (36.9  C) Oral 18 98 %   24 1200 -- -- -- -- 98 %   24 1130 (!) 142/82 97.8  F (36.6  C) Oral 18 97 %   24 1100 -- -- -- -- 98 %   24 1030 -- 97.9  F (36.6  C) Oral 16 --   24 0930 138/76 98.4  F (36.9  C) Oral 16 97 %   24 0900 -- -- -- -- 100 %   24 0830 124/66 97.4  F (36.3  C) Oral 16 100 %   24 0730 133/79 98.9  F (37.2  C) Oral 16 98 %   24 0700 -- -- -- -- 97 %   24 0630 125/69 97.9  F (36.6  C) Oral -- --   24 0530 131/62 97.8  F (36.6  C) Oral -- --   24 0430 127/59 97.8  F (36.6  C) Oral -- --   07/28/24 0330 119/56 97.8  F (36.6  C) Oral -- --   24 0230 126/68 98.1  F (36.7  C) Oral -- 97 %   24 0130 126/65 98  F (36.7  C) Oral -- 98 %   24 0030 121/67 97.9  F (36.6  C) Oral -- 96 %   24 137/72 -- -- -- --   24 (!) 145/76 -- -- -- --   24 128/70 -- -- -- --   24 127/70 -- -- -- --   24 126/70 -- -- -- --   24 (!) 141/75 -- -- -- --   24 126/69 -- -- -- --   24 130/70 -- -- -- --   24 127/73 -- -- -- --   24 122/68 -- -- -- --   24 122/70 97.9  F  (36.6  C) Oral -- --   07/27/24 1955 128/74 -- -- -- --   07/27/24 1950 127/66 -- -- -- --   07/27/24 1945 129/74 -- -- -- --   07/27/24 1940 131/76 -- -- -- --   07/27/24 1935 137/74 -- -- -- --   07/27/24 1930 132/71 -- -- -- --   07/27/24 1925 134/69 -- -- -- --   07/27/24 1920 131/70 -- -- -- --   07/27/24 1915 132/70 -- -- -- --   07/27/24 1909 133/71 -- -- 18 100 %   07/27/24 1905 133/69 -- -- 18 100 %   07/27/24 1900 (!) 140/79 -- -- 18 100 %   07/27/24 1858 (!) 140/79 -- -- 18 --   07/27/24 1856 (!) 147/81 -- -- 18 --   07/27/24 1854 (!) 144/89 -- -- 18 100 %   07/27/24 1852 139/88 -- -- 18 100 %   07/27/24 1850 137/89 -- -- 18 100 %   07/27/24 1600 137/83 97.4  F (36.3  C) Oral 16 --       FETAL HEART RATE ASSESSMENT:  Baseline rate 120s, normal  Variability moderate  Accelerations present   Decelerations present, deceleration type: variable, deceleration frequency: intermittent.    late , deceleration frequency: intermittent. Are the decelerations significant?  Yes, recurrent?  no     CONTRACTIONS: Contractions every 1-5 minutes with coupling and tripling. IUPC not functioning  Pitocin- 12 mu/min.,  Antibiotics- none    ROM: clear fluid  PELVIC EXAM:PELVIC EXAM: 7/ 70/ Mid/ soft/ -4, IUPC in vagina, removed    # Pain Assessment:      7/28/2024     1:30 PM   Current Pain Score   Patient currently in pain? dayron Castillo is experiencing pain due to ctx. Pain management was discussed and the plan was created in a collaborative fashion.  Will be moving to C/S and anesthesia will be involved.      Assessment: EFM interpretation suggests absence of concern for fetal metabolic acidemia at this time due to variability: moderate     The interventions currently taken to improve the fetal heart rate tracing and fetal oxygenation are: discontinue oxytocin     Labor course:  Misoprostol #1 at 0940  Huber balloon placed at 1340  Huber out at 2014  SROM 2355  1952 meconium noted leaking   0800 6/50/-4 with BB, not  ruptured, scant old blood on towel between legs  0900 650/-4 AROM with FSE- clear, GHTN and PreE labs ordered  1100 /-4, IUPC and FSE placed, Pit @8, MVUs 250  1421 /-4, IUPC in vagina and not functioning- removed. Pt requesting C/S, meets criteria for arrest of labor, transfer of care to MD team for C/S    ASSESSMENT:  ==============  Anna PERSON Lazarus  28 year old  female  Estimated Date of Delivery: Aug 3, 2024  IUP @ 39w1d  for induction of labor.  Indication: mild poly     Fetal Heart Rate Tracing category two over the last 30 minutes  GBS- negative    Patient Active Problem List   Diagnosis    High risk social situation    Family history of sudden cardiac death (SCD)    Chest pain, atypical    Atopic dermatitis    Anxious depression    History of syphilis    Susceptible to varicella (non-immune), currently pregnant    HRP (high risk pregnancy), third trimester    Nonimmune to hepatitis B virus    Vitamin D deficiency    Polyhydramnios in third trimester    Labor and delivery, indication for care    Gestational hypertension, third trimester          PLAN:  ===========  -Discussed with Dr. Hernandez and team who agree pt meets criteria for arrest of labor, prepare for C/S  -STAT pre-e panel re-ordered as discontinued by lab  -Meeting criteria for GHTN as now with elevated BP >4hrs apart    Transfer of care to MD team    Mae MAURER, JULIANM

## 2024-07-28 NOTE — ANESTHESIA CARE TRANSFER NOTE
Patient: Anna Qiu    Procedure: Procedure(s):   section       Diagnosis: * No pre-op diagnosis entered *  Diagnosis Additional Information: No value filed.    Anesthesia Type:   Epidural     Note:    Oropharynx: oropharynx clear of all foreign objects and spontaneously breathing  Level of Consciousness: awake      Independent Airway: airway patency satisfactory and stable  Dentition: dentition unchanged  Vital Signs Stable: post-procedure vital signs reviewed and stable  Report to RN Given: handoff report given  Patient transferred to: Labor and Delivery  Comments: -VSS  Handoff Report: Identifed the Patient, Identified the Reponsible Provider, Reviewed the pertinent medical history, Discussed the surgical course, Reviewed Intra-OP anesthesia mangement and issues during anesthesia, Set expectations for post-procedure period and Allowed opportunity for questions and acknowledgement of understanding      Vitals:  Vitals Value Taken Time   /78 24 1730   Temp 37.2  C (98.9  F) 24 1725   Pulse 101 24 1735   Resp 30 24 1735   SpO2 100 % 24 1735   Vitals shown include unfiled device data.    Electronically Signed By: Yola Lara MD  2024  5:36 PM

## 2024-07-28 NOTE — BRIEF OP NOTE
[facility]   Brief Operative Note     Surgery Date: 2024    Surgeon:  Dr. Kanika Hernandez MD    Assistants:  Eli Moses MD PGY-1    Laura Bravo MD PGY-3    Pre-op Diagnosis:    - Intrauterine pregnancy at 39w1d  - Arrest of Dilation   - Polyhydramnios and suspected Type II DM    Post-op Diagnosis:    - Same   - Liveborn male infant     Procedure:  Primary low-transverse  section with single layer uterine closure via Pfannenstiel incision    Anesthesia: Epidural    EBL:  1275 mL  IVF:  1100 mL crystalloid  UOP:  50 mL clear urine at the end of the case    Drains: Huber Catheter    Specimens:  Cord section/blood    Complications:  None apparent    Findings:   Clear amniotic fluid. Liveborn male infant in vertex LAURIE presentation. Apgar 9 at 5 minutes, 1 minute pending. Weight 7 lb 7 oz. Normal uterus, fallopian tubes, and ovaries.     Disposition:  Transferred in stable condition to [facility]    Eli Moses MD PGY-1  5:07 PM 24     Kanika Hernandez MD

## 2024-07-28 NOTE — PROGRESS NOTES
SUBJECTIVE:  ==============  Anna Qiu  Estimated Date of Delivery: Aug 3, 2024  General appearance: comfortable other than vaginal pressure  Support: Sisters at bedside      OBJECTIVE:  ==============  VITALS  Blood pressure (!) 142/82, temperature 97.8  F (36.6  C), temperature source Oral, resp. rate 18, last menstrual period 10/28/2023, SpO2 97%, not currently breastfeeding.  Patient Vitals for the past 24 hrs:   BP Temp Temp src Resp SpO2   24 1130 (!) 142/82 97.8  F (36.6  C) Oral 18 97 %   24 1100 -- -- -- -- 98 %   24 1030 -- 97.9  F (36.6  C) Oral 16 --   24 0930 138/76 98.4  F (36.9  C) Oral 16 97 %   24 0900 -- -- -- -- 100 %   24 0830 124/66 97.4  F (36.3  C) Oral 16 100 %   24 0730 133/79 98.9  F (37.2  C) Oral 16 98 %   24 0700 -- -- -- -- 97 %   24 0630 125/69 97.9  F (36.6  C) Oral -- --   24 0530 131/62 97.8  F (36.6  C) Oral -- --   24 0430 127/59 97.8  F (36.6  C) Oral -- --   24 0330 119/56 97.8  F (36.6  C) Oral -- --   24 0230 126/68 98.1  F (36.7  C) Oral -- 97 %   24 0130 126/65 98  F (36.7  C) Oral -- 98 %   24 0030 121/67 97.9  F (36.6  C) Oral -- 96 %   24 2330 137/72 -- -- -- --   24 2245 (!) 145/76 -- -- -- --   24 128/70 -- -- -- --   24 127/70 -- -- -- --   24 126/70 -- -- -- --   24 (!) 141/75 -- -- -- --   24 126/69 -- -- -- --   24 130/70 -- -- -- --   24 127/73 -- -- -- --   24 122/68 -- -- -- --   24 122/70 97.9  F (36.6  C) Oral -- --   24 128/74 -- -- -- --   24 127/66 -- -- -- --   24 129/74 -- -- -- --   24 131/76 -- -- -- --   24 137/74 -- -- -- --   24 132/71 -- -- -- --   24 134/69 -- -- -- --   24 131/70 -- -- -- --   24 132/70 -- -- -- --   24  133/71 -- -- 18 100 %   24 1905 133/69 -- -- 18 100 %   24 1900 (!) 140/79 -- -- 18 100 %   24 185 (!) 140/79 -- -- 18 --   24 185 (!) 147/81 -- -- 18 --   24 185 (!) 144/89 -- -- 18 100 %   24 139/88 -- -- 18 100 %   24 185 137/89 -- -- 18 100 %   24 1600 137/83 97.4  F (36.3  C) Oral 16 --   24 1320 133/76 97.6  F (36.4  C) Oral 18 --       FETAL HEART RATE ASSESSMENT:  Baseline rate 130, normal  Variability moderate  Accelerations present   Decelerations not present      CONTRACTIONS: Contractions every 2-4 minutes.  Palpate: moderate, MVUs 250, from [id number]  Pitocin- 8 mu/min.,  Antibiotics- none    ROM: clear fluid  PELVIC EXAM:PELVIC EXAM: / / soft/ -4, IUPC and FSE placed following discussion with pt of R/B/A     # Pain Assessment:      2024    11:41 AM   Current Pain Score   Patient currently in pain? denies         Assessment: EFM interpretation suggests absence of concern for fetal metabolic acidemia at this time due to accelerations present, decelerations absent, heart rate: normal baseline, and variability: moderate      Labor course:  Misoprostol #1 at 0940  Huber balloon placed at 1340  Huber out at   SROM 2355  0872 meconium noted leaking   0800 /-4 with BB, not ruptured, scant old blood on towel between legs  0900 /-4 AROM with FSE- clear, GHTN and PreE labs ordered  1100 /4, IUPC and FSE placed, Pit @8, MVUs 250    ASSESSMENT:  ==============  Anna Qiu  28 year old  female  Estimated Date of Delivery: Aug 3, 2024  IUP @ 39w1d  for induction of labor.  Indication: mild poly     Fetal Heart Rate Tracing category one over the last 60 minutes  GBS- negative    Patient Active Problem List   Diagnosis    High risk social situation    Family history of sudden cardiac death (SCD)    Chest pain, atypical    Atopic dermatitis    Anxious depression    History of syphilis    Susceptible to varicella  (non-immune), currently pregnant    HRP (high risk pregnancy), third trimester    Nonimmune to hepatitis B virus    Vitamin D deficiency    Polyhydramnios in third trimester    Labor and delivery, indication for care    Gestational hypertension, third trimester          PLAN:  ===========    -Anticipate progress and NSVB.   -Reevaluate progress in 2 hours or sooner with a change in status.  -Encourage position changes  -Normal UPC ratio 0.11, blood draw delayed per pt request    Mae MAURER, CNM

## 2024-07-28 NOTE — PROGRESS NOTES
SUBJECTIVE:  ==============  Anna Qiu  Estimated Date of Delivery: Aug 3, 2024  General appearance: comfortable with epidural. Got some sleep last night but not as much as desired due to family visiting. Itching improved with Benadryl.   Support: Family at home resting per pt request      OBJECTIVE:  ==============  VITALS  Blood pressure 124/66, temperature 97.4  F (36.3  C), temperature source Oral, resp. rate 16, last menstrual period 10/28/2023, SpO2 100%, not currently breastfeeding.  Patient Vitals for the past 24 hrs:   BP Temp Temp src Resp SpO2   24 0900 -- -- -- -- 100 %   24 0830 124/66 97.4  F (36.3  C) Oral 16 100 %   24 0730 133/79 98.9  F (37.2  C) Oral 16 98 %   24 0700 -- -- -- -- 97 %   24 0630 125/69 97.9  F (36.6  C) Oral -- --   24 0530 131/62 97.8  F (36.6  C) Oral -- --   24 0430 127/59 97.8  F (36.6  C) Oral -- --   24 0330 119/56 97.8  F (36.6  C) Oral -- --   24 0230 126/68 98.1  F (36.7  C) Oral -- 97 %   24 0130 126/65 98  F (36.7  C) Oral -- 98 %   24 0030 121/67 97.9  F (36.6  C) Oral -- 96 %   24 2330 137/72 -- -- -- --   24 (!) 145/76 -- -- -- --   24 128/70 -- -- -- --   24 127/70 -- -- -- --   24 126/70 -- -- -- --   24 (!) 141/75 -- -- -- --   24 126/69 -- -- -- --   24 130/70 -- -- -- --   24 127/73 -- -- -- --   24 122/68 -- -- -- --   24 122/70 97.9  F (36.6  C) Oral -- --   24 128/74 -- -- -- --   24 127/66 -- -- -- --   24 129/74 -- -- -- --   24 131/76 -- -- -- --   24 137/74 -- -- -- --   24 132/71 -- -- -- --   24 134/69 -- -- -- --   24 131/70 -- -- -- --   24 132/70 -- -- -- --   24 133/71 -- -- 18 100 %   24 133/69 -- -- 18 100 %   24 (!)  140/79 -- -- 18 100 %   24 1858 (!) 140/79 -- -- 18 --   24 1856 (!) 147/81 -- -- 18 --   24 1854 (!) 144/89 -- -- 18 100 %   24 1852 139/88 -- -- 18 100 %   24 1850 137/89 -- -- 18 100 %   24 1600 137/83 97.4  F (36.3  C) Oral 16 --   24 1320 133/76 97.6  F (36.4  C) Oral 18 --     Of note, RN reports BP elevated during epidural placement yesterday as pt was screaming in fear.     FETAL HEART RATE ASSESSMENT:  Baseline rate 130s, normal  Variability moderate  Accelerations present   Decelerations not present      CONTRACTIONS: Contractions every 1-3 minutes.  Palpate: moderate  Pitocin- 6 mu/min.,  Antibiotics- none    ROM: not ruptured  PELVIC EXAM:PELVIC EXAM: 6/ 50%/ Mid/ soft/ -4, BB, ballotable head, BSUS by CNM reveals vtx fetus    # Pain Assessment:      2024     9:00 AM   Current Pain Score   Patient currently in pain? denies   No pain with epidural      Assessment: EFM interpretation suggests absence of concern for fetal metabolic acidemia at this time due to accelerations present, decelerations absent, heart rate: normal baseline, and variability: moderate    Labor course:  Misoprostol #1 at 0940  Huber balloon placed at 1340  Huber out at 2014  SROM [id number]  0545 meconium noted leaking   0800 6/50/-4 with BB, not ruptured, scant old blood on towel between legs    ASSESSMENT:  ==============  Anna Qiu  28 year old  female  Estimated Date of Delivery: Aug 3, 2024  IUP @ 39w1d  for induction of labor.  Indication: mild poly     Fetal Heart Rate Tracing category one over the last 30 minutes  GBS- negative  Patient Active Problem List   Diagnosis    High risk social situation    Family history of sudden cardiac death (SCD)    Chest pain, atypical    Atopic dermatitis    Anxious depression    History of syphilis    Susceptible to varicella (non-immune), currently pregnant    HRP (high risk pregnancy), third trimester    Nonimmune to hepatitis B virus     Vitamin D deficiency    Polyhydramnios in third trimester    Labor and delivery, indication for care          PLAN:  ===========    -Anticipate progress and NSVB.   -Will discuss with MD team at safety rounds, option for needling bag  -Encourage position changes  -Continue Pitocin induction    Clinically Significant Risk Factors                                          Mae MAURER, CNM

## 2024-07-28 NOTE — PROGRESS NOTES
SUBJECTIVE:  ==============  Anna Qiu  Estimated Date of Delivery: Aug 3, 2024  General appearance: comfortable. Denies HA, visual changes or upper abdominal pain.   Support: family at home per pt request      OBJECTIVE:  ==============  VITALS  Blood pressure 124/66, temperature 98.4  F (36.9  C), temperature source Oral, resp. rate 16, last menstrual period 10/28/2023, SpO2 100%, not currently breastfeeding.  Patient Vitals for the past 24 hrs:   BP Temp Temp src Resp SpO2   24 0900 -- -- -- -- 100 %   24 0830 124/66 97.4  F (36.3  C) Oral 16 100 %   24 0730 133/79 98.9  F (37.2  C) Oral 16 98 %   24 0700 -- -- -- -- 97 %   24 0630 125/69 97.9  F (36.6  C) Oral -- --   24 0530 131/62 97.8  F (36.6  C) Oral -- --   24 0430 127/59 97.8  F (36.6  C) Oral -- --   24 0330 119/56 97.8  F (36.6  C) Oral -- --   24 0230 126/68 98.1  F (36.7  C) Oral -- 97 %   24 0130 126/65 98  F (36.7  C) Oral -- 98 %   24 0030 121/67 97.9  F (36.6  C) Oral -- 96 %   240 137/72 -- -- -- --   24 (!) 145/76 -- -- -- --   24 128/70 -- -- -- --   24 127/70 -- -- -- --   24 126/70 -- -- -- --   24 (!) 141/75 -- -- -- --   24 126/69 -- -- -- --   24 130/70 -- -- -- --   24 127/73 -- -- -- --   24 122/68 -- -- -- --   24 122/70 97.9  F (36.6  C) Oral -- --   24 128/74 -- -- -- --   24 127/66 -- -- -- --   24 129/74 -- -- -- --   24 131/76 -- -- -- --   24 137/74 -- -- -- --   24 132/71 -- -- -- --   24 134/69 -- -- -- --   24 131/70 -- -- -- --   24 132/70 -- -- -- --   24 133/71 -- -- 18 100 %   24 133/69 -- -- 18 100 %   24 (!) 140/79 -- -- 18 100 %   24 (!) 140/79 -- -- 18 --   24 (!)  147/81 -- -- 18 --   24 1854 (!) 144/89 -- -- 18 100 %   24 1852 139/88 -- -- 18 100 %   24 1850 137/89 -- -- 18 100 %   24 1600 137/83 97.4  F (36.3  C) Oral 16 --   24 1320 133/76 97.6  F (36.4  C) Oral 18 --       FETAL HEART RATE ASSESSMENT:  Baseline rate 130s, normal  Variability moderate  Accelerations present   Decelerations not present      CONTRACTIONS: Contractions every 1-3 minutes.  Palpate: moderate  Pitocin- 6 mu/min.,  Antibiotics- none    ROM: clear fluid AROM with FSE by ROMAN Comer CNM with Dr. Hernandez at bedside performing fundal support and BSUS concurrently  PELVIC EXAM:PELVIC EXAM: 6/ 50%/ Mid/ soft/ -4     # Pain Assessment:      2024     9:00 AM   Current Pain Score   Patient currently in pain? denies   None      Assessment: EFM interpretation suggests absence of concern for fetal metabolic acidemia at this time due to accelerations present, decelerations absent, heart rate: normal baseline, and variability: moderate    Labor course:  Misoprostol #1 at 0940  Huber balloon placed at 1340  Huber out at 2014  SROM [id number] meconium noted leaking   0800 6/50/-4 with BB, not ruptured, scant old blood on towel between legs  0900 6/50/-4 AROM with FSE- clear, GHTN and PreE labs ordered    ASSESSMENT:  ==============  Anna Qiu  28 year old  female  Estimated Date of Delivery: Aug 3, 2024  IUP @ 39w1d  for induction of labor.  Indication: mild poly     Fetal Heart Rate Tracing category one over the last 30 minutes  GBS- negative    Patient Active Problem List   Diagnosis    High risk social situation    Family history of sudden cardiac death (SCD)    Chest pain, atypical    Atopic dermatitis    Anxious depression    History of syphilis    Susceptible to varicella (non-immune), currently pregnant    HRP (high risk pregnancy), third trimester    Nonimmune to hepatitis B virus    Vitamin D deficiency    Polyhydramnios in third trimester    Labor and  delivery, indication for care    Gestational hypertension, third trimester          PLAN:  ===========    -Anticipate progress and NSVB.   -Reevaluate progress in 2-4 hours or sooner with a change in status.  -Encourage position changes  -GHTN, elevated BP nearly 4hrs apart, however per RN some of the elevations were during epidural placement and those readings were not accurate and/or were situational  -PreE labs ordered      Mae MAURER, JULIANM

## 2024-07-28 NOTE — PROVIDER NOTIFICATION
07/28/24 1421   Provider Notification   Provider Name/Title Elias CNM   Method of Notification At Bedside   Request Evaluate in Person   Notification Reason Other (Comment)     Patient requesting to speak with providers about a c/s.

## 2024-07-28 NOTE — PLAN OF CARE
Patient here for IOL for mild poly. VSS; EFM as charted. Patient getting comfortable with epidural. Anticipate .

## 2024-07-28 NOTE — ANESTHESIA PROCEDURE NOTES
Epidural catheter Procedure Note    Pre-Procedure   Staff -        Anesthesiologist:  Ahmet Ramos MD       Resident/Fellow: Elizabeth Cabrera MD       Performed By: resident and with residents       Procedure performed by resident/fellow/CRNA in presence of a teaching physician.         Location: OB       Procedure Start/Stop Times: 7/27/2024 6:28 PM and 7/27/2024 6:49 PM       Pre-Anesthestic Checklist: patient identified, IV checked, risks and benefits discussed, informed consent, monitors and equipment checked, pre-op evaluation, at physician/surgeon's request and post-op pain management  Timeout:       Correct Patient: Yes        Correct Procedure: Yes        Correct Site: Yes        Correct Position: Yes   Procedure Documentation  Procedure: epidural catheter       Diagnosis: labor analgesia       Patient Position: sitting       Patient Prep/Sterile Barriers: sterile gloves, mask, patient draped       Skin prep: Chloraprep       Local skin infiltrated with 3 mL of 1% lidocaine.        Insertion Site: L2-3. (midline approach).       Technique: LORT saline        JAMIN at 8.5 cm.       Needle Type: [employer] needle       Needle Gauge: 17.        Needle Length (Inches): 3.5        Catheter: 19 G.          Catheter threaded easily.         5 cm epidural space.         Threaded 14 cm at skin.         # of attempts: 2 and  # of redirects:  2    Assessment/Narrative         Paresthesias: No.       Test dose of 3 mL lidocaine 1.5% w/ 1:200,000 epinephrine at 18:46 CDT.         Test dose negative, 3 minutes after injection, for signs of intravascular, subdural, or intrathecal injection.       Insertion/Infusion Method: LORT saline       Aspiration negative for Heme or CSF via Epidural Catheter.    Medication(s) Administered   0.125% bupivacaine (Epidural) - EPIDURAL   10 mL - 7/27/2024 6:52:00 PM  Medication Administration Time: 7/27/2024 6:28 PM      FOR [facility] [place] [place]:   Pain Team Contact information: please  "page the Pain Team Via [facility]. Search \"Pain\". During daytime hours, please page the attending first. At night please page the resident first.      "

## 2024-07-28 NOTE — PROGRESS NOTES
S;General appearance: comfortable  With epidural has been sleeping not noting pressure or urge to push  Support: family left for now       Blood pressure 126/68, temperature 97.4  F (36.3  C), temperature source Oral, resp. rate 18, last menstrual period 10/28/2023, SpO2 97%, not currently breastfeeding.      Baseline rate 130, normal  Variability moderate  Accelerations present   Decelerations present, deceleration type: variable with some late , deceleration frequency: intermittent. Are the decelerations significant?   No   present for <50% of the time; CONTRACTIONS: every 2-4 minutes.  Palpate: strong  Pitocin- 8 mu/min.,  Antibiotics- none  ROM:  meconium fluid noted  PELVIC EXAM:deferred  # Pain Assessment:      2024     4:10 PM   Current Pain Score   Patient currently in pain? yes   Comfortable with epidural       Assessment: EFM interpretation suggests absence of concern for fetal metabolic acidemia at this time due to accelerations present, heart rate: normal baseline, and variability: moderate     The interventions currently taken to improve the fetal heart rate tracing and fetal oxygenation are: maternal positioning    Labor course:  Misoprostol #1 at 0940  Huber balloon placed at 1340  Huber out at 2014  SROM 2355  0577 meconium noted leaking     ASSESSMENT:  ==============  Anna Qiu  28 year old  female  Estimated Date of Delivery: Aug 3, 2024  IUP @ 39w1d IOL mild poly    Fetal Heart rate tracing  intermittent cat 2 resolves with position changes    GBS- negative    Patient Active Problem List   Diagnosis    High risk social situation    Family history of sudden cardiac death (SCD)    Chest pain, atypical    Atopic dermatitis    Anxious depression    History of syphilis    Susceptible to varicella (non-immune), currently pregnant    HRP (high risk pregnancy), third trimester    Nonimmune to hepatitis B virus    Vitamin D deficiency    Polyhydramnios in third trimester    Labor and  delivery, indication for care      PLAN:  ===========  Pain medication epidural  maternal positions to maximize fetal oxygenation and facilitate rotation/descent   Anticipate   Labor augmentation with Pitocin  Reevaluate in next 1-2 h      Per the category II algorithm, the plan is to continue observe/conservative management. Reevaluate in 60 minutes.     LIBERTAD StocktonM

## 2024-07-28 NOTE — PROGRESS NOTES
S;General appearance: comfortable  With epidural  3 family members are here now pt is trying to sleep  is drowsy     Blood pressure 133/71, temperature 97.4  F (36.3  C), temperature source Oral, resp. rate 18, last menstrual period 10/28/2023, SpO2 100%, not currently breastfeeding.  Asked by RN to assess for FSE as when pt is turned on side difficult to trace  now tracing continuously In pt preferred position of SF   SROM noted per RN at 2355 clear fluid   Baseline rate 125, normal  Variability moderate  Accelerations present   Decelerations not present      CONTRACTIONS: Contractions every 2-3 minutes.  Palpate: moderate  Pitocin-  started per RN at 2215 when contractions spaced  now at 4 mu/min.,  Antibiotics- none  ROM: clear fluid  PELVIC EXAM:PELVIC EXAM: / Posterior/ soft/ -3/-4 BBOW palpated  due to high station and  BBOW unable to apply FSE    # Pain Assessment:      2024     4:10 PM   Current Pain Score   Patient currently in pain? yes   Comfortable with epidural       Assessment: EFM interpretation suggests absence of concern for fetal metabolic acidemia at this time due to accelerations present, heart rate: normal baseline, and variability: moderate     The interventions currently taken to improve the fetal heart rate tracing and fetal oxygenation are:     Labor course:  Misoprostol #1 at 0940  Huber balloon placed at 1340  Huber out at 2013 IC pit begun  235 SROM clear leak    ASSESSMENT:  ==============  Anna Qiu  28 year old  female  Estimated Date of Delivery: Aug 3, 2024  IUP @ 39w1d  IOL mild poly  SROM early labor   Fetal Heart rate tracing  category one   GBS- negative    Patient Active Problem List   Diagnosis    High risk social situation    Family history of sudden cardiac death (SCD)    Chest pain, atypical    Atopic dermatitis    Anxious depression    History of syphilis    Susceptible to varicella (non-immune), currently pregnant    HRP (high risk  pregnancy), third trimester    Nonimmune to hepatitis B virus    Vitamin D deficiency    Polyhydramnios in third trimester    Labor and delivery, indication for care          PLAN:  ===========  comfort measures prn   Pain medication epidural   Anticipate   MD consultant on call / available prn  Labor augmentation with Pitocin  reevaluate in 2-4 hours/PRN        LIBERTAD StocktonM

## 2024-07-28 NOTE — OP NOTE
[facility]  Operative Note     Surgery Date:  2024  Surgeon:  Kanika Hernandez MD  Assistants:  Laura Bravo MD, PGY3    Eli Moses MD, PGY1    Pre-op Diagnosis:    - Intrauterine pregnancy at 39w1d  - Arrest of Dilation  - Polyhydramnios and suspected T2DM       Post-op Diagnosis:    - Same   - Liveborn male infant    Procedure:    - Primary low-transverse  section with single layer uterine closure via Pfannenstiel  incision    Anesthesia:  Epidural  QBL:    1275 mL  IVF:    1100 mL crystalloid  UOP:    50 mL clear urine at the end of the case  Drains:   Huber Catheter   Specimens:   Routine cord blood/segment, gasses   Antibiotics:  2g Ancef, 500mg Azithromycin  Additional medications:   Complications:  None    Indications:   Anna Qiu is a 28 year old  at 39w1d admitted for IOL due to polyhydramnios. Her labor course was complicated by arrest of dilation. The risks, benefits, and alternatives of  section were discussed with the patient, and she agreed to proceed.     Findings:   Clear amniotic fluid  Liveborn male infant in vertex LAURIE presentation. Born at 1617 on 24. No nuchal cord. Apgars were 9 at 5 minutes. Weight 3374 g.  Normal uterus, fallopian tubes, and ovaries.   Venous cord gas: pH 7.31 / pO2 13 / pCO2 49    Procedure Details:   After the brief, the patient was brought to the OR, where adequate  anesthesia was administered. She was placed in the dorsal supine position with a slight leftward tilt. She was prepped and draped in the usual sterile fashion. A surgical time out was performed. A Pfannenstiel skin incision was made with the scalpel, and carried down to the underlying fascia with sharp and blunt dissection. The fascia was incised in the midline, and the incision was extended laterally with the Healy scissors. The superior aspect of the fascia was grasped with the Kocher clamps and dissected off of the underlying  rectus muscles with blunt and sharp dissection. Attention was then turned to the inferior aspect of the fascia, which was similarly dissected off of the underlying rectus muscles. The rectus muscles were  in the midline, and the peritoneum was entered bluntly, and the opening was extended with digital pressure and electrocautery. The bladder blade was placed. A transverse hysterotomy was made with the scalpel in the lower uterine segment, and the incision was extended with digital pressure. The infant was noted to be in the LAURIE position, and was delivered atraumatically. The shoulders delivered easily. No nuchal cord was noted. The cord was doubly clamped and cut after 60 seconds, and the infant was handed off to the awaiting nursery staff. A segment of cord was cut and sent to lab. Cord gasses were collected. The placenta was delivered with gentle traction on the umbilical cord and uterine massage. The placenta was intact. The uterus was exteriorized and cleared of all clots and debris. Uterine tone was noted to be firm with 30 units of pitocin given through the running IV and uterine massage. The hysterotomy was closed with a running locked suture of 0 Vicryl. The hysterotomy was noted to be hemostatic. The posterior cul-de-sac was cleared of all clots and debris. The uterus was returned to the abdomen. The pericolic gutters were cleared of all clots and debris. The hysterotomy was reexamined, and areas of oozing were controlled with electrocautery, after which they were noted to be hemostatic. The peritoneum was closed with a running 3-0 Vicryl suture. The fascia and rectus muscles were examined and noted to be hemostatic. The fascia was closed with a running 0 Vicryl suture. The subcutaneous tissue was irrigated and areas of oozing were controlled with electrocautery. The subcutaneous tissue was greater than 2cm in thickness, and was therefore  closed with interrupted stiches using a 3-0 Plain suture.  The skin was closed with a running subcuticular 4-0 Monocryl suture and covered with a sterile dressing and steri-strips    All sponge, needle, and instrument counts were correct. The patient tolerated the procedure well, and was transferred to recovery in stable condition. Dr. Hernandez was present and scrubbed for the procedure.   Debrief was performed.     Eli Moses MD PGY-1   07/28/24 5:22 PM     I was present and scrubbed for the entire surgery and I have reviewed and edited this note.   Kanika Hernandez MD

## 2024-07-29 VITALS
DIASTOLIC BLOOD PRESSURE: 86 MMHG | RESPIRATION RATE: 18 BRPM | TEMPERATURE: 97.9 F | OXYGEN SATURATION: 98 % | BODY MASS INDEX: 37.57 KG/M2 | WEIGHT: 247 LBS | HEART RATE: 85 BPM | SYSTOLIC BLOOD PRESSURE: 150 MMHG

## 2024-07-29 PROBLEM — O13.9 GESTATIONAL HYPERTENSION: Status: ACTIVE | Noted: 2024-07-29

## 2024-07-29 PROCEDURE — 250N000011 HC RX IP 250 OP 636

## 2024-07-29 PROCEDURE — 250N000013 HC RX MED GY IP 250 OP 250 PS 637: Performed by: OBSTETRICS & GYNECOLOGY

## 2024-07-29 PROCEDURE — 250N000013 HC RX MED GY IP 250 OP 250 PS 637

## 2024-07-29 PROCEDURE — 99232 SBSQ HOSP IP/OBS MODERATE 35: CPT | Mod: GC | Performed by: OBSTETRICS & GYNECOLOGY

## 2024-07-29 RX ORDER — NIFEDIPINE 30 MG/1
30 TABLET, EXTENDED RELEASE ORAL EVERY EVENING
Status: DISCONTINUED | OUTPATIENT
Start: 2024-07-29 | End: 2024-07-29

## 2024-07-29 RX ORDER — AMOXICILLIN 250 MG
1 CAPSULE ORAL DAILY
Qty: 100 TABLET | Refills: 0 | Status: SHIPPED | OUTPATIENT
Start: 2024-07-29 | End: 2024-08-02

## 2024-07-29 RX ORDER — IBUPROFEN 600 MG/1
600 TABLET, FILM COATED ORAL EVERY 6 HOURS PRN
Qty: 60 TABLET | Refills: 0 | Status: SHIPPED | OUTPATIENT
Start: 2024-07-29 | End: 2024-08-02

## 2024-07-29 RX ORDER — FERROUS SULFATE 325(65) MG
325 TABLET ORAL
Qty: 60 TABLET | Refills: 0 | Status: SHIPPED | OUTPATIENT
Start: 2024-07-29 | End: 2024-08-02

## 2024-07-29 RX ORDER — CYCLOBENZAPRINE HCL 10 MG
10 TABLET ORAL 3 TIMES DAILY
Qty: 9 TABLET | Refills: 0 | Status: SHIPPED | OUTPATIENT
Start: 2024-07-29 | End: 2024-08-02

## 2024-07-29 RX ORDER — OXYCODONE HYDROCHLORIDE 5 MG/1
5 TABLET ORAL EVERY 4 HOURS PRN
Qty: 18 TABLET | Refills: 0 | Status: SHIPPED | OUTPATIENT
Start: 2024-07-29 | End: 2024-08-02

## 2024-07-29 RX ORDER — IBUPROFEN 800 MG/1
800 TABLET, FILM COATED ORAL EVERY 6 HOURS
Status: DISCONTINUED | OUTPATIENT
Start: 2024-07-29 | End: 2024-07-30 | Stop reason: HOSPADM

## 2024-07-29 RX ORDER — NIFEDIPINE 30 MG/1
60 TABLET, EXTENDED RELEASE ORAL DAILY
Status: DISCONTINUED | OUTPATIENT
Start: 2024-07-30 | End: 2024-07-30 | Stop reason: HOSPADM

## 2024-07-29 RX ORDER — ACETAMINOPHEN 325 MG/1
650 TABLET ORAL EVERY 6 HOURS PRN
Qty: 100 TABLET | Refills: 0 | Status: SHIPPED | OUTPATIENT
Start: 2024-07-29 | End: 2024-08-02

## 2024-07-29 RX ADMIN — ACETAMINOPHEN 975 MG: 325 TABLET, FILM COATED ORAL at 10:36

## 2024-07-29 RX ADMIN — SENNOSIDES AND DOCUSATE SODIUM 1 TABLET: 50; 8.6 TABLET ORAL at 07:51

## 2024-07-29 RX ADMIN — OXYCODONE HYDROCHLORIDE 5 MG: 5 TABLET ORAL at 19:27

## 2024-07-29 RX ADMIN — OXYCODONE HYDROCHLORIDE 5 MG: 5 TABLET ORAL at 06:33

## 2024-07-29 RX ADMIN — OXYCODONE HYDROCHLORIDE 5 MG: 5 TABLET ORAL at 14:39

## 2024-07-29 RX ADMIN — OXYCODONE HYDROCHLORIDE 5 MG: 5 TABLET ORAL at 02:21

## 2024-07-29 RX ADMIN — ACETAMINOPHEN 975 MG: 325 TABLET, FILM COATED ORAL at 04:14

## 2024-07-29 RX ADMIN — CYCLOBENZAPRINE HYDROCHLORIDE 10 MG: 5 TABLET, FILM COATED ORAL at 14:32

## 2024-07-29 RX ADMIN — ACETAMINOPHEN 975 MG: 325 TABLET, FILM COATED ORAL at 19:27

## 2024-07-29 RX ADMIN — KETOROLAC TROMETHAMINE 30 MG: 30 INJECTION, SOLUTION INTRAMUSCULAR at 05:21

## 2024-07-29 RX ADMIN — CYCLOBENZAPRINE HYDROCHLORIDE 10 MG: 5 TABLET, FILM COATED ORAL at 07:50

## 2024-07-29 RX ADMIN — IBUPROFEN 800 MG: 800 TABLET, FILM COATED ORAL at 14:32

## 2024-07-29 RX ADMIN — OXYCODONE HYDROCHLORIDE 5 MG: 5 TABLET ORAL at 10:36

## 2024-07-29 RX ADMIN — NIFEDIPINE 30 MG: 30 TABLET, FILM COATED, EXTENDED RELEASE ORAL at 01:20

## 2024-07-29 ASSESSMENT — ACTIVITIES OF DAILY LIVING (ADL)
ADLS_ACUITY_SCORE: 18
ADLS_ACUITY_SCORE: 19
ADLS_ACUITY_SCORE: 18

## 2024-07-29 NOTE — PROGRESS NOTES
"CNM Courtesy Round:    Patient Name:  Anna Qiu  :      1995  MRN:      [id number]    Assessment:   29 yo [id number] Day 1 postpartum, s/p  for Arrest of Dilation/ Pt request   Gestational hypertension  Anxiety  Varicella Non-Immune  Does not desire future pregnancy  CNM courtesy round.    Plan:    -Reviewed recommendation that Anna Qiu schedule a postpartum visit at 1 and 6 weeks.   -Reviewed options for pp contraception. BTL still an option after 6 wk pp visit, Mirena IUD.  Recommendation that Anna Qiu utilize a reliable form of birth control to allow her  scar to heal well. Optimal interpregnancy interval is >18 months.   -Lactation support: informed lactation consultant that Anna Qiu is interested in lactation support but does not want hands on guidance.  -New mom handout given and reviewed. Discussed breast care, pain management, breastfeeding initiation, bowel changes, return of fertility, postpartum exercises, postpartum mood changes and adjustments, postpartum blues vs. postpartum depression, sleep changes and required support.   -Anxiety/depression postpartum: reviewed s/sx to watch for, offered support  -Plan for today: encouraged rest, skin-to-skin, breastfeeding on cue, adequate pain control and limiting visitors.     -Discussed that patient is under physician management with CNM support as needed.        Subjective:  Integrating birth experience. Pleased with her care. She was felt done with the process and frustrated her cervix wasn't dilating more and asked for a CS. She feels like the surgery went well, though it was scary.   The patient is voiding and ambulating without difficulty. Tolerating normal diet and passing flatus.  Bleeding is minimal. Pain is  well controlled with current medications. Baby \"Echavarria Marri\" has been put to breast and has also gotten formula.   Anna Qiu shares that she feels like she has been violated with " "the \"poking and prodding\" and manipulation of her breast with breastfeeding. She does not want anyone to touch her breasts.  She is hoping to breastfeed and is interested in assistance with this. Her mother will be able to provide support for her when she goes home. (Her mother had 9 girls).   Postpartum contraception plans include: Anna is wondering if a BTL is still an option. Also interested in Mirena IUD.   Support at home identified Mother will be with her for one week. Anna is close with her family and they are supportive     Patient has a history of anxiety. Being at the hospital and having so many people coming into her room is making her feel more anxious. She denies any history of medication management or therapy for her anxiety.      Objective:  No exam. Courtesy round only.          Lesley Pitts CNM, LIBERTAD, MAHNAZ   7/29/2024 10:18 AM    "

## 2024-07-29 NOTE — PLAN OF CARE
Pt had one elevated blood pressure of 143/91 and the repeat was 137/86. Had +3 reflexes on the left leg and +2 on the right. Denies any headaches, vision changes and epigastric pain. Pt is bonding well with baby, very independent with her cares and that of baby. Pt said she breast fed baby at 0600 this morning, but the feeding entered was formula feeding. Pt declined lab draw, the varicella vaccine and do not want the Lovenox too. Denies pain, but had Flexeril and stool soften this morning. Continue PP cares.

## 2024-07-29 NOTE — PROGRESS NOTES
Brief progress note    Paged to bedside about patient stating that she is leaving and wants to go home, with the request to come see the patient. On presentation to bedside, the patient stated that she is leaving, and that there is nothing the provider team can say for her to stay. The reasons to stay were communicated to the patient, including her uncontrolled hypertension with consistent mild-range BP, and acute blood-loss anemia with an estimated Hgb of 8 after a starting hgb of 11, though unconfirmed as the patient refused a blood-draw this morning. The risks of leaving AMA were conveyed including the risk of stroke, progression to pre-eclampsia or eclampsia, seizures, or death. The patient was notified she needed to sign AMA papers. Plan communicated with Dr. Terrazas who will follow-up.    Blayne Clark MD  Obstetrics and Gynecology, PGY-2  07/29/2024 6:58 PM

## 2024-07-29 NOTE — PLAN OF CARE
Vital signs within normal limits. Postpartum checks within normal limits. Patient eating and drinking normally. Pt is c/o incisional pain with position changes and fundal assessments not well controlled with tylenol, Toradol and and oxy. Provider notified, will add lidocaine patches and flexeril. Breastfeeding on cue, requires assist with deeper latch and positioning. Positive attachment behaviors observed with infant. Support person present and very supportive. Will continue to assess/assist as needed.

## 2024-07-29 NOTE — PROVIDER NOTIFICATION
07/29/24 1213   Provider Notification   Provider Name/Title Tracy Gomez   Method of Notification Electronic Page   Request Evaluate-Remote   Notification Reason Status Update     just an fyi. patient is a little agitated over the amount of providers entering the room today. She mentioned she may want to leave AMA. I told her the importance of having baby's 24 hour stuff done; she agreed to that. I offered to move them up. So this is just a head's up that she may be wanting to leave tonight. She does not want anyone to come visit her now about it though.

## 2024-07-29 NOTE — PROGRESS NOTES
"Received order for SW to see for \"Family /Support and Mental Health Issues\".        SW met Anna this morning. She denies social work needs or concerns but is receptive to support.    Anna identifies strong support. She shares that sometimes all the support can be overwhelming.  Her mother will stay with her after discharge to provide some hands on help.    Anna is not currently working.  She is connected with appropriate community resources including [employer], SNAP, and WIC benefits. She will notify the [facility] of baby's birth and check her eligibility for cash assistance.      Anna denies any significant mental health history. SW provided psycho-education about postpartum mood and anxiety disorders.  She has no concerns about her current mood and coping.    Anna has all essential baby supplies to bring her baby.  SW [facility].     SW encouraged patient to ask for SW if she thinks of additional needs or questions.    ERIS Terrell [facility]  Clinical   Maternal Child Health  Voicemail:  [phone]  Reachable via [employer]        "

## 2024-07-29 NOTE — PLAN OF CARE
delivery of male at 1617 with Dr. Hernandez in attendance. Nathalia Pierre RN, for nursery. Placenta removed without complication. TAP performed in OR. To PACU in stable condition.

## 2024-07-29 NOTE — PLAN OF CARE
Data: Anna Qiu transferred to 7141 via cart at 1850. Baby transferred via parent's arms.  Action: Receiving unit notified of transfer: Yes. Patient and family notified of room change. Report given to Lesvia MARCELINO RN at 1830. Belongings sent to receiving unit. Accompanied by Registered Nurse. Oriented patient to surroundings. Call light within reach. ID bands double-checked with receiving RN.  Response: Patient tolerated transfer and is stable.

## 2024-07-29 NOTE — PLAN OF CARE
Patient arrived to [facility] unit via zoom cart at 1855 ,with belongings, accompanied by family, with infant in arms. Received report from  Julia arias RN  and checked bands. Unit and room orientation started. Call light given; no concerns present at this time. Continue with plan of care.

## 2024-07-29 NOTE — PLAN OF CARE
"Goal Outcome Evaluation:      Plan of Care Reviewed With: patient    Overall Patient Progress: improvingOverall Patient Progress: improving       VSS and postpartum assessments WDL.  Up ad juma with steady gait and independent with cares.  Bonding well with infant.  Breastfeeding and bottle feeding per request on cue.  Pain managed with Toradol, Tylenol, Flexeril, and Oxycodone. Due to void at 11:00 am.  Will continue with postpartum cares and education per plan of care.      Problem: Adult Inpatient Plan of Care  Goal: Plan of Care Review  Description: The Plan of Care Review/Shift note should be completed every shift.  The Outcome Evaluation is a brief statement about your assessment that the patient is improving, declining, or no change.  This information will be displayed automatically on your shift  note.  Outcome: Progressing  Flowsheets (Taken 7/29/2024 0703)  Plan of Care Reviewed With: patient  Overall Patient Progress: improving  Goal: Patient-Specific Goal (Individualized)  Description: You can add care plan individualizations to a care plan. Examples of Individualization might be:  \"Parent requests to be called daily at 9am for status\", \"I have a hard time hearing out of my right ear\", or \"Do not touch me to wake me up as it startles  me\".  Outcome: Progressing  Goal: Absence of Hospital-Acquired Illness or Injury  Outcome: Progressing  Intervention: Prevent Skin Injury  Recent Flowsheet Documentation  Taken 7/28/2024 2344 by Mackenzie Cohen  Body Position: position changed independently  Intervention: Prevent and Manage VTE (Venous Thromboembolism) Risk  Recent Flowsheet Documentation  Taken 7/28/2024 2344 by Mackenzei Cohen  VTE Prevention/Management: SCDs on (sequential compression devices)  Intervention: Prevent Infection  Recent Flowsheet Documentation  Taken 7/28/2024 2344 by Mackenzie Cohen  Infection Prevention:   equipment surfaces disinfected   hand hygiene promoted   rest/sleep promoted   " personal protective equipment utilized  Goal: Optimal Comfort and Wellbeing  Outcome: Progressing  Intervention: Provide Person-Centered Care  Recent Flowsheet Documentation  Taken 2024 by Mackenzie Cohen  Trust Relationship/Rapport:   care explained   choices provided   emotional support provided   empathic listening provided   questions answered   reassurance provided   questions encouraged   thoughts/feelings acknowledged  Goal: Readiness for Transition of Care  Outcome: Progressing     Problem: Postpartum ( Delivery)  Goal: Successful Parent Role Transition  Outcome: Progressing  Intervention: Support Parent Role Transition  Recent Flowsheet Documentation  Taken 2024 by Mackenzie Cohen  Supportive Measures:   active listening utilized   decision-making supported   self-care encouraged   verbalization of feelings encouraged  Parent-Child Attachment Promotion:   caring behavior modeled   cue recognition promoted   face-to-face positioning promoted   interaction encouraged   parent/caregiver presence encouraged   participation in care promoted   positive reinforcement provided   rooming-in promoted   skin-to-skin contact encouraged   strengths emphasized  Goal: Hemostasis  Outcome: Progressing  Goal: Effective Bowel Elimination  Outcome: Progressing  Goal: Fluid and Electrolyte Balance  Outcome: Progressing  Goal: Absence of Infection Signs and Symptoms  Outcome: Progressing  Intervention: Prevent or Manage Infection  Recent Flowsheet Documentation  Taken 2024 by Mackenzie Cohen  Infection Management: aseptic technique maintained  Goal: Anesthesia/Sedation Recovery  Outcome: Progressing  Goal: Optimal Pain Control and Function  Outcome: Progressing  Goal: Nausea and Vomiting Relief  Outcome: Progressing  Goal: Effective Urinary Elimination  Outcome: Progressing  Goal: Effective Oxygenation and Ventilation  Outcome: Progressing  Intervention: Optimize Oxygenation and  Ventilation  Recent Flowsheet Documentation  Taken 7/28/2024 2348 by Mackenzie Cohen  Head of Bed (HOB) Positioning: HOB at 30-45 degrees

## 2024-07-29 NOTE — PROGRESS NOTES
[facility]   Postpartum Note    Name:  Anna Qiu  MRN: [id number]    S: Patient is feeling very tired, has not slept all night. Pain is manageable at present. Her Huber is in place. She has not been out of bed. Denies nausea, vomiting, dizziness. Lochia appropriate.  Breast and bottle feeding.    O:   Patient Vitals for the past 24 hrs:   BP Temp Temp src Pulse Resp SpO2 Weight   07/29/24 0658 -- -- -- -- -- -- 112 kg (247 lb)   07/29/24 0349 139/88 98.4  F (36.9  C) Oral 99 16 98 % --   07/28/24 2344 (!) 149/82 98.5  F (36.9  C) Oral 84 18 100 % --   07/28/24 2220 139/78 98.3  F (36.8  C) Oral 76 18 -- --   07/28/24 2100 (!) 144/74 98.5  F (36.9  C) Oral 85 18 -- --   07/28/24 2047 (!) 148/86 -- -- 81 18 100 % --   07/28/24 2007 (!) 147/88 -- -- 85 19 99 % --   07/28/24 1915 134/88 98.3  F (36.8  C) Oral 76 20 100 % --   07/28/24 1845 (!) 140/86 -- -- 86 18 100 % --   07/28/24 1830 132/85 -- -- 77 16 100 % --   07/28/24 1815 126/74 -- -- 95 16 100 % --   07/28/24 1800 129/80 -- -- 81 18 100 % --   07/28/24 1745 133/86 -- -- 93 20 100 % --   07/28/24 1730 (!) 160/78 -- -- 98 17 100 % --   07/28/24 1725 (!) 148/101 98.9  F (37.2  C) Oral 95 15 100 % --   07/28/24 1430 (!) 140/75 -- -- -- 18 100 % --   07/28/24 1400 -- -- -- -- 18 100 % --   07/28/24 1330 132/75 98.4  F (36.9  C) Oral -- 18 100 % --   07/28/24 1300 -- -- -- -- 18 100 % --   07/28/24 1230 118/61 98.4  F (36.9  C) Oral -- 18 98 % --   07/28/24 1200 -- -- -- -- -- 98 % --   07/28/24 1130 (!) 142/82 97.8  F (36.6  C) Oral -- 18 97 % --   07/28/24 1100 -- -- -- -- -- 98 % --   07/28/24 1030 -- 97.9  F (36.6  C) Oral -- 16 -- --   07/28/24 0930 138/76 98.4  F (36.9  C) Oral -- 16 97 % --   07/28/24 0900 -- -- -- -- -- 100 % --   07/28/24 0830 124/66 97.4  F (36.3  C) Oral -- 16 100 % --     Gen: Resting comfortably. NAD.  CV: Regular rate. Well perfused.  Pulm: Non-labored breathing on room air.  Abd: Soft, appropriately  tender, non-distended. Fundus below umbilicus, firm and non-tender. Incision covered with bandage, c/d/i.    I/O last 3 completed shifts:  In: 3816.67 [P.O.:550; I.V.:3266.67]  Out: 3172 [Urine:1900; Blood:1272]    Hgb:   Hemoglobin   Date Value Ref Range Status   2024 11.7 11.7 - 15.7 g/dL Final   2021 11.9 11.7 - 15.7 g/dL Final       Assessment/Plan:  Anna Qiu 28 year old  on POD #1 s/p PLTCS for arrest of dilation. Pregnancy notable for gHTN, syphilis s/p adequate treatment, hep B non-immune, varicella non-immune.     # Postpartum management  - Rh positive  - Rubella immune  - Pain: Well controlled with Toradol > ibuprofen, Tylenol, and oxycodone PRN  - Hgb 10.6 > EBL 1275 > AM Hgb pending  - GI: Bowel regimen and antiemetics PRN, simethicone PRN  - : Huber to be removed this morning  - PPX: Lovenox, SCDs while in bed  - Feed: Breast and formula feeding  - BC: Not discussed    # gHTN  - BPs normal to mild range  - D#2 nifedipine 30 mg qPM  - HELLP labs wnl ()    # Hep B non-immune  # Varicella non-immune  - Offer vaccines PTD    # Syphilis  - S/p adequate treatment    # Complex social situation  - Offer SW consult    Medically Ready for Discharge: Anticipated in 2-4 Days    Sanam Reeves MD  OB/GYN PGY-3  2024 7:54 AM     Appreciate note by Dr. Reeves. Patient has been seen and examined by me separate from the resident, agree with above note. Feeling well, easier recovery than she was expecting so far. Reviewed BP management.       Jemima Salgado MD  11:00 AM

## 2024-07-29 NOTE — ANESTHESIA POSTPROCEDURE EVALUATION
Patient: Anna Qiu    Procedure: Procedure(s):   section       Anesthesia Type:  Epidural    Note:  Disposition: Inpatient   Postop Pain Control: Uneventful            Sign Out: Well controlled pain   PONV: No   Neuro/Psych: Uneventful            Sign Out: Acceptable/Baseline neuro status   Airway/Respiratory: Uneventful            Sign Out: Acceptable/Baseline resp. status   CV/Hemodynamics: Uneventful            Sign Out: Acceptable CV status; No obvious hypovolemia; No obvious fluid overload   Other NRE: NONE   DID A NON-ROUTINE EVENT OCCUR?      Epidural-to- Updated ASA: 2 Emergent      Last vitals:  Vitals Value Taken Time   /86 24 1845   Temp 37.2  C (98.9  F) 24 1725   Pulse 86 24 1845   Resp 18 24 1845   SpO2 100 % 24 184       Electronically Signed By: Elba Harrell MD  2024  8:04 AM

## 2024-07-29 NOTE — PROVIDER NOTIFICATION
07/29/24 1213   Provider Notification   Provider Name/Title Dr. Jemima Salgado and G2 Jemima Estes   Method of Notification Electronic Page   Request Evaluate-Remote   Notification Reason Status Update     just an fyi. patient is a little agitated over the amount of providers entering the room today. She mentioned she may want to leave AMA. I told her the importance of having baby's 24 hour stuff done; she agreed to that. I offered to move them up. I also discussed the importance of monitoring her in the first 24 hours and then monitoring her BP while adjusting medicine. So this is just a head's up that she may be wanting to leave tonight. She does not want anyone to come visit her now about it though. she is going to nap now, so hopefully that helps.

## 2024-07-30 NOTE — SIGNIFICANT EVENT
Staff MD Note    Paged by patient bedside RN regarding patient's request to discharge.  Patient is POD#1 s/p PLTCS for arrest of dilation.  Her CS had an associated QBL of 1275 mL.  Patient diagnosed with GHTN in labor.  Has continued to have elevated blood pressures in postpartum time period.  Initiated on Nifedipine 30 mg this morning.  Has had continued elevated BP today with plans to increased dose to Nifedipine 60 mg.  Recently patient has refused BP check due to stress per RN report.  Patient refused blood draw this AM for AM hgb secondary to having been interrupted too many times in the hospital.    On my entry to room introduced myself as Staff MD on for this evening and asked if she was open to talking to me about her reasons for desiring discharge.  Patient states she has a family emergency and needs to leave.  She states she has not slept in 3 days and needs to be our of here as she feels like there are too many interruptions disrupting her and baby's ability to rest.  She states regardless of that, she is too stressed with what is going on with her family and she needs to go home at this time.  She did not elaborate on the family emergency.    I discussed with the patient we would not recommend discharge at this time secondary to her blood pressures being elevated and the potential worsening hypertensive disease which could put her at risk for seizure, stroke and death.  We discussed Black mother's are at higher risk for complications secondary to structural racism.  I discussed my concerns with her about not having obtaining any blood testing for her today as well as not having optimized her blood pressures to have the best outcomes and prevent these types of outcomes.  She states she understands this and appreciates the information, but right now she can not focus on herself and needs to get home.  We discussed my desire to prevent a significant event for her that would limit her ability to parent her   baby, and she states she has good support around her and people who will look out for her.  She states she has to leave the hospital now and her mother is on her way to pick her up.  She has communicated to her mother that we have recommended continued hospitalization, but the patient feels she needs to get home to deal with things and have a night away from the hospital.    I discussed with the patient given I do not have enough information to safely discharge her at this time, I would need for her to sign an against medical advice form.  I did ask her if she was open to a clinic visit tomorrow which she agreed to.  We scheduled this for an urgent visit with Dr. Fuentes at 2 PM.  We discussed symptoms and signs for which she should return to the hospital.  We discussed onset of headache, vision changes, SOB, RUQ pain or increased swelling in her lower extremities should be signs to come back to hospital.  We discussed if her BP at home is >160/110 she should also return.  Patient does not have a BP cuff at home and this was supplied to her.  We discussed the [facility]-BP monitoring program and she also was agreeable to this.      Patient has not yet removed her bandage and she declines having this removed but states she will do at home when she takes a shower.  We discussed warning signs to report with any redness of skin, or pus-like drainage from her incision.  She is open to this be examined in clinic tomorrow.    Patient signed AMA form.  Was notified she may return to the hospital at any time and we would be happy to care for her.  Discharge medications were sent to her requested pharmacy which we notified her closed at 9 PM so she should go straight there to get her medications to have them available.  She was given oxycodone, flexeril, tylenol, ibuprofen, Nifedipine 60 mg, Senna and ferrous sulfate.    Charge and Bedside RN notified and aware of patient plans to discharge AMA.    Nory Terrazas MD

## 2024-07-30 NOTE — PLAN OF CARE
"Goal Outcome Evaluation:  Patient leaving AMA, was ok receiving discharge instructions. Reviewed incision care and C/S discharge instructions. Dr. Terrazas putting in follow-up appointment in the clinic for tomorrow as well as sending discharge medications to her home pharmacy, patient is aware. Breast pump given. Blood pressure cuff given and reviewed how to use blood pressure cuff, patient states understanding of teaching. Patient given HOPE-BP information but left before confirming enrollment into program. Patient anxious to leave and states, \"I need to leave.\" Bedside RN Carri Gant updated.   "

## 2024-07-30 NOTE — PLAN OF CARE
Patient left AMA.  Patient was very overwhelmed by the regular cares.  Provider notified about patient's intention to leave AMA and came to meet and discuss plan with her.  Dr. Terrazas came up with a plan for patient to self-manage and monitor medical conditions and blood pressures..  Bandage is still over incision.  Educated on how to remove bandage in the shower at home.

## 2024-07-30 NOTE — DISCHARGE INSTRUCTIONS
Warning Signs after Having a Baby    Keep this paper on your fridge or somewhere else where you can see it.    Call your provider if you have any of these symptoms up to 12 weeks after having your baby.    Thoughts of hurting yourself or your baby  Pain in your chest or trouble breathing  Severe headache not helped by pain medicine  Eyesight concerns (blurry vision, seeing spots or flashes of light, other changes to eyesight)  Fainting, shaking or other signs of a seizure    Call 9-1-1 if you feel that it is an emergency.     The symptoms below can happen to anyone after giving birth. They can be very serious. Call your provider if you have any of these warning signs.    My provider s phone number: _______________________    Losing too much blood (hemorrhage)    Call your provider if you soak through a pad in less than an hour or pass blood clots bigger than a golf ball. These may be signs that you are bleeding too much.    Blood clots in the legs or lungs    After you give birth, your body naturally clots its blood to help prevent blood loss. Sometimes this increased clotting can happen in other areas of the body, like the legs or lungs. This can block your blood flow and be very dangerous.     Call your provider if you:  Have a red, swollen spot on the back of your leg that is warm or painful when you touch it.   Are coughing up blood.     Infection    Call your provider if you have any of these symptoms:  Fever of 100.4 F (38 C) or higher.  Pain or redness around your stitches if you had an incision.   Any yellow, white, or green fluid coming from places where you had stitches or surgery.    Mood Problems (postpartum depression)    Many people feel sad or have mood changes after having a baby. But for some people, these mood swings are worse.     Call your provider right away if you feel so anxious or nervous that you can't care for yourself or your baby.    Preeclampsia (high blood pressure)    Even if you  didn't have high blood pressure when you were pregnant, you are at risk for the high blood pressure disease called preeclampsia. This risk can last up to 12 weeks after giving birth.     Call your provider if you have:   Pain on your right side under your rib cage  Sudden swelling in the hands and face    Remember: You know your body. If something doesn't feel right, get medical help.     For informational purposes only. Not to replace the advice of your health care provider. Copyright 2020 [facility]. All rights   reserved. Clinically reviewed by Sara Krueger, RNC-OB, MSN. [employer] 437765 - Rev 02/23.

## 2024-07-31 ENCOUNTER — PATIENT OUTREACH (OUTPATIENT)
Dept: CARE COORDINATION | Facility: CLINIC | Age: 29
End: 2024-07-31
Payer: COMMERCIAL

## 2024-08-01 DIAGNOSIS — Z98.891 S/P CESAREAN SECTION: ICD-10-CM

## 2024-08-01 NOTE — TELEPHONE ENCOUNTER
Attempted to call patient, but no voicemail.  Meds pended to provider, as includes narcotics and flexeril

## 2024-08-01 NOTE — TELEPHONE ENCOUNTER
[facility] Call Center    Phone Message    May a detailed message be left on voicemail: yes     Reason for Call: Medication Refill Request    Has the patient contacted the pharmacy for the refill? Yes   Name of medication being requested: ferrous sulfate (FEROSUL) 325 (65 Fe) MG tablet , cyclobenzaprine (FLEXERIL) 10 MG tablet , oxyCODONE (ROXICODONE) 5 MG tablet , senna-docusate (SENOKOT-S/PERICOLACE) 8.6-50 MG tablet , ibuprofen (ADVIL/MOTRIN) 600 MG tablet , acetaminophen (TYLENOL) 325 MG tablet , NIFEdipine ER OSMOTIC (ADALAT CC) 60 MG 24 hr tablet   Provider who prescribed the medication: Dr. Nory Terrazas  Pharmacy: [id number] [place] [id number] AT [facility] [address]   Date medication is needed: ASAP  Pt's dog chewed up the bottles and slobbered all over the medications so she no longer has any of the medications and hasn't taken them today. Pt is hoping she can get a refill asap.    Action Taken: Message routed to:  Other: S    Travel Screening: Not Applicable     Date of Service:

## 2024-08-02 ENCOUNTER — TELEPHONE (OUTPATIENT)
Dept: OBGYN | Facility: CLINIC | Age: 29
End: 2024-08-02
Payer: COMMERCIAL

## 2024-08-02 DIAGNOSIS — Z98.891 S/P CESAREAN SECTION: ICD-10-CM

## 2024-08-02 DIAGNOSIS — Z32.01 PREGNANCY TEST POSITIVE: ICD-10-CM

## 2024-08-02 RX ORDER — PRENATAL VIT/IRON FUM/FOLIC AC 27MG-0.8MG
1 TABLET ORAL DAILY
Qty: 90 TABLET | Refills: 3 | Status: SHIPPED | OUTPATIENT
Start: 2024-08-02

## 2024-08-02 RX ORDER — FERROUS SULFATE 325(65) MG
325 TABLET ORAL
Qty: 60 TABLET | Refills: 0 | OUTPATIENT
Start: 2024-08-02

## 2024-08-02 RX ORDER — IBUPROFEN 600 MG/1
600 TABLET, FILM COATED ORAL EVERY 6 HOURS PRN
Qty: 60 TABLET | Refills: 0 | OUTPATIENT
Start: 2024-08-02

## 2024-08-02 RX ORDER — IBUPROFEN 600 MG/1
600 TABLET, FILM COATED ORAL EVERY 6 HOURS PRN
Qty: 60 TABLET | Refills: 0 | Status: SHIPPED | OUTPATIENT
Start: 2024-08-02

## 2024-08-02 RX ORDER — AMOXICILLIN 250 MG
1 CAPSULE ORAL DAILY
Qty: 100 TABLET | Refills: 0 | Status: SHIPPED | OUTPATIENT
Start: 2024-08-02

## 2024-08-02 RX ORDER — OXYCODONE HYDROCHLORIDE 5 MG/1
5 TABLET ORAL EVERY 4 HOURS PRN
Qty: 18 TABLET | Refills: 0 | OUTPATIENT
Start: 2024-08-02

## 2024-08-02 RX ORDER — ACETAMINOPHEN 325 MG/1
650 TABLET ORAL EVERY 6 HOURS PRN
Qty: 100 TABLET | Refills: 0 | Status: SHIPPED | OUTPATIENT
Start: 2024-08-02

## 2024-08-02 RX ORDER — OXYCODONE HYDROCHLORIDE 5 MG/1
5 TABLET ORAL EVERY 4 HOURS PRN
Qty: 12 TABLET | Refills: 0 | Status: SHIPPED | OUTPATIENT
Start: 2024-08-02

## 2024-08-02 RX ORDER — ACETAMINOPHEN 325 MG/1
650 TABLET ORAL EVERY 6 HOURS PRN
Qty: 100 TABLET | Refills: 0 | OUTPATIENT
Start: 2024-08-02

## 2024-08-02 RX ORDER — FERROUS SULFATE 325(65) MG
325 TABLET ORAL
Qty: 60 TABLET | Refills: 0 | Status: SHIPPED | OUTPATIENT
Start: 2024-08-02

## 2024-08-02 RX ORDER — CYCLOBENZAPRINE HCL 10 MG
10 TABLET ORAL 3 TIMES DAILY
Qty: 9 TABLET | Refills: 0 | OUTPATIENT
Start: 2024-08-02

## 2024-08-02 RX ORDER — CYCLOBENZAPRINE HCL 10 MG
10 TABLET ORAL 3 TIMES DAILY
Qty: 9 TABLET | Refills: 0 | Status: SHIPPED | OUTPATIENT
Start: 2024-08-02

## 2024-08-02 NOTE — TELEPHONE ENCOUNTER
Patient call to MD on-call.  Patient's dog got into medications and crushed the bottles, getting dog slobber over all her pills and had to throw them away.  This has been super stressful and caused her to be in terrible pain.  She requests refill of all her discharge meds to her local pharmacy.  Patient left the hospital early and was discharged on nifedipine 60 mg daily.  She has a BP cuff and has been checking her blood pressure.  She had been in the HOPE-BP program and she hasn't uploaded her reading because of the ongoing stressors and this just seems like one more thing.  She states that today her BP has been in the range of 137-139/86.  She denies symptoms of preeclampsia and she has follow-up scheduled next week.  Both in our clinic on Tuesday as well as with [facility] where she has care givers she is connected with.  Discharge meds refilled to her [facility].  Reviewed s/sx of preE and to come to the ER with concerns if present before her follow-up.    Jennie Bravo MD

## 2025-03-02 ENCOUNTER — HEALTH MAINTENANCE LETTER (OUTPATIENT)
Age: 30
End: 2025-03-02

## (undated) DEVICE — CATH TRAY FOLEY 16FR BARDEX W/DRAIN BAG STATLOCK 300316A

## (undated) DEVICE — ESU PENCIL W/SMOKE EVAC NEPTUNE STRYKER 0703-046-000

## (undated) DEVICE — STOCKING SLEEVE COMPRESSION CALF LG

## (undated) DEVICE — SU VICRYL 0 CT-1 36" J346H

## (undated) DEVICE — STRAP KNEE/BODY 31143004

## (undated) DEVICE — ESU GROUND PAD UNIVERSAL W/O CORD

## (undated) DEVICE — SOL NACL 0.9% IRRIG 1000ML BOTTLE 07138-09

## (undated) DEVICE — PREP CHLORAPREP 26ML TINTED ORANGE  260815

## (undated) DEVICE — GLOVE ESTEEM POWDER FREE SMT 7.5  2D72PT75

## (undated) DEVICE — GLOVE PROTEXIS BLUE W/NEU-THERA 7.5  2D73EB75

## (undated) DEVICE — SOL WATER IRRIG 1000ML BOTTLE 07139-09

## (undated) DEVICE — SU MONOCRYL 0 CTB-1 36" YB946

## (undated) DEVICE — SU VICRYL 4-0 KS 27" UND J662H

## (undated) DEVICE — PACK C-SECTION LF PL15OTA83B

## (undated) RX ORDER — MORPHINE SULFATE 1 MG/ML
INJECTION, SOLUTION EPIDURAL; INTRATHECAL; INTRAVENOUS
Status: DISPENSED
Start: 2024-07-28

## (undated) RX ORDER — FENTANYL CITRATE 50 UG/ML
INJECTION, SOLUTION INTRAMUSCULAR; INTRAVENOUS
Status: DISPENSED
Start: 2024-07-28